# Patient Record
Sex: FEMALE | Race: WHITE | NOT HISPANIC OR LATINO | Employment: FULL TIME | URBAN - METROPOLITAN AREA
[De-identification: names, ages, dates, MRNs, and addresses within clinical notes are randomized per-mention and may not be internally consistent; named-entity substitution may affect disease eponyms.]

---

## 2017-02-13 ENCOUNTER — TELEPHONE (OUTPATIENT)
Dept: OBSTETRICS AND GYNECOLOGY | Facility: CLINIC | Age: 37
End: 2017-02-13

## 2017-02-13 NOTE — TELEPHONE ENCOUNTER
"Requesting appt for regular exam and had an episode of LLQ pains yesterday but none today. Also has had several episodes of vaginal/vulva irritation, thinks it is related to changing soaps. Wants to be checked "ASAP", offered first available appt and pt could not take due to "other plans that day", appt scheduled  "

## 2017-02-13 NOTE — TELEPHONE ENCOUNTER
----- Message from Perez Licea sent at 2/13/2017 11:46 AM CST -----  Contact: Patient  x_ 1st Request  _ 2nd Request  _ 3rd Request    Who: MICAELA FONG [[id number]]    Why: Patient is calling in regards to scheduling a appointment as soon as possible. Patient states she is experiencing some vaginal issues and do not want to discuss it over the phone. Patient is needing a call back from staff.     What Number to Call Back: Patient can reached after 3pm at [phone].    When to Expect a call back: (Before the end of the day)  -- if call after 3:00 call back will be tomorrow.

## 2017-02-13 NOTE — TELEPHONE ENCOUNTER
----- Message from Irene Villalta sent at 2/13/2017  3:14 PM CST -----  Contact: Self  Pt is returning a phone call in order to schedule an appt for some symptoms that she is experiencing. The pt can be reached at [phone]. Thanks KG

## 2017-03-09 ENCOUNTER — OFFICE VISIT (OUTPATIENT)
Dept: OBSTETRICS AND GYNECOLOGY | Facility: CLINIC | Age: 37
End: 2017-03-09
Attending: OBSTETRICS & GYNECOLOGY
Payer: COMMERCIAL

## 2017-03-09 VITALS
BODY MASS INDEX: 20.9 KG/M2 | DIASTOLIC BLOOD PRESSURE: 70 MMHG | SYSTOLIC BLOOD PRESSURE: 114 MMHG | WEIGHT: 110.69 LBS | HEIGHT: 61 IN

## 2017-03-09 DIAGNOSIS — R30.0 DYSURIA: ICD-10-CM

## 2017-03-09 DIAGNOSIS — N94.10 DYSPAREUNIA IN FEMALE: ICD-10-CM

## 2017-03-09 DIAGNOSIS — N90.89 IRRITATION OF VULVA: ICD-10-CM

## 2017-03-09 DIAGNOSIS — R10.2 FEMALE PELVIC PAIN: Primary | ICD-10-CM

## 2017-03-09 DIAGNOSIS — Z87.42 HISTORY OF ABNORMAL CERVICAL PAP SMEAR: ICD-10-CM

## 2017-03-09 PROCEDURE — 87086 URINE CULTURE/COLONY COUNT: CPT

## 2017-03-09 PROCEDURE — 87102 FUNGUS ISOLATION CULTURE: CPT

## 2017-03-09 PROCEDURE — 99213 OFFICE O/P EST LOW 20 MIN: CPT | Mod: S$GLB,,, | Performed by: OBSTETRICS & GYNECOLOGY

## 2017-03-09 PROCEDURE — 99999 PR PBB SHADOW E&M-EST. PATIENT-LVL III: CPT | Mod: PBBFAC,,, | Performed by: OBSTETRICS & GYNECOLOGY

## 2017-03-09 PROCEDURE — 1160F RVW MEDS BY RX/DR IN RCRD: CPT | Mod: S$GLB,,, | Performed by: OBSTETRICS & GYNECOLOGY

## 2017-03-09 PROCEDURE — 87591 N.GONORRHOEAE DNA AMP PROB: CPT

## 2017-03-09 NOTE — PROGRESS NOTES
Subjective:     Patient ID: Juan Ramirez is a 36 y.o. female.     Chief Complaint: Gynecologic Exam (complains of lower abdominal pains on and off x3 weeks, complains of slight vaginal irritation)     History of Present Illness: This patient is a 36 y.o. female, who presents to the GYN clinic for evaluation of pelvic discomfort/ pain,  lower abdominal discomfort, dysuria and frequency of urination.  These symptoms develop several weeks ago.  But has increased in the last few days.  And she is also noted dyspareunia in the last few days.  She is also developed some vulvovaginal irritation and itching.  She denies vaginal discharge,fever, nausea, vomiting and diarrhea. she is not using hormonal contraception.  Her periods are regular, her menstrual flow lasts for 5-6 days and is moderate in amount, with no excessive cramping.       Patient's last menstrual period was 02/26/2017.    Review of Systems    GENERAL: No fever, chills, fatigability or weightchange  SKIN: No rashes, itching or changes in color or texture of skin.  HEAD: No headaches or recent head trauma.  EYES: Visual acuity fine. No photophobia,r diplopia.  EARS: Denies earache or vertigo  NOSE: No loss of smell, no epistaxis or postnasal drip.  MOUTH & THROAT: No hoarseness or change in voice.   NODES: Denies swollen glands.  CHEST: Denies RICARDO, cyanosis, wheezing, cough and sputum production.  CARDIOVASCULAR: Denies chest pain, PND, orthopnea or reduced exercise tolerance.  ABDOMEN: Appetite fine. No weight loss. bloating, Denies diarrhea, abdominal pain, hematemesis or blood in stool.  URINARY: No flank pain, dysuria or hematuria.  PERIPHERAL VASCULAR: No claudication or cyanosis.Varicosities  MUSCULOSKELETAL: No joint stiffness or swelling. Denies back pain.muscle aches  NEUROLOGIC: No history of seizures, paralysis, alteration of gait or coordination.       Objective:       Physical Exam     APPEARANCE: Well nourished, well developed, in no acute  distress.    GENITOURINARY:  Vulva: No lesions. Normal female genital architecture.  Urethral Meatus: Normal size and location, no lesions, no prolapse.  Urethra: No masses, tenderness, prolapse or scarring.  Vagina: Moist with rugae, no discharge, no significant cystocele or rectocele.  Cervix: No lesions, normal diameter, no stenosis, no cervical motion tenderness. .  Uterus: 6 week size, regular shape, mobile, mild tenderness, retroverted position, good support.  Adnexa: No masses, tenderness or CDS nodularity.  There was a non tender, fullness on the right side.  Anus Perineum: No lesions, no relaxation, no external hemorrhoids.  Abdomen: No masses, tenderness, non acute, no hernia or ascites, no hepatosplenomegaly  Neck: Supple. Symmetric without masses. No thyromegaly.  Skin: No rashes, lesions, ulcers, acne, hirsutism.  Peripheral/lower extremities: No edema, erythema or tenderness.  Lymphatic: No axillary, neck or groin nodes palp.  Mental Status: Alert, oriented x 3, normal affect and mood.          @PROCEDURE:@           Assessment:      1. Female pelvic pain    2. Dyspareunia in female    3. Irritation of vulva    4. Dysuria    5. History of abnormal cervical Pap smear               Plan:  1.  DNA cervical screen, Candida culture of the vagina, CBC.  2.  Pelvic ultrasound  3.  Urine culture  4.  FH of breast cancer mother (age 45) and grandmother, refer to breast center for evaluation.                  Orders Placed This Encounter   Procedures    Urine culture    C. trachomatis/N. gonorrhoeae by AMP DNA Cervix    Fungus culture    US Pelvis Complete Non OB

## 2017-03-10 LAB
C TRACH DNA SPEC QL NAA+PROBE: NEGATIVE
N GONORRHOEA DNA SPEC QL NAA+PROBE: NEGATIVE

## 2017-03-11 LAB — BACTERIA UR CULT: NO GROWTH

## 2017-03-14 ENCOUNTER — HOSPITAL ENCOUNTER (OUTPATIENT)
Dept: RADIOLOGY | Facility: HOSPITAL | Age: 37
Discharge: HOME | End: 2017-03-14
Attending: OBSTETRICS & GYNECOLOGY
Payer: COMMERCIAL

## 2017-03-14 DIAGNOSIS — N90.89 IRRITATION OF VULVA: ICD-10-CM

## 2017-03-14 DIAGNOSIS — Z87.42 HISTORY OF ABNORMAL CERVICAL PAP SMEAR: ICD-10-CM

## 2017-03-14 DIAGNOSIS — R10.2 FEMALE PELVIC PAIN: ICD-10-CM

## 2017-03-14 DIAGNOSIS — R30.0 DYSURIA: ICD-10-CM

## 2017-03-14 PROCEDURE — 76856 US EXAM PELVIC COMPLETE: CPT | Mod: TC,PO

## 2017-03-14 PROCEDURE — 76856 US EXAM PELVIC COMPLETE: CPT | Mod: 26,,, | Performed by: RADIOLOGY

## 2017-03-14 PROCEDURE — 76830 TRANSVAGINAL US NON-OB: CPT | Mod: 26,,, | Performed by: RADIOLOGY

## 2017-03-17 ENCOUNTER — TELEPHONE (OUTPATIENT)
Dept: OBSTETRICS AND GYNECOLOGY | Facility: CLINIC | Age: 37
End: 2017-03-17

## 2017-03-17 NOTE — TELEPHONE ENCOUNTER
----- Message from Irene Villalta sent at 3/17/2017 10:45 AM CDT -----  Contact: Self  Pt is calling in regards of discussing her test results if possible. The pt can be reached at  [phone]. Thanks kG

## 2017-03-20 ENCOUNTER — TELEPHONE (OUTPATIENT)
Dept: OBSTETRICS AND GYNECOLOGY | Facility: CLINIC | Age: 37
End: 2017-03-20

## 2017-03-20 NOTE — TELEPHONE ENCOUNTER
----- Message from Karly Warren sent at 3/20/2017  1:49 PM CDT -----  Contact: MICAELA FONG [[id number]]  _  1st Request  _  2nd Request  _  3rd Request        Who: MICAELA FONG [[id number]]    Why: pt is returning a call    What Number to Call Back: [phone]    When to Expect a call back: (Before the end of the day)   -- if call after 3:00 call back will be tomorrow.

## 2017-03-20 NOTE — TELEPHONE ENCOUNTER
Answered questions about test results and meaning of adenomyosis. All questions answered. Pt to call back if continues with progressive/prolonged pain or any changes in symptoms

## 2017-03-20 NOTE — TELEPHONE ENCOUNTER
----- Message from Karly Warren sent at 3/20/2017  1:49 PM CDT -----  Contact: MICAELA FONG [[id number]]  _  1st Request  _  2nd Request  _  3rd Request        Who: MICAELA FONG [[id number]]    Why: pt is returning a call    What Number to Call Back: [phone]    When to Expect a call back: (Before the end of the day)   -- if call after 3:00 call back will be tomorrow.

## 2017-04-11 LAB — FUNGUS SPEC CULT: NORMAL

## 2017-04-26 ENCOUNTER — TELEPHONE (OUTPATIENT)
Dept: FAMILY MEDICINE | Facility: CLINIC | Age: 37
End: 2017-04-26

## 2017-04-26 NOTE — TELEPHONE ENCOUNTER
----- Message from Natalie Sagastume sent at 4/25/2017  4:25 PM CDT -----  Contact:  call  //[phone]    Calling to  Speak to the  Nurse about  Ordering  Test // please call

## 2017-04-26 NOTE — TELEPHONE ENCOUNTER
----- Message from Khloe Roy sent at 4/26/2017 10:34 AM CDT -----  Contact: Patient  Patient returning call. Please call back at [phone]. Thanks,

## 2017-05-01 NOTE — TELEPHONE ENCOUNTER
Called pt, she has an appt Wednesday and wanted to know if NP can order labs. I advised she can and she verbally understood.

## 2017-05-03 ENCOUNTER — LAB VISIT (OUTPATIENT)
Dept: LAB | Facility: HOSPITAL | Age: 37
End: 2017-05-03
Attending: NURSE PRACTITIONER
Payer: COMMERCIAL

## 2017-05-03 ENCOUNTER — OFFICE VISIT (OUTPATIENT)
Dept: FAMILY MEDICINE | Facility: CLINIC | Age: 37
End: 2017-05-03
Payer: COMMERCIAL

## 2017-05-03 VITALS
HEART RATE: 60 BPM | DIASTOLIC BLOOD PRESSURE: 64 MMHG | SYSTOLIC BLOOD PRESSURE: 100 MMHG | BODY MASS INDEX: 20.77 KG/M2 | WEIGHT: 110 LBS | HEIGHT: 61 IN

## 2017-05-03 DIAGNOSIS — R21 RASH OF FACE: ICD-10-CM

## 2017-05-03 DIAGNOSIS — R21 BUTTERFLY RASH: ICD-10-CM

## 2017-05-03 DIAGNOSIS — E06.3 HASHIMOTO'S DISEASE: Primary | ICD-10-CM

## 2017-05-03 DIAGNOSIS — E04.1 THYROID NODULE: ICD-10-CM

## 2017-05-03 DIAGNOSIS — R53.83 FATIGUE, UNSPECIFIED TYPE: ICD-10-CM

## 2017-05-03 DIAGNOSIS — E06.3 HASHIMOTO'S DISEASE: ICD-10-CM

## 2017-05-03 LAB
BILIRUB UR QL STRIP: NEGATIVE
CLARITY UR: ABNORMAL
COLOR UR: YELLOW
GLUCOSE UR QL STRIP: NEGATIVE
HGB UR QL STRIP: NEGATIVE
KETONES UR QL STRIP: NEGATIVE
LEUKOCYTE ESTERASE UR QL STRIP: NEGATIVE
NITRITE UR QL STRIP: NEGATIVE
PH UR STRIP: 7 [PH] (ref 5–8)
PROT UR QL STRIP: NEGATIVE
SP GR UR STRIP: 1.01 (ref 1–1.03)
URN SPEC COLLECT METH UR: ABNORMAL

## 2017-05-03 PROCEDURE — 99999 PR PBB SHADOW E&M-EST. PATIENT-LVL III: CPT | Mod: PBBFAC,,, | Performed by: NURSE PRACTITIONER

## 2017-05-03 PROCEDURE — 81003 URINALYSIS AUTO W/O SCOPE: CPT | Mod: PO

## 2017-05-03 PROCEDURE — 1160F RVW MEDS BY RX/DR IN RCRD: CPT | Mod: S$GLB,,, | Performed by: NURSE PRACTITIONER

## 2017-05-03 PROCEDURE — 99213 OFFICE O/P EST LOW 20 MIN: CPT | Mod: S$GLB,,, | Performed by: NURSE PRACTITIONER

## 2017-05-03 NOTE — MR AVS SNAPSHOT
[facility]  [address] [facility] [address]  [place] [address]  Phone: [phone]  Fax: [phone]                  Juan Ramirez   5/3/2017 1:20 PM   Office Visit    Description:  Female : 1980   Provider:  Violeta James NP   Department:  [facility]           Reason for Visit     Insomnia     Fatigue     Rash           Diagnoses this Visit        Comments    Hashimoto's disease    -  Primary     Thyroid nodule         Fatigue, unspecified type         Rash of face         Butterfly rash                To Do List           Future Appointments        Provider Department Dept Phone    5/3/2017 1:55 PM URINE [facility] [phone]    5/3/2017 2:10 PM URINE [facility] [phone]    2017 2:00 PM Radha Salazar MD [place] - Dermatology [phone]    2017 2:00 PM Niko Mcgraw MD [facility] Rheumatology [phone]      Goals (5 Years of Data)     None      [facility]     [facility] Nurse Care Line -  Assistance  Unless otherwise directed by your provider, please contact [facility], our nurse care line that is available for  assistance.     Registered nurses in the [facility] provide: appointment scheduling, clinical advisement, health education, and other advisory services.  Call: [phone] (toll free)               Medications           Message regarding Medications     Verify the changes and/or additions to your medication regime listed below are the same as discussed with your clinician today.  If any of these changes or additions are incorrect, please notify your healthcare provider.             Verify that the below list of medications is an accurate representation of the medications you are currently taking.  If none reported, the list may be blank. If incorrect, please contact your healthcare provider. Carry this list with you in case of emergency.           Current  "Medications     Lactobacillus rhamnosus GG (CULTURELLE) 10 billion cell capsule Take 1 capsule by mouth once daily.    SYNTHROID 75 mcg tablet Take 1 tablet (75 mcg total) by mouth once daily. Add 1/2 pill on sundays           Clinical Reference Information           Your Vitals Were     BP Pulse Height Weight Last Period BMI    100/64 60 5' 1" (1.549 m) 49.9 kg (110 lb 0.2 oz) 04/19/2017 20.79 kg/m2      Blood Pressure          Most Recent Value    BP  100/64      Allergies as of 5/3/2017     No Known Allergies      Immunizations Administered on Date of Encounter - 5/3/2017     None      Orders Placed During Today's Visit      Normal Orders This Visit    Ambulatory referral to Rheumatology     Future Labs/Procedures Expected by Expires    DOUGLAS  5/3/2017 7/2/2018    C-reactive protein  5/3/2017 8/1/2017    CBC auto differential  5/3/2017 8/1/2017    Comprehensive metabolic panel  5/3/2017 8/1/2017    Cortisol  5/3/2017 7/2/2018    LM-BARR VIRUS ANTIBODY PANEL  5/3/2017 7/2/2018    Sedimentation rate, manual  5/3/2017 8/1/2017    THYROID PEROXIDASE ANTIBODY  5/3/2017 7/2/2018    TSH  5/3/2017 8/1/2017    Urinalysis  5/3/2017 7/2/2018    Vitamin B12  5/3/2017 8/1/2017    Vitamin D  5/3/2017 7/2/2018      Language Assistance Services     ATTENTION: Language assistance services are available, free of charge. Please call [phone].      ATENCIÓN: Si habla polo, tiene a patel disposición servicios gratuitos de asistencia lingüística. Llame al [phone].     [facility] Ý: N?u b?n nói Ti?ng Vi?t, có các d?ch v? h? tr? ngôn ng? mi?n phí dành cho b?n. G?i s? [phone].         [facility] complies with applicable Federal civil rights laws and does not discriminate on the basis of race, color, national origin, age, disability, or sex.        "

## 2017-05-08 NOTE — PROGRESS NOTES
Subjective:       Patient ID: Juan Ramirez is a 36 y.o. female.    Chief Complaint: Insomnia; Fatigue; and Rash (on face. would like to be tested for lupus)    Fatigue   This is a recurrent problem. The current episode started more than 1 month ago. The problem occurs constantly. Associated symptoms include arthralgias, fatigue and a rash (facial). Pertinent negatives include no abdominal pain, anorexia, change in bowel habit, chest pain, chills, congestion, coughing, diaphoresis, fever, headaches, joint swelling, nausea, neck pain, numbness, sore throat, urinary symptoms, vertigo, visual change, vomiting or weakness.   Rash   This is a new problem. The current episode started more than 1 month ago. The affected locations include the face. It is unknown if there was an exposure to a precipitant. Associated symptoms include fatigue. Pertinent negatives include no anorexia, congestion, cough, fever, sore throat or vomiting.     Review of Systems   Constitutional: Positive for fatigue. Negative for chills, diaphoresis and fever.   HENT: Negative for congestion and sore throat.    Respiratory: Negative for cough.    Cardiovascular: Negative for chest pain.   Gastrointestinal: Negative for abdominal pain, anorexia, change in bowel habit, nausea and vomiting.   Musculoskeletal: Positive for arthralgias. Negative for joint swelling and neck pain.   Skin: Positive for rash (facial).   Neurological: Negative for vertigo, weakness, numbness and headaches.       Objective:      Physical Exam   Constitutional: She is oriented to person, place, and time. She appears well-nourished.   HENT:   Head: Normocephalic and atraumatic.   Right Ear: External ear normal.   Left Ear: External ear normal.   Nose: Nose normal.   Mouth/Throat: Oropharynx is clear and moist. No oropharyngeal exudate.   Eyes: Conjunctivae and EOM are normal. Pupils are equal, round, and reactive to light.   Neck: Normal range of motion. Neck supple.    Cardiovascular: Normal rate, regular rhythm, normal heart sounds and intact distal pulses.    Pulmonary/Chest: Effort normal and breath sounds normal. She has no wheezes. She has no rales.   Abdominal: Soft. Bowel sounds are normal. There is no tenderness.   Lymphadenopathy:     She has no cervical adenopathy.   Neurological: She is alert and oriented to person, place, and time.   Skin: Skin is warm and dry. Rash (facial ) noted.   Vitals reviewed.      Assessment:       1. Hashimoto's disease    2. Thyroid nodule    3. Fatigue, unspecified type    4. Rash of face    5. Butterfly rash        Plan:       Juan was seen today for insomnia, fatigue and rash.    Diagnoses and all orders for this visit:    Hashimoto's disease  -     DOUGLAS; Future  -     Sedimentation rate, manual; Future  -     Comprehensive metabolic panel; Future  -     Urinalysis; Future  -     CBC auto differential; Future  -     TSH; Future  -     Vitamin D; Future  -     Vitamin B12; Future  -     LM-BARR VIRUS ANTIBODY PANEL; Future  -     Cortisol; Future  -     C-reactive protein; Future  -     THYROID PEROXIDASE ANTIBODY; Future  -     Ambulatory referral to Rheumatology    Thyroid nodule  -     DOUGLAS; Future  -     Sedimentation rate, manual; Future  -     Comprehensive metabolic panel; Future  -     Urinalysis; Future  -     CBC auto differential; Future  -     TSH; Future  -     Vitamin D; Future  -     Vitamin B12; Future  -     LM-BARR VIRUS ANTIBODY PANEL; Future  -     Cortisol; Future  -     C-reactive protein; Future  -     THYROID PEROXIDASE ANTIBODY; Future  -     Ambulatory referral to Rheumatology    Fatigue, unspecified type  -     DOUGLAS; Future  -     Sedimentation rate, manual; Future  -     Comprehensive metabolic panel; Future  -     Urinalysis; Future  -     CBC auto differential; Future  -     TSH; Future  -     Vitamin D; Future  -     Vitamin B12; Future  -     LM-BARR VIRUS ANTIBODY PANEL; Future  -      Cortisol; Future  -     C-reactive protein; Future  -     THYROID PEROXIDASE ANTIBODY; Future  -     Ambulatory referral to Rheumatology    Rash of face  -     DOUGLAS; Future  -     Sedimentation rate, manual; Future  -     Comprehensive metabolic panel; Future  -     Urinalysis; Future  -     CBC auto differential; Future  -     TSH; Future  -     Vitamin D; Future  -     Vitamin B12; Future  -     LM-BARR VIRUS ANTIBODY PANEL; Future  -     Cortisol; Future  -     C-reactive protein; Future  -     THYROID PEROXIDASE ANTIBODY; Future  -     Ambulatory referral to Rheumatology    Butterfly rash  -     Ambulatory referral to Rheumatology

## 2017-05-16 ENCOUNTER — TELEPHONE (OUTPATIENT)
Dept: OBSTETRICS AND GYNECOLOGY | Facility: CLINIC | Age: 37
End: 2017-05-16

## 2017-05-16 DIAGNOSIS — Z87.42 PERSONAL HISTORY OF OVARIAN CYST: ICD-10-CM

## 2017-05-16 DIAGNOSIS — R10.2 PELVIC PAIN IN FEMALE: Primary | ICD-10-CM

## 2017-05-16 NOTE — TELEPHONE ENCOUNTER
"Pt concerned about possible "adenomyosis" noted in ultrasound report. States cycles are coming slightly earlier by a few days over the last 2-3 months and this past cycle had signifcant episode of cramps/pelvic pain, "felt like labor pains", only last for a few hours.  has had pelvic pain in past, last appt was for pain and had "cysts" Pt  has been researching adenomyosis and concerned about possible "cancer" and requests MRI to see if she has this. Will notify MD  "

## 2017-05-16 NOTE — TELEPHONE ENCOUNTER
"Pt notified as per Dr Elizabeth she can use the ultrasound results as suggestive of adenomyosis, definitve diagnosis is with pathology report after hysterectomy. Discussed MRI not indicated and would not give definitive diagnosis, may not be covered by insurance and could by expensive out of pocket costs. Discussed treatment for adenomyosis is hysterectomy, pt states she is not interested in hysterectomy at this time. Pt is requesting MRI, states "this is making me crazy not knowing if I have this, I need to know", tried to explain MRI will not be diagnostic. Pt insisting on wanting MRI, will make  aware  "

## 2017-05-16 NOTE — TELEPHONE ENCOUNTER
----- Message from Mandie De sent at 5/16/2017  1:40 PM CDT -----  Contact: self  Patient is requesting a call back to discuss getting testing done, patient is unsure of what the testing is called but stated it was for endometriosis, patient can be reached at [phone].

## 2017-06-02 ENCOUNTER — HOSPITAL ENCOUNTER (OUTPATIENT)
Dept: RADIOLOGY | Facility: HOSPITAL | Age: 37
Discharge: HOME | End: 2017-06-02
Attending: OBSTETRICS & GYNECOLOGY
Payer: COMMERCIAL

## 2017-06-02 DIAGNOSIS — R10.2 PELVIC PAIN IN FEMALE: ICD-10-CM

## 2017-06-02 DIAGNOSIS — Z87.42 PERSONAL HISTORY OF OVARIAN CYST: ICD-10-CM

## 2017-06-02 PROCEDURE — 72195 MRI PELVIS W/O DYE: CPT | Mod: 26,,, | Performed by: RADIOLOGY

## 2017-06-02 PROCEDURE — 72195 MRI PELVIS W/O DYE: CPT | Mod: TC,PO

## 2017-06-09 ENCOUNTER — TELEPHONE (OUTPATIENT)
Dept: OBSTETRICS AND GYNECOLOGY | Facility: CLINIC | Age: 37
End: 2017-06-09

## 2017-06-09 NOTE — TELEPHONE ENCOUNTER
Pt requesting results of MRI, explained  has been out of town, in office today and will review and send message vial MO system.

## 2017-06-09 NOTE — TELEPHONE ENCOUNTER
----- Message from Venkata Diaz sent at 6/9/2017 10:12 AM CDT -----  PLEASE CALL PT NO DETAILS  [phone]

## 2017-06-12 ENCOUNTER — TELEPHONE (OUTPATIENT)
Dept: OBSTETRICS AND GYNECOLOGY | Facility: CLINIC | Age: 37
End: 2017-06-12

## 2017-06-12 NOTE — TELEPHONE ENCOUNTER
----- Message from Karly Warren sent at 6/12/2017 11:32 AM CDT -----  Contact: MICAELA FONG [[id number]]  _x  1st Request  _  2nd Request  _  3rd Request        Who: MICAELA FONG [[id number]]    Why: patient states she would like a call back in regards to her MRI test results.     What Number to Call Back: [phone]    When to Expect a call back: (Before the end of the day)   -- if call after 3:00 call back will be tomorrow.

## 2017-06-13 NOTE — TELEPHONE ENCOUNTER
Dr Elizabeth spoke with pt regarding MRI report and history of pelvic pain. Discussed possible need for dx laparoscopy for evaluation/referral to Dr Vanessa. Pt not sure if she is ready to pursue this option at this time. Will notify us if she decides on this

## 2017-06-13 NOTE — TELEPHONE ENCOUNTER
"Pt concerned that MRI "appears normal" but she is having cramps and pelvic pains most days and periods are coming "sooner" each month. States period used to come on the "27th-29th" of the month and now period comes around the "19th" and this month it did come on the "14th". Pt has concerns about possible ovarian cancer and how she would tell if she has this. Will notify MD of pt's concerns and have MD contact pt.  Pt request return call at [phone]  "

## 2017-06-21 ENCOUNTER — OFFICE VISIT (OUTPATIENT)
Dept: DERMATOLOGY | Facility: CLINIC | Age: 37
End: 2017-06-21
Payer: COMMERCIAL

## 2017-06-21 DIAGNOSIS — L82.1 SK (SEBORRHEIC KERATOSIS): ICD-10-CM

## 2017-06-21 DIAGNOSIS — L71.9 ROSACEA: Primary | ICD-10-CM

## 2017-06-21 PROCEDURE — 99999 PR PBB SHADOW E&M-EST. PATIENT-LVL II: CPT | Mod: PBBFAC,,, | Performed by: DERMATOLOGY

## 2017-06-21 PROCEDURE — 99202 OFFICE O/P NEW SF 15 MIN: CPT | Mod: S$GLB,,, | Performed by: DERMATOLOGY

## 2017-06-21 RX ORDER — DOXYCYCLINE HYCLATE 150 MG/1
150 TABLET, DELAYED RELEASE ORAL DAILY
Qty: 30 TABLET | Refills: 1 | Status: SHIPPED | OUTPATIENT
Start: 2017-06-21 | End: 2017-08-21 | Stop reason: SDDI

## 2017-06-21 RX ORDER — SODIUM SULFACETAMIDE AND SULFUR 80; 40 MG/ML; MG/ML
SOLUTION TOPICAL
Qty: 473 ML | Refills: 3 | Status: SHIPPED | OUTPATIENT
Start: 2017-06-21 | End: 2018-10-19

## 2017-06-21 RX ORDER — METRONIDAZOLE 7.5 MG/G
GEL TOPICAL
Qty: 1 TUBE | Refills: 6 | Status: SHIPPED | OUTPATIENT
Start: 2017-06-21 | End: 2017-08-21 | Stop reason: SDDI

## 2017-06-21 NOTE — LETTER
June 21, 2017      Arun Pascual MD  [address] [facility] [address] [place] [address]           [place] - Dermatology  [address] [facility] [address]  Phone: [phone]  Fax: [phone]          Patient: Juan Ramirez   MR Number: [id number]   YOB: 1980   Date of Visit: 6/21/2017       Dear Dr. Arun Pascual:    Thank you for referring Juan Ramirez to me for evaluation. Attached you will find relevant portions of my assessment and plan of care.    If you have questions, please do not hesitate to call me. I look forward to following Juan Ramirez along with you.    Sincerely,    Radha Salazar MD    Enclosure  CC:  No Recipients    If you would like to receive this communication electronically, please contact [email or web address] or [phone] to request more information on [employer] access.    For providers and/or their staff who would like to refer a patient to [facility], please contact us through our one-stop-shop provider referral line, [facility], at [phone].    If you feel you have received this communication in error or would no longer like to receive these types of communications, please e-mail [email or web address]

## 2017-06-21 NOTE — PROGRESS NOTES
Subjective:       Patient ID:  Juan Ramirez is a 36 y.o. female who presents for   Chief Complaint   Patient presents with    Rash     Pt c/o small red bumps on face x a few months. Started in between brows and then spread to cheekis. Was concerned it was lupus and had labs which are normal per patient. Rash gets scaly around her nose.  Pt uses otc della facial mask. Pt has also been gluten free for over a year and has reintroduced gluten and she feels improved. Uses cerave cleanser and cerave pm lotion .    C/o lesion on let breast. Present whole life. Feels shape and color has changed.          Review of Systems   Gastrointestinal: Positive for indigestion.   Skin: Positive for itching and rash. Negative for daily sunscreen use.        Objective:    Physical Exam   Constitutional: She appears well-developed and well-nourished. No distress.   Neurological: She is alert and oriented to person, place, and time. She is not disoriented.   Psychiatric: She has a normal mood and affect.   Skin:   Areas Examined (abnormalities noted in diagram):   Scalp / Hair Palpated and Inspected  Head / Face Inspection Performed  Neck Inspection Performed  Chest / Axilla Inspection Performed  Abdomen Inspection Performed  Back Inspection Performed  RUE Inspected  LUE Inspection Performed                   Diagram Legend     Erythematous scaling macule/papule c/w actinic keratosis       Vascular papule c/w angioma      Pigmented verrucoid papule/plaque c/w seborrheic keratosis      Yellow umbilicated papule c/w sebaceous hyperplasia      Irregularly shaped tan macule c/w lentigo     1-2 mm smooth white papules consistent with Milia      Movable subcutaneous cyst with punctum c/w epidermal inclusion cyst      Subcutaneous movable cyst c/w pilar cyst      Firm pink to brown papule c/w dermatofibroma      Pedunculated fleshy papule(s) c/w skin tag(s)      Evenly pigmented macule c/w junctional nevus     Mildly variegated pigmented,  slightly irregular-bordered macule c/w mildly atypical nevus      Flesh colored to evenly pigmented papule c/w intradermal nevus       Pink pearly papule/plaque c/w basal cell carcinoma      Erythematous hyperkeratotic cursted plaque c/w SCC      Surgical scar with no sign of skin cancer recurrence      Open and closed comedones      Inflammatory papules and pustules      Verrucoid papule consistent consistent with wart     Erythematous eczematous patches and plaques     Dystrophic onycholytic nail with subungual debris c/w onychomycosis     Umbilicated papule    Erythematous-base heme-crusted tan verrucoid plaque consistent with inflamed seborrheic keratosis     Erythematous Silvery Scaling Plaque c/w Psoriasis     See annotation      Assessment / Plan:        Rosacea  -     metronidazole (METROGEL) 0.75 % gel; AAA face qhs  Dispense: 1 Tube; Refill: 6  -     sulfacetamide sodium-sulfur (SULFACLEANSE 8-4) 8-4 % Susp; Wash face qhs  Dispense: 473 mL; Refill: 3  -     doxycycline (DORYX) 150 MG EC tablet; Take 1 tablet (150 mg total) by mouth once daily. Take with food and not within 1 hour of bedtime  Dispense: 30 tablet; Refill: 1  Discussed benefits and risks of doxycyline therapy including but not limited to GI discomfort, esophageal irritation/ulceration, and increased sun sensitivity. Patient was counseled to take medicine with meals and at least 1 hour before lying down.   cerave am and pm and cleanser    SK (seborrheic keratosis)  These are benign inherited growths without a malignant potential. Reassurance given to patient. No treatment is necessary.              Return in about 2 months (around 8/21/2017).

## 2017-08-15 ENCOUNTER — TELEPHONE (OUTPATIENT)
Dept: DERMATOLOGY | Facility: CLINIC | Age: 37
End: 2017-08-15

## 2017-08-15 NOTE — TELEPHONE ENCOUNTER
----- Message from Precious Ramirez sent at 8/15/2017 10:34 AM CDT -----  Contact: pt   IVELISSE-pt- pt is calling to speak with the nurse to reschedule her 8 week follow up appt for tomorrow to another day in September can you please call pt at [phone]    BRO

## 2017-08-21 ENCOUNTER — OFFICE VISIT (OUTPATIENT)
Dept: DERMATOLOGY | Facility: CLINIC | Age: 37
End: 2017-08-21
Payer: COMMERCIAL

## 2017-08-21 DIAGNOSIS — L71.9 ROSACEA: Primary | ICD-10-CM

## 2017-08-21 DIAGNOSIS — L70.0 ACNE VULGARIS: ICD-10-CM

## 2017-08-21 PROCEDURE — 3008F BODY MASS INDEX DOCD: CPT | Mod: S$GLB,,, | Performed by: DERMATOLOGY

## 2017-08-21 PROCEDURE — 99999 PR PBB SHADOW E&M-EST. PATIENT-LVL II: CPT | Mod: PBBFAC,,, | Performed by: DERMATOLOGY

## 2017-08-21 PROCEDURE — 99213 OFFICE O/P EST LOW 20 MIN: CPT | Mod: S$GLB,,, | Performed by: DERMATOLOGY

## 2017-08-21 NOTE — PROGRESS NOTES
Subjective:       Patient ID:  Juan Ramirez is a 36 y.o. female who presents for   Chief Complaint   Patient presents with    Rosacea     Pt presents for rosacea follow up.  Better, almost clear.  tx with sulfacleanse only.  Decided not to take doxy due to it being an antibiotic.  Not using metrogel either because she thought that was an antibiotic also. Still has background redness.  Finds several foods flare her face        Review of Systems   Skin: Positive for rash. Negative for itching and daily sunscreen use.   Hematologic/Lymphatic: Does not bruise/bleed easily.        Objective:    Physical Exam   Constitutional: She appears well-developed and well-nourished. No distress.   Neurological: She is alert and oriented to person, place, and time. She is not disoriented.   Psychiatric: She has a normal mood and affect.   Skin:   Areas Examined (abnormalities noted in diagram):   Head / Face Inspection Performed              Diagram Legend     Erythematous scaling macule/papule c/w actinic keratosis       Vascular papule c/w angioma      Pigmented verrucoid papule/plaque c/w seborrheic keratosis      Yellow umbilicated papule c/w sebaceous hyperplasia      Irregularly shaped tan macule c/w lentigo     1-2 mm smooth white papules consistent with Milia      Movable subcutaneous cyst with punctum c/w epidermal inclusion cyst      Subcutaneous movable cyst c/w pilar cyst      Firm pink to brown papule c/w dermatofibroma      Pedunculated fleshy papule(s) c/w skin tag(s)      Evenly pigmented macule c/w junctional nevus     Mildly variegated pigmented, slightly irregular-bordered macule c/w mildly atypical nevus      Flesh colored to evenly pigmented papule c/w intradermal nevus       Pink pearly papule/plaque c/w basal cell carcinoma      Erythematous hyperkeratotic cursted plaque c/w SCC      Surgical scar with no sign of skin cancer recurrence      Open and closed comedones      Inflammatory papules and pustules       Verrucoid papule consistent consistent with wart     Erythematous eczematous patches and plaques     Dystrophic onycholytic nail with subungual debris c/w onychomycosis     Umbilicated papule    Erythematous-base heme-crusted tan verrucoid plaque consistent with inflamed seborrheic keratosis     Erythematous Silvery Scaling Plaque c/w Psoriasis     See annotation      Assessment / Plan:        Rosacea/ Acne vulgaris  Pt does not want to use systemic tx  Continue sulfacleanse  Start metrogel qam   cerave am and pm   Sunscreen qam   -     azelaic acid (FINACEA) 15 % Foam; Apply thin film to face qhs  Dispense: 50 g; Refill: 6             Return if symptoms worsen or fail to improve.

## 2017-09-07 ENCOUNTER — TELEPHONE (OUTPATIENT)
Dept: FAMILY MEDICINE | Facility: CLINIC | Age: 37
End: 2017-09-07

## 2017-09-07 NOTE — TELEPHONE ENCOUNTER
Called pt, she was seen at [facility] er f./u. Scheduled tomorrow with PA. She verbally understood. Records requested.

## 2017-09-07 NOTE — TELEPHONE ENCOUNTER
----- Message from Kassi Dias sent at 9/7/2017 12:17 PM CDT -----  Contact: Patient  States that she had just left the ER at [place] and needs to be seen right away.  She went in for dizzyness, nauseated and had a fainting spell.  Can you please call the patient back with an ER Follow up appointment,  The number to call her back on is [phone].  Thank you

## 2017-09-08 ENCOUNTER — OFFICE VISIT (OUTPATIENT)
Dept: FAMILY MEDICINE | Facility: CLINIC | Age: 37
End: 2017-09-08
Payer: COMMERCIAL

## 2017-09-08 VITALS
DIASTOLIC BLOOD PRESSURE: 70 MMHG | RESPIRATION RATE: 16 BRPM | BODY MASS INDEX: 21.94 KG/M2 | WEIGHT: 116.19 LBS | HEIGHT: 61 IN | HEART RATE: 73 BPM | OXYGEN SATURATION: 98 % | SYSTOLIC BLOOD PRESSURE: 106 MMHG

## 2017-09-08 DIAGNOSIS — R55 PRE-SYNCOPE: ICD-10-CM

## 2017-09-08 DIAGNOSIS — E03.9 HYPOTHYROIDISM, UNSPECIFIED TYPE: Primary | ICD-10-CM

## 2017-09-08 PROCEDURE — 99214 OFFICE O/P EST MOD 30 MIN: CPT | Mod: S$GLB,,, | Performed by: PHYSICIAN ASSISTANT

## 2017-09-08 PROCEDURE — 3008F BODY MASS INDEX DOCD: CPT | Mod: S$GLB,,, | Performed by: PHYSICIAN ASSISTANT

## 2017-09-08 PROCEDURE — 99999 PR PBB SHADOW E&M-EST. PATIENT-LVL III: CPT | Mod: PBBFAC,,, | Performed by: PHYSICIAN ASSISTANT

## 2017-09-08 RX ORDER — LEVOTHYROXINE SODIUM 50 UG/1
50 TABLET ORAL DAILY
Qty: 30 TABLET | Refills: 11 | Status: SHIPPED | OUTPATIENT
Start: 2017-09-08 | End: 2017-10-13

## 2017-09-08 NOTE — PROGRESS NOTES
Subjective:       Patient ID: Juan Ramirez is a 36 y.o. female    Chief Complaint: ER follow up ([facility]; lightheaded, nausea, vomiting) and Headache    HPI  Mrs Ramirez is new to me, PCP is Dr Pascual.  Yesterday morning she had a pre-syncopal episode, she reports sweats, nausea, vomiting and shortness of breath after driving to work. She went to [facility].  Records have been reviewed and are scanned in.  She reports that she went through her usual routine.  She denies any pain, no fever, no cough, no congestion.  She has a history of hypothyroid and takes synthroid.  Her TSH yesterday was 1.8.  She would like to  her synthroid because she feels like her dose is too high.  She feels like her heart is racing occasionally.      Review of Systems   Constitutional: Negative for chills and fever.   HENT: Negative for congestion and sore throat.    Respiratory: Negative for cough and shortness of breath.    Cardiovascular: Negative for chest pain and palpitations.   Gastrointestinal: Negative for abdominal pain, constipation, diarrhea, nausea and vomiting.   Genitourinary: Negative for dysuria, frequency and vaginal discharge.   Musculoskeletal: Negative for back pain.   Skin: Negative for rash.   Neurological: Negative for dizziness.         Objective:   Physical Exam   Constitutional: She is oriented to person, place, and time. She appears well-developed and well-nourished. No distress.   HENT:   Head: Normocephalic and atraumatic.   Eyes: EOM are normal. Pupils are equal, round, and reactive to light.   Neck: Normal range of motion. Neck supple.   Cardiovascular: Normal rate, regular rhythm, normal heart sounds and intact distal pulses.  Exam reveals no gallop and no friction rub.    No murmur heard.  Pulmonary/Chest: Effort normal and breath sounds normal. No respiratory distress. She has no wheezes. She has no rales. She exhibits no tenderness.   Abdominal: Soft. Bowel sounds are normal. She  exhibits no distension and no mass. There is no tenderness. There is no guarding.   Musculoskeletal: Normal range of motion.   Neurological: She is alert and oriented to person, place, and time.   Skin: Skin is warm and dry. No rash noted. She is not diaphoretic.   Psychiatric: She has a normal mood and affect. Her behavior is normal. Judgment and thought content normal.         Assessment:       1. Hypothyroidism, unspecified type  levothyroxine (SYNTHROID) 50 MCG tablet         Plan:       Hypothyroidism, unspecified type  -     DECREASE levothyroxine (SYNTHROID) 50 MCG tablet; Take 1 tablet (50 mcg total) by mouth once daily. Add 1/2 pill on sundays  Dispense: 30 tablet; Refill: 11  - follow up with your endocrinologist in 1 months for recheck     Pre-syncope  - Go to the ER if symptoms occur again

## 2017-09-13 ENCOUNTER — TELEPHONE (OUTPATIENT)
Dept: ENDOCRINOLOGY | Facility: CLINIC | Age: 37
End: 2017-09-13

## 2017-09-13 NOTE — TELEPHONE ENCOUNTER
----- Message from Daniela Sheth sent at 9/13/2017 11:15 AM CDT -----  Contact: Juan tel:   [phone]   Pt.says she needs a f/u w/ you asap.   Was recently at an  ER on last Friday.   Having  Thyroid problems.     Pls call w/ an appt. To come in asap.

## 2017-09-13 NOTE — TELEPHONE ENCOUNTER
Left vm message that I have no openings anytime soon with Dr Bui but do have one tomorrow at 2:00 with her Nurse Practitioner whom she works very closely with. Asked her to call back to confirm she can make this appointment. Provided call back number

## 2017-09-14 ENCOUNTER — OFFICE VISIT (OUTPATIENT)
Dept: ENDOCRINOLOGY | Facility: CLINIC | Age: 37
End: 2017-09-14
Payer: COMMERCIAL

## 2017-09-14 VITALS
SYSTOLIC BLOOD PRESSURE: 98 MMHG | WEIGHT: 115.31 LBS | HEART RATE: 62 BPM | HEIGHT: 61 IN | DIASTOLIC BLOOD PRESSURE: 70 MMHG | BODY MASS INDEX: 21.77 KG/M2 | RESPIRATION RATE: 18 BRPM

## 2017-09-14 DIAGNOSIS — E06.3 HYPOTHYROIDISM DUE TO HASHIMOTO'S THYROIDITIS: Primary | ICD-10-CM

## 2017-09-14 DIAGNOSIS — E03.8 HYPOTHYROIDISM DUE TO HASHIMOTO'S THYROIDITIS: Primary | ICD-10-CM

## 2017-09-14 PROCEDURE — 3008F BODY MASS INDEX DOCD: CPT | Mod: S$GLB,,, | Performed by: INTERNAL MEDICINE

## 2017-09-14 PROCEDURE — 99999 PR PBB SHADOW E&M-EST. PATIENT-LVL III: CPT | Mod: PBBFAC,,, | Performed by: INTERNAL MEDICINE

## 2017-09-14 PROCEDURE — 99214 OFFICE O/P EST MOD 30 MIN: CPT | Mod: S$GLB,,, | Performed by: INTERNAL MEDICINE

## 2017-09-14 NOTE — PROGRESS NOTES
Subjective:      Patient ID: Juan Ramirez is a 36 y.o. female.    Chief Complaint:  No chief complaint on file.      History of Present Illness  Ms. Ramirez returns to clinic today for Hypothyroidism follow up     She is a prior patient of Dr. Bui with last documented office visit in 05/2016  This is the patient's initial visit with me today     She has a known h/o hypothyroidism diagnosed 3/6/2012    The patient states on Thursday 9/7/2017 - she took Synthroid on an empty stomach with water. Once she arrived at work around 7:30 am, she experienced lower extremity weakness, palpitations, nausea, headaches and blurred vision     She followed up with her PCP on Friday 9/8/2017 and was advised to decrease Synthroid to 50 mcg daily. The patient did not bring outside labs to review     Today, she states she is starting to feel better with the decrease in dosage    She also has complaints of difficulty sleeping and lack of concentration    The patient states she is taking LT4 separate from other food and medications     She states she has concerns regarding LT4 and would like to try [facility]     She is quite convinced that her thyroid disease and elevated TPO antibody are impacting her overall health and quality of life. She is requesting repeat labs and antibody testing today     She is having regular periods   LMP: 9/11/2017     Review of Systems   Constitutional: Positive for fatigue. Negative for unexpected weight change.   HENT: Negative for trouble swallowing and voice change.    Eyes: Positive for visual disturbance.   Respiratory: Negative for shortness of breath.    Cardiovascular: Positive for palpitations (occasional ). Negative for chest pain.   Gastrointestinal: Negative for abdominal pain, blood in stool, constipation, nausea and vomiting.   Endocrine: Negative for cold intolerance and heat intolerance.   Genitourinary: Negative for menstrual problem.   Musculoskeletal: Positive for myalgias.  Negative for arthralgias and neck pain.   Skin: Negative for wound.   Neurological: Positive for tremors and headaches.   Hematological: Does not bruise/bleed easily.   Psychiatric/Behavioral: Positive for decreased concentration and sleep disturbance.       Objective:   Physical Exam   Constitutional: She is oriented to person, place, and time. She appears well-developed. No distress.   Neck: No thyromegaly present.   Cardiovascular: Normal rate.    Pulmonary/Chest: Effort normal.   Abdominal: Soft.   Musculoskeletal: She exhibits no edema.   Lymphadenopathy:     She has no cervical adenopathy.   Neurological: She is alert and oriented to person, place, and time.   Skin: Skin is warm.   Psychiatric: She has a normal mood and affect.   Nursing note and vitals reviewed.    Lab Review:   Lab Results   Component Value Date    TSH 1.252 05/03/2017    TSH 2.118 12/07/2016    TSH 2.166 09/28/2016     Results for MICAELA FONG (MRN [id number]) as of 9/14/2017 15:18   Ref. Range 9/28/2016 15:38 12/7/2016 16:06 5/3/2017 14:13   Thyroperoxidase Antibodies Latest Ref Range: <6.0 IU/mL 119.4 (H) 99.7 (H) 97.5 (H)     Results for MICAELA FONG (MRN [id number]) as of 9/14/2017 15:18   Ref. Range 5/3/2017 14:13   Vitamin B-12 Latest Ref Range: 210 - 950 pg/mL 380   Vit D, 25-Hydroxy Latest Ref Range: 30 - 96 ng/mL 53     US 2014  Heterogeneous thyroid consistent with autoimmune thyroiditis.  No nodules identified.    RECOMMENDATIONS:  No need to repeat neck ultrasound unless clinical changes.  Assessment:     1. Hypothyroidism due to Hashimoto's thyroiditis  Thyroid peroxidase antibody    T4, free    TSH    Vitamin D    TSH     Plan:     1. Hypothyroidism due to Hashimoto's thyroiditis   -- discussed significance   -- discussed that it is likely not the cause for her symptoms   -- discussed alternative treatment modalities - Pascual Goode thyroid   -- we do not recommend the use of desiccated thyroid as replacement therapy for  hyperthyroidism   -- pt will consider Tirosint or natural supplement   -- advised to notify office of her decision   -- labs today   -- for now, continue Synthroid 50 mcg daily   -- we discussed the use of selenium supplements to help decrease TPO AB       The patient is instructed to notify our office with any additional concerns or questions

## 2017-09-18 ENCOUNTER — OFFICE VISIT (OUTPATIENT)
Dept: RHEUMATOLOGY | Facility: CLINIC | Age: 37
End: 2017-09-18
Payer: COMMERCIAL

## 2017-09-18 ENCOUNTER — HOSPITAL ENCOUNTER (OUTPATIENT)
Dept: RADIOLOGY | Facility: HOSPITAL | Age: 37
Discharge: HOME | End: 2017-09-18
Attending: INTERNAL MEDICINE
Payer: COMMERCIAL

## 2017-09-18 VITALS
HEART RATE: 64 BPM | SYSTOLIC BLOOD PRESSURE: 100 MMHG | WEIGHT: 114.44 LBS | BODY MASS INDEX: 21.62 KG/M2 | DIASTOLIC BLOOD PRESSURE: 72 MMHG

## 2017-09-18 DIAGNOSIS — R21 MALAR RASH: ICD-10-CM

## 2017-09-18 DIAGNOSIS — M54.50 LOW BACK PAIN WITHOUT SCIATICA, UNSPECIFIED BACK PAIN LATERALITY, UNSPECIFIED CHRONICITY: ICD-10-CM

## 2017-09-18 DIAGNOSIS — E06.3 AUTOIMMUNE THYROIDITIS: ICD-10-CM

## 2017-09-18 DIAGNOSIS — H53.9 VISUAL CHANGES: ICD-10-CM

## 2017-09-18 DIAGNOSIS — R51.9 HEADACHE, UNSPECIFIED HEADACHE TYPE: ICD-10-CM

## 2017-09-18 DIAGNOSIS — M53.3 SACRAL BACK PAIN: ICD-10-CM

## 2017-09-18 DIAGNOSIS — R51.9 HEADACHE, UNSPECIFIED HEADACHE TYPE: Primary | ICD-10-CM

## 2017-09-18 PROCEDURE — 3008F BODY MASS INDEX DOCD: CPT | Mod: S$GLB,,, | Performed by: INTERNAL MEDICINE

## 2017-09-18 PROCEDURE — 72220 X-RAY EXAM SACRUM TAILBONE: CPT | Mod: TC,PO

## 2017-09-18 PROCEDURE — 72100 X-RAY EXAM L-S SPINE 2/3 VWS: CPT | Mod: TC,PO

## 2017-09-18 PROCEDURE — 72220 X-RAY EXAM SACRUM TAILBONE: CPT | Mod: 26,,, | Performed by: RADIOLOGY

## 2017-09-18 PROCEDURE — 99205 OFFICE O/P NEW HI 60 MIN: CPT | Mod: S$GLB,,, | Performed by: INTERNAL MEDICINE

## 2017-09-18 PROCEDURE — 72100 X-RAY EXAM L-S SPINE 2/3 VWS: CPT | Mod: 26,,, | Performed by: RADIOLOGY

## 2017-09-18 PROCEDURE — 99999 PR PBB SHADOW E&M-EST. PATIENT-LVL III: CPT | Mod: PBBFAC,,, | Performed by: INTERNAL MEDICINE

## 2017-09-18 NOTE — PROGRESS NOTES
Subjective:       Patient ID: Juan Ramirez is a 36 y.o. female.    Chief Complaint: Consult    Hpi: h/o hashmoto's thyroiditis and lumbar sacral pain, a rash that started with a gluten free diet, resolved with treatment.         Complaint / Symptoms Based HPI      Arthritis        She complains of joint warmth. She reports no joint swelling. Affected locations include the neck. Associated symptoms include pain at night. Pertinent negatives include no dysuria, fatigue, fever, pain while resting, Raynaud's syndrome, trouble swallowing, myalgias, dry eyes, headaches, jaw pain or facial pain. She complains of morning stiffness.    Factors aggravating her arthritis include activity and sitting.     Review of Systems   Constitutional: Negative for activity change, appetite change, chills, diaphoresis, fatigue, fever and unexpected weight change.   HENT: Negative for congestion, dental problem, ear discharge, ear pain, facial swelling, mouth sores, nosebleeds, postnasal drip, rhinorrhea, sinus pressure, sneezing, sore throat, tinnitus, trouble swallowing and voice change.    Eyes: Negative for photophobia, pain, discharge, redness and itching.   Respiratory: Negative for apnea, cough, chest tightness, shortness of breath and wheezing.    Cardiovascular: Negative for chest pain, palpitations and leg swelling.   Gastrointestinal: Negative for abdominal distention, abdominal pain, constipation, diarrhea, nausea and vomiting.   Endocrine: Negative for cold intolerance, heat intolerance, polydipsia and polyuria.   Genitourinary: Negative for decreased urine volume, difficulty urinating, dysuria, flank pain, frequency, hematuria and urgency.   Musculoskeletal: Positive for back pain. Negative for arthralgias, gait problem, joint swelling, myalgias, neck pain and neck stiffness.   Skin: Negative for pallor, rash and wound.   Allergic/Immunologic: Negative for immunocompromised state.   Neurological: Negative for dizziness,  tremors, weakness, numbness and headaches.   Hematological: Negative for adenopathy. Does not bruise/bleed easily.   Psychiatric/Behavioral: Negative for sleep disturbance. The patient is not nervous/anxious.          Objective:   /72 (BP Location: Left arm, Patient Position: Sitting, BP Method: Medium (Automatic))   Pulse 64   Wt 51.9 kg (114 lb 6.7 oz)   LMP 08/15/2017   BMI 21.62 kg/m²      Physical Exam   Vitals reviewed.  Constitutional: She is oriented to person, place, and time and well-developed, well-nourished, and in no distress.   HENT:   Head: Normocephalic and atraumatic.   Mouth/Throat: Oropharynx is clear and moist.   Eyes: EOM are normal. Pupils are equal, round, and reactive to light.   Neck: Neck supple. No thyromegaly present.   Cardiovascular: Normal rate, regular rhythm and normal heart sounds.  Exam reveals no gallop and no friction rub.    No murmur heard.  Pulmonary/Chest: Breath sounds normal. She has no wheezes. She has no rales. She exhibits no tenderness.   Abdominal: There is no tenderness. There is no rebound and no guarding.       Right Side Rheumatological Exam     Examination finds the shoulder, elbow, wrist, knee, 1st PIP, 1st MCP, 2nd PIP, 2nd MCP, 3rd PIP, 3rd MCP, 4th PIP, 4th MCP, 5th PIP and 5th MCP normal.    Shoulder Exam   Tenderness Location: no tenderness    Range of Motion   Active Abduction: abnormal   Adduction: abnormal  Sensation: normal    Knee Exam   Patellofemoral Crepitus: negative  Effusion: negative  Sensation: normal    Hip Exam   Tenderness Location: no tenderness  Sensation: normal    Elbow/Wrist Exam   Tenderness Location: no tenderness  Sensation: normal    Left Side Rheumatological Exam     Examination finds the shoulder, elbow, wrist, knee, 1st PIP, 1st MCP, 2nd PIP, 2nd MCP, 3rd PIP, 3rd MCP, 4th PIP, 4th MCP, 5th PIP and 5th MCP normal.    Shoulder Exam   Tenderness Location: no tenderness    Range of Motion   Active Abduction: abnormal    Sensation: normal    Knee Exam     Patellofemoral Crepitus: negative  Effusion: negative  Sensation: normal    Hip Exam   Tenderness Location: no tenderness  Sensation: normal    Elbow/Wrist Exam   Sensation: normal      Back/Neck Exam   General Inspection   Gait: normal       Tenderness Right paramedian tenderness of the Lower L-Spine.Left paramedian tenderness of the Lower L-Spine.    Back Range of Motion   Lateral Bend Right: abnormal  Lateral Bend Left: abnormal  Rotation Right: abnormal  Rotation Left: abnormal      Lymphadenopathy:     She has no cervical adenopathy.   Neurological: She is alert and oriented to person, place, and time. Gait normal.   Skin: No rash noted. No erythema. No pallor.     Psychiatric: Mood and affect normal.   Musculoskeletal: She exhibits tenderness.           Results for orders placed or performed in visit on 09/14/17   Thyroid peroxidase antibody   Result Value Ref Range    Thyroperoxidase Antibodies 90.5 (H) <6.0 IU/mL   T4, free   Result Value Ref Range    Free T4 1.14 0.71 - 1.51 ng/dL   TSH   Result Value Ref Range    TSH 2.549 0.400 - 4.000 uIU/mL   Vitamin D   Result Value Ref Range    Vit D, 25-Hydroxy 24 (L) 30 - 96 ng/mL       Assessment:       1. Headache, unspecified headache type    2. Visual changes    3. Autoimmune thyroiditis    4. Malar rash    5. Low back pain without sciatica, unspecified back pain laterality, unspecified chronicity    6. Sacral back pain            Plan:       Juan was seen today for consult.    Diagnoses and all orders for this visit:    Headache, unspecified headache type  -     DOUGLAS; Future  -     Anti Sm/RNP Antibody; Future  -     Anti-DNA antibody, double-stranded; Future  -     Anti-Histone Antibody; Future  -     Anti-scleroderma antibody; Future  -     Anti-Smith antibody; Future  -     Sjogrens syndrome-A extractable nuclear antibody; Future  -     Sjogrens syndrome-B extractable nuclear antibody; Future  -     HLA B27 Antigen;  Future  -     Anti-thyroglobulin antibody; Future  -     Sedimentation rate, manual; Future  -     MRI Brain Without Contrast; Future  -     X-Ray Sacrum And Coccyx; Future  -     X-Ray Lumbar Spine Ap And Lateral; Future  -     X-Ray Lumbar Spine Ap And Lateral  -     X-Ray Lumbar Spine Ap And Lateral; Future    Visual changes  -     DOUGLAS; Future  -     Anti Sm/RNP Antibody; Future  -     Anti-DNA antibody, double-stranded; Future  -     Anti-Histone Antibody; Future  -     Anti-scleroderma antibody; Future  -     Anti-Smith antibody; Future  -     Sjogrens syndrome-A extractable nuclear antibody; Future  -     Sjogrens syndrome-B extractable nuclear antibody; Future  -     HLA B27 Antigen; Future  -     Anti-thyroglobulin antibody; Future  -     Sedimentation rate, manual; Future  -     MRI Brain Without Contrast; Future  -     X-Ray Sacrum And Coccyx; Future  -     X-Ray Lumbar Spine Ap And Lateral; Future  -     X-Ray Lumbar Spine Ap And Lateral  -     X-Ray Lumbar Spine Ap And Lateral; Future    Autoimmune thyroiditis  -     DOUGLAS; Future  -     Anti Sm/RNP Antibody; Future  -     Anti-DNA antibody, double-stranded; Future  -     Anti-Histone Antibody; Future  -     Anti-scleroderma antibody; Future  -     Anti-Smith antibody; Future  -     Sjogrens syndrome-A extractable nuclear antibody; Future  -     Sjogrens syndrome-B extractable nuclear antibody; Future  -     HLA B27 Antigen; Future  -     Anti-thyroglobulin antibody; Future  -     Sedimentation rate, manual; Future  -     MRI Brain Without Contrast; Future  -     X-Ray Sacrum And Coccyx; Future  -     X-Ray Lumbar Spine Ap And Lateral; Future  -     X-Ray Lumbar Spine Ap And Lateral  -     X-Ray Lumbar Spine Ap And Lateral; Future    Malar rash  -     DOUGLAS; Future  -     Anti Sm/RNP Antibody; Future  -     Anti-DNA antibody, double-stranded; Future  -     Anti-Histone Antibody; Future  -     Anti-scleroderma antibody; Future  -     Anti-Smith antibody;  Future  -     Sjogrens syndrome-A extractable nuclear antibody; Future  -     Sjogrens syndrome-B extractable nuclear antibody; Future  -     HLA B27 Antigen; Future  -     Anti-thyroglobulin antibody; Future  -     Sedimentation rate, manual; Future  -     MRI Brain Without Contrast; Future  -     X-Ray Sacrum And Coccyx; Future  -     X-Ray Lumbar Spine Ap And Lateral; Future  -     X-Ray Lumbar Spine Ap And Lateral    Low back pain without sciatica, unspecified back pain laterality, unspecified chronicity  -     DOUGLAS; Future  -     Anti Sm/RNP Antibody; Future  -     Anti-DNA antibody, double-stranded; Future  -     Anti-Histone Antibody; Future  -     Anti-scleroderma antibody; Future  -     Anti-Smith antibody; Future  -     Sjogrens syndrome-A extractable nuclear antibody; Future  -     Sjogrens syndrome-B extractable nuclear antibody; Future  -     HLA B27 Antigen; Future  -     Anti-thyroglobulin antibody; Future  -     Sedimentation rate, manual; Future  -     MRI Brain Without Contrast; Future  -     X-Ray Sacrum And Coccyx; Future  -     X-Ray Lumbar Spine Ap And Lateral; Future  -     X-Ray Lumbar Spine Ap And Lateral  -     X-Ray Lumbar Spine Ap And Lateral; Future    Sacral back pain  -     DOUGLAS; Future  -     Anti Sm/RNP Antibody; Future  -     Anti-DNA antibody, double-stranded; Future  -     Anti-Histone Antibody; Future  -     Anti-scleroderma antibody; Future  -     Anti-Smith antibody; Future  -     Sjogrens syndrome-A extractable nuclear antibody; Future  -     Sjogrens syndrome-B extractable nuclear antibody; Future  -     HLA B27 Antigen; Future  -     Anti-thyroglobulin antibody; Future  -     Sedimentation rate, manual; Future  -     MRI Brain Without Contrast; Future  -     X-Ray Sacrum And Coccyx; Future  -     X-Ray Lumbar Spine Ap And Lateral; Future  -     X-Ray Lumbar Spine Ap And Lateral  -     X-Ray Lumbar Spine Ap And Lateral; Future     likely oa of the sacrum but has autoimmune  thyroiditis.

## 2017-09-18 NOTE — LETTER
September 21, 2017      Violeta James, DILLON  [address] [facility] [address] [place] [address]           [place] [facility]  [address] [facility] [address] [place] [address]  Phone: [phone]  Fax: [phone]          Patient: Juan Ramirez   MR Number: [id number]   YOB: 1980   Date of Visit: 9/18/2017       Dear Violeta James:    Thank you for referring Juan Ramirez to me for evaluation. Attached you will find relevant portions of my assessment and plan of care.    If you have questions, please do not hesitate to call me. I look forward to following Juan Ramirez along with you.    Sincerely,        Enclosure  CC:  No Recipients    If you would like to receive this communication electronically, please contact [email or web address] or [phone] to request more information on [email or web address] Link access.    For providers and/or their staff who would like to refer a patient to [facility], please contact us through our one-stop-shop provider referral line, Anjelica Calabrese, at [phone].    If you feel you have received this communication in error or would no longer like to receive these types of communications, please e-mail [email or web address]

## 2017-09-26 ENCOUNTER — HOSPITAL ENCOUNTER (OUTPATIENT)
Dept: RADIOLOGY | Facility: HOSPITAL | Age: 37
Discharge: HOME | End: 2017-09-26
Attending: INTERNAL MEDICINE
Payer: COMMERCIAL

## 2017-09-26 DIAGNOSIS — R51.9 HEADACHE, UNSPECIFIED HEADACHE TYPE: ICD-10-CM

## 2017-09-26 DIAGNOSIS — E06.3 AUTOIMMUNE THYROIDITIS: ICD-10-CM

## 2017-09-26 DIAGNOSIS — M53.3 SACRAL BACK PAIN: ICD-10-CM

## 2017-09-26 DIAGNOSIS — H53.9 VISUAL CHANGES: ICD-10-CM

## 2017-09-26 DIAGNOSIS — R21 MALAR RASH: ICD-10-CM

## 2017-09-26 DIAGNOSIS — M54.50 LOW BACK PAIN WITHOUT SCIATICA, UNSPECIFIED BACK PAIN LATERALITY, UNSPECIFIED CHRONICITY: ICD-10-CM

## 2017-09-26 PROCEDURE — 70551 MRI BRAIN STEM W/O DYE: CPT | Mod: 26,,, | Performed by: RADIOLOGY

## 2017-09-26 PROCEDURE — 70551 MRI BRAIN STEM W/O DYE: CPT | Mod: TC,PO

## 2017-10-13 ENCOUNTER — TELEPHONE (OUTPATIENT)
Dept: ENDOCRINOLOGY | Facility: CLINIC | Age: 37
End: 2017-10-13

## 2017-10-13 DIAGNOSIS — E03.8 HYPOTHYROIDISM DUE TO HASHIMOTO'S THYROIDITIS: Primary | ICD-10-CM

## 2017-10-13 DIAGNOSIS — E06.3 HYPOTHYROIDISM DUE TO HASHIMOTO'S THYROIDITIS: Primary | ICD-10-CM

## 2017-10-13 RX ORDER — LEVOTHYROXINE SODIUM 50 UG/1
50 CAPSULE ORAL DAILY
Qty: 30 CAPSULE | Refills: 6 | Status: SHIPPED | OUTPATIENT
Start: 2017-10-13 | End: 2017-10-13 | Stop reason: SDUPTHER

## 2017-10-13 RX ORDER — LEVOTHYROXINE SODIUM 50 UG/1
50 CAPSULE ORAL DAILY
Qty: 30 CAPSULE | Refills: 6 | Status: SHIPPED | OUTPATIENT
Start: 2017-10-13 | End: 2017-11-20 | Stop reason: RX

## 2017-10-13 NOTE — TELEPHONE ENCOUNTER
----- Message from Sadie Liriano sent at 10/12/2017  4:28 PM CDT -----  Contact: Self [phone]  PT is requesting a prescription  for the natural alternative to synthroid     ..  [facility] [employer] [id number] [place] [address] & [address] [address]  Phone: [phone] Fax: [phone]

## 2017-10-26 ENCOUNTER — LAB VISIT (OUTPATIENT)
Dept: LAB | Facility: HOSPITAL | Age: 37
End: 2017-10-26
Attending: OBSTETRICS & GYNECOLOGY
Payer: COMMERCIAL

## 2017-10-26 DIAGNOSIS — E03.8 HYPOTHYROIDISM DUE TO HASHIMOTO'S THYROIDITIS: ICD-10-CM

## 2017-10-26 DIAGNOSIS — E06.3 HYPOTHYROIDISM DUE TO HASHIMOTO'S THYROIDITIS: ICD-10-CM

## 2017-10-26 LAB
T4 FREE SERPL-MCNC: 0.89 NG/DL
TSH SERPL DL<=0.005 MIU/L-ACNC: 5.13 UIU/ML

## 2017-10-26 PROCEDURE — 84439 ASSAY OF FREE THYROXINE: CPT

## 2017-10-26 PROCEDURE — 36415 COLL VENOUS BLD VENIPUNCTURE: CPT | Mod: PO

## 2017-10-26 PROCEDURE — 84443 ASSAY THYROID STIM HORMONE: CPT

## 2017-11-20 ENCOUNTER — OFFICE VISIT (OUTPATIENT)
Dept: RHEUMATOLOGY | Facility: CLINIC | Age: 37
End: 2017-11-20
Payer: COMMERCIAL

## 2017-11-20 VITALS
HEIGHT: 61 IN | WEIGHT: 114 LBS | SYSTOLIC BLOOD PRESSURE: 101 MMHG | DIASTOLIC BLOOD PRESSURE: 66 MMHG | HEART RATE: 85 BPM | BODY MASS INDEX: 21.52 KG/M2

## 2017-11-20 DIAGNOSIS — E06.3 AUTOIMMUNE THYROIDITIS: Primary | ICD-10-CM

## 2017-11-20 DIAGNOSIS — M54.50 LOW BACK PAIN WITHOUT SCIATICA, UNSPECIFIED BACK PAIN LATERALITY, UNSPECIFIED CHRONICITY: ICD-10-CM

## 2017-11-20 DIAGNOSIS — M41.9 SCOLIOSIS OF THORACOLUMBAR SPINE, UNSPECIFIED SCOLIOSIS TYPE: ICD-10-CM

## 2017-11-20 DIAGNOSIS — M19.90 OSTEOARTHRITIS, UNSPECIFIED OSTEOARTHRITIS TYPE, UNSPECIFIED SITE: ICD-10-CM

## 2017-11-20 DIAGNOSIS — R51.9 HEADACHE, UNSPECIFIED HEADACHE TYPE: ICD-10-CM

## 2017-11-20 PROCEDURE — 99214 OFFICE O/P EST MOD 30 MIN: CPT | Mod: S$GLB,,, | Performed by: INTERNAL MEDICINE

## 2017-11-20 PROCEDURE — 99999 PR PBB SHADOW E&M-EST. PATIENT-LVL III: CPT | Mod: PBBFAC,,, | Performed by: INTERNAL MEDICINE

## 2017-11-20 RX ORDER — DICLOFENAC SODIUM 10 MG/G
2 GEL TOPICAL 4 TIMES DAILY
Qty: 200 G | Refills: 4 | Status: SHIPPED | OUTPATIENT
Start: 2017-11-20 | End: 2018-11-15

## 2017-11-20 RX ORDER — LEVOTHYROXINE SODIUM 25 UG/1
25 TABLET ORAL DAILY
COMMUNITY
End: 2018-02-06 | Stop reason: SDUPTHER

## 2017-11-20 NOTE — PROGRESS NOTES
"Subjective:       Patient ID: Juan Ramirez is a 36 y.o. female.    Chief Complaint: Follow-up and Pain    Hpi: h/o hashmoto's thyroiditis and lumbar sacral pain, a rash that started with a gluten free diet, resolved with treatment.         Complaint / Symptoms Based HPI        Affected locations include the neck.     Factors aggravating her arthritis include activity and sitting.     Review of Systems   Constitutional: Negative for activity change, appetite change, chills, diaphoresis and unexpected weight change.   HENT: Negative for congestion, dental problem, ear discharge, ear pain, facial swelling, mouth sores, nosebleeds, postnasal drip, rhinorrhea, sinus pressure, sneezing, sore throat, tinnitus and voice change.    Eyes: Negative for photophobia, pain, discharge, redness and itching.   Respiratory: Negative for apnea, cough, chest tightness, shortness of breath and wheezing.    Cardiovascular: Negative for chest pain, palpitations and leg swelling.   Gastrointestinal: Negative for abdominal distention, abdominal pain, constipation, diarrhea, nausea and vomiting.   Endocrine: Negative for cold intolerance, heat intolerance, polydipsia and polyuria.   Genitourinary: Negative for decreased urine volume, difficulty urinating, flank pain, frequency, hematuria and urgency.   Musculoskeletal: Positive for back pain. Negative for arthralgias, gait problem, neck pain and neck stiffness.   Skin: Negative for pallor, rash and wound.   Allergic/Immunologic: Negative for immunocompromised state.   Neurological: Negative for dizziness, tremors, weakness and numbness.   Hematological: Negative for adenopathy. Does not bruise/bleed easily.   Psychiatric/Behavioral: Negative for sleep disturbance. The patient is not nervous/anxious.          Objective:   /66 (BP Location: Left arm, Patient Position: Sitting, BP Method: Small (Manual))   Pulse 85   Ht 5' 1" (1.549 m)   Wt 51.7 kg (114 lb)   LMP 11/12/2017 " (Approximate)   BMI 21.54 kg/m²      Physical Exam   Vitals reviewed.  Constitutional: She is oriented to person, place, and time and well-developed, well-nourished, and in no distress.   HENT:   Head: Normocephalic and atraumatic.   Mouth/Throat: Oropharynx is clear and moist.   Eyes: EOM are normal. Pupils are equal, round, and reactive to light.   Neck: Neck supple. No thyromegaly present.   Cardiovascular: Normal rate, regular rhythm and normal heart sounds.  Exam reveals no gallop and no friction rub.    No murmur heard.  Pulmonary/Chest: Breath sounds normal. She has no wheezes. She has no rales. She exhibits no tenderness.   Abdominal: There is no tenderness. There is no rebound and no guarding.       Right Side Rheumatological Exam     Examination finds the shoulder, elbow, wrist, knee, 1st PIP, 1st MCP, 2nd PIP, 2nd MCP, 3rd PIP, 3rd MCP, 4th PIP, 4th MCP, 5th PIP and 5th MCP normal.    Shoulder Exam   Tenderness Location: no tenderness    Range of Motion   Active Abduction: abnormal   Adduction: abnormal  Sensation: normal    Knee Exam   Patellofemoral Crepitus: negative  Effusion: negative  Sensation: normal    Hip Exam   Tenderness Location: no tenderness  Sensation: normal    Elbow/Wrist Exam   Tenderness Location: no tenderness  Sensation: normal    Left Side Rheumatological Exam     Examination finds the shoulder, elbow, wrist, knee, 1st PIP, 1st MCP, 2nd PIP, 2nd MCP, 3rd PIP, 3rd MCP, 4th PIP, 4th MCP, 5th PIP and 5th MCP normal.    Shoulder Exam   Tenderness Location: no tenderness    Range of Motion   Active Abduction: abnormal   Sensation: normal    Knee Exam     Patellofemoral Crepitus: negative  Effusion: negative  Sensation: normal    Hip Exam   Tenderness Location: no tenderness  Sensation: normal    Elbow/Wrist Exam   Sensation: normal      Back/Neck Exam   General Inspection   Gait: normal       Tenderness Right paramedian tenderness of the Lower L-Spine.Left paramedian tenderness of the  Lower L-Spine.    Back Range of Motion   Lateral Bend Right: abnormal  Lateral Bend Left: abnormal  Rotation Right: abnormal  Rotation Left: abnormal      Lymphadenopathy:     She has no cervical adenopathy.   Neurological: She is alert and oriented to person, place, and time. Gait normal.   Skin: No rash noted. No erythema. No pallor.     Psychiatric: Mood and affect normal.   Musculoskeletal: She exhibits tenderness.           Results for orders placed or performed in visit on 10/26/17   TSH   Result Value Ref Range    TSH 5.135 (H) 0.400 - 4.000 uIU/mL   T4, free   Result Value Ref Range    Free T4 0.89 0.71 - 1.51 ng/dL   Narrative   Technique: AP and lateral radiographs of the lumbar spine were acquired    Comparison: None    Findings:    Image quality is degraded by patient body habitus.  There is a levoconvex scoliotic curvature of the thoracolumbar spine, apex at T12-L1.  No acute lumbar compression fracture or osseous destructive process appreciated.  There is disc space narrowing present at L5-S1.  No pars defects.  The sacroiliac joints are unremarkable.  There is prominent degenerative facet arthropathy at L5-S1 and L4-L5.   Impression    As above       Assessment:       1. Autoimmune thyroiditis    2. Low back pain without sciatica, unspecified back pain laterality, unspecified chronicity    3. Headache, unspecified headache type    4. Scoliosis of thoracolumbar spine, unspecified scoliosis type    5. Osteoarthritis, unspecified osteoarthritis type, unspecified site            Plan:       Juan was seen today for follow-up and pain.    Diagnoses and all orders for this visit:    Autoimmune thyroiditis  -     diclofenac sodium 1 % Gel; Apply 2 g topically 4 (four) times daily.    Low back pain without sciatica, unspecified back pain laterality, unspecified chronicity  -     diclofenac sodium 1 % Gel; Apply 2 g topically 4 (four) times daily.    Headache, unspecified headache type  -     diclofenac sodium  1 % Gel; Apply 2 g topically 4 (four) times daily.    Scoliosis of thoracolumbar spine, unspecified scoliosis type  -     diclofenac sodium 1 % Gel; Apply 2 g topically 4 (four) times daily.    Osteoarthritis, unspecified osteoarthritis type, unspecified site  -     diclofenac sodium 1 % Gel; Apply 2 g topically 4 (four) times daily.     likely oa of the sacrum but has autoimmune thyroiditis.

## 2017-12-05 ENCOUNTER — TELEPHONE (OUTPATIENT)
Dept: RHEUMATOLOGY | Facility: CLINIC | Age: 37
End: 2017-12-05

## 2017-12-05 NOTE — TELEPHONE ENCOUNTER
Spoke to pt, she reports that she is missing a lot of work to to the amount of pain she has been in. She has been advised to take a temporary leave and will need paperwork filled out. Advised she can bring paperwork by office or have it faxed. Pt will bring them by as soon as she has them. advised it is a two turnarund for paperwork.

## 2017-12-05 NOTE — TELEPHONE ENCOUNTER
----- Message from Brenda Connors sent at 12/4/2017 12:02 PM CST -----  Contact: Self  Patient is stating that she is taking some time off from work due to the pain she is experiencing and would like to know if Doctor Mariluz would fill out and sign off on her paperwork.  Please call [phone] or [phone].  Thanks

## 2017-12-11 ENCOUNTER — TELEPHONE (OUTPATIENT)
Dept: RHEUMATOLOGY | Facility: CLINIC | Age: 37
End: 2017-12-11

## 2017-12-11 NOTE — TELEPHONE ENCOUNTER
----- Message from Yakelin Quiroga sent at 12/8/2017  1:07 PM CST -----  Contact: self  Patient called regarding paperwork dropped off at the office yesterday. Stating the date given was not correct, need to give correct information. Please contact [phone]

## 2017-12-11 NOTE — TELEPHONE ENCOUNTER
Spoke to pt, she advises the paperwork she dropped off to [employer] on Thursday needs to be for 12-15 weeks out of work. Please call her back with any further questions.

## 2017-12-18 ENCOUNTER — OFFICE VISIT (OUTPATIENT)
Dept: PRIMARY CARE CLINIC | Facility: CLINIC | Age: 37
End: 2017-12-18
Payer: COMMERCIAL

## 2017-12-18 VITALS
TEMPERATURE: 98 F | OXYGEN SATURATION: 99 % | HEART RATE: 67 BPM | HEIGHT: 61 IN | BODY MASS INDEX: 21.44 KG/M2 | DIASTOLIC BLOOD PRESSURE: 68 MMHG | SYSTOLIC BLOOD PRESSURE: 94 MMHG | WEIGHT: 113.56 LBS

## 2017-12-18 DIAGNOSIS — H10.33 ACUTE BACTERIAL CONJUNCTIVITIS OF BOTH EYES: Primary | ICD-10-CM

## 2017-12-18 DIAGNOSIS — J02.9 SORE THROAT: ICD-10-CM

## 2017-12-18 DIAGNOSIS — J98.8 CONGESTION OF RESPIRATORY TRACT: ICD-10-CM

## 2017-12-18 PROCEDURE — 99999 PR PBB SHADOW E&M-EST. PATIENT-LVL IV: CPT | Mod: PBBFAC,,, | Performed by: NURSE PRACTITIONER

## 2017-12-18 PROCEDURE — 99214 OFFICE O/P EST MOD 30 MIN: CPT | Mod: S$GLB,,, | Performed by: NURSE PRACTITIONER

## 2017-12-18 RX ORDER — POLYMYXIN B SULFATE AND TRIMETHOPRIM 1; 10000 MG/ML; [USP'U]/ML
1 SOLUTION OPHTHALMIC EVERY 6 HOURS
Qty: 10 ML | Refills: 0 | Status: SHIPPED | OUTPATIENT
Start: 2017-12-18 | End: 2018-01-05

## 2017-12-18 RX ORDER — AMOXICILLIN 500 MG/1
500 TABLET, FILM COATED ORAL EVERY 12 HOURS
Qty: 14 TABLET | Refills: 0 | Status: SHIPPED | OUTPATIENT
Start: 2017-12-18 | End: 2017-12-25

## 2017-12-18 NOTE — PATIENT INSTRUCTIONS
Conjunctivitis Caused by Infection     Wash hands often to help prevent spreading infection.     Infections are caused by viruses or germs (bacteria). Treatment includes keeping your eyes and hands clean. Your healthcare provider may prescribe eye drops, and tell you to stay home from work or school if youre contagious. Untreated infections can be serious. It's important to see your provider for a diagnosis.  Viral infections  A cold, flu, or other virus can spread to your eyes. This causes a watery discharge. Your eyes may burn or itch and get red. Your eyelids may also be puffy and sore.  Treatment  Most viral infections go away on their own. Artificial tears and warm compresses can relieve symptoms. Your provider may also prescribe eye drops. A viral infection can be very contagious and spreads quickly. To prevent this, wash your hands often. Use a separate tissue to wipe each eye. Dont touch your eyes or share bedding or towels.   Bacterial infections  Bacterial infections often occur in one eye. There may be a watery or a thick discharge from the eye. These infections can cause serious damage to your eye if not treated promptly.  Treatment  Your provider may prescribe eye drops or ointment to kill the bacteria. Warm compresses can help keep the eyelids clean. To keep the bacteria from spreading, wash your hands often. Use a separate tissue to wipe each eye. Dont touch your eyes or share bedding or towels.  Date Last Reviewed: 6/11/2015  © 7476-2442 [employer] [address] [place] [address], [place] [address]. All rights reserved. This information is not intended as a substitute for professional medical care. Always follow your healthcare professional's instructions.      Self-Care for Sore Throats    Sore throats happen for many reasons, such as colds, allergies, and infections caused by viruses or bacteria. In any case, your throat becomes red and sore. Your goal for self-care is to reduce your  discomfort while giving your throat a chance to heal.  Moisten and soothe your throat  Tips include the following:  · Try a sip of water first thing after waking up.  · Keep your throat moist by drinking 6 or more glasses of clear liquids every day.  · Run a cool-air humidifier in your room overnight.  · Avoid cigarette smoke.   · Suck on throat lozenges, cough drops, hard candy, ice chips, or frozen fruit-juice bars. Use the sugar-free versions if your diet or medical condition requires them.  Gargle to ease irritation  Gargling every hour or 2 can ease irritation. Try gargling with 1 of these solutions:  · 1/4 teaspoon of salt in 1/2 cup of warm water  · An over-the-counter anesthetic gargle  Use medicine for more relief  Over-the-counter medicine can reduce sore throat symptoms. Ask your pharmacist if you have questions about which medicine to use:  · Ease pain with anesthetic sprays. Aspirin or an aspirin substitute also helps. Remember, never give aspirin to anyone 18 or younger, or if you are already taking blood thinners.   · For sore throats caused by allergies, try antihistamines to block the allergic reaction.  · Remember: unless a sore throat is caused by a bacterial infection, antibiotics wont help you.  Prevent future sore throats  Prevention tips include the following:  · Stop smoking or reduce contact with secondhand smoke. Smoke irritates the tender throat lining.  · Limit contact with pets and with allergy-causing substances, such as pollen and mold.  · When youre around someone with a sore throat or cold, wash your hands often to keep viruses or bacteria from spreading.  · Dont strain your vocal cords.  Call your healthcare provider  Contact your healthcare provider if you have:  · A temperature over 101°F (38.3°C)  · White spots on the throat  · Great difficulty swallowing  · Trouble breathing  · A skin rash  · Recent exposure to someone else with strep bacteria  · Severe hoarseness and swollen  glands in the neck or jaw   Date Last Reviewed: 8/1/2016  © 6761-3757 [employer] [address] [place] [address], [place] [address]. All rights reserved. This information is not intended as a substitute for professional medical care. Always follow your healthcare professional's instructions.      If you are not better in 3 days, if worse or you have concerns or questions, please do not hesitate to call.  You can reach us at [phone] Monday through Friday (except holidays) 12 nooon to 8 p.m.    Thank you for using the [facility]!    MAGALYS Cruz, FNP-BC  [facility]  [facility]

## 2017-12-18 NOTE — PROGRESS NOTES
Subjective:       Patient ID: Juan Ramirez is a 36 y.o. female.    Chief Complaint: Sore Throat (started beginning of last week) and Conjunctivitis (started sunday)    Patient who is new to me presents with sore throat and red eyes.       Sore Throat    This is a new problem. The current episode started 1 to 4 weeks ago. The pain is worse on the left side. There has been no fever. The pain is moderate. Associated symptoms include ear pain, a plugged ear sensation and trouble swallowing. Pertinent negatives include no abdominal pain, congestion, coughing, diarrhea, drooling, ear discharge, headaches, hoarse voice, neck pain, shortness of breath, stridor, swollen glands or vomiting. Treatments tried: honey and allen. The treatment provided mild relief.   Conjunctivitis   This is a new problem. The current episode started yesterday. The problem occurs daily. The problem has been gradually worsening. Associated symptoms include a sore throat. Pertinent negatives include no abdominal pain, anorexia, arthralgias, change in bowel habit, chest pain, chills, congestion, coughing, diaphoresis, fatigue, fever, headaches, joint swelling, myalgias, nausea, neck pain, numbness, rash, swollen glands, urinary symptoms, vertigo, visual change, vomiting or weakness. Associated symptoms comments: Burning, discharge and crust in the AM, no visual changes, and watery discharge. Nothing aggravates the symptoms. She has tried nothing for the symptoms.     Review of Systems   Constitutional: Negative for chills, diaphoresis, fatigue and fever.   HENT: Positive for ear pain, sore throat and trouble swallowing. Negative for congestion, drooling, ear discharge and hoarse voice.    Eyes: Positive for discharge, redness and itching. Negative for photophobia, pain and visual disturbance.   Respiratory: Negative for cough, shortness of breath and stridor.    Cardiovascular: Negative for chest pain.   Gastrointestinal: Negative for abdominal  pain, anorexia, change in bowel habit, diarrhea, nausea and vomiting.   Musculoskeletal: Negative for arthralgias, joint swelling, myalgias and neck pain.   Skin: Negative for rash.   Neurological: Negative for vertigo, weakness, numbness and headaches.       Objective:      Physical Exam   Constitutional: She is oriented to person, place, and time. Vital signs are normal. She appears well-developed and well-nourished. She is cooperative.   HENT:   Head: Normocephalic and atraumatic.   Right Ear: Hearing, tympanic membrane, external ear and ear canal normal.   Left Ear: Hearing, tympanic membrane, external ear and ear canal normal.   Nose: Nose normal. Right sinus exhibits no maxillary sinus tenderness and no frontal sinus tenderness. Left sinus exhibits no maxillary sinus tenderness and no frontal sinus tenderness.   Mouth/Throat: Posterior oropharyngeal erythema present.   Eyes: EOM are normal. Pupils are equal, round, and reactive to light. Right eye exhibits discharge. No foreign body present in the right eye. Left eye exhibits discharge. No foreign body present in the left eye. Right conjunctiva is injected. Left conjunctiva is injected.   Neck: Normal range of motion.   Cardiovascular: Normal rate, regular rhythm, normal heart sounds and intact distal pulses.    Pulmonary/Chest: Effort normal and breath sounds normal. No apnea. No respiratory distress.   Abdominal: Soft. Bowel sounds are normal.   Musculoskeletal: Normal range of motion.   Lymphadenopathy:        Head (right side): No submental, no submandibular, no tonsillar, no preauricular, no posterior auricular and no occipital adenopathy present.        Head (left side): No submental, no submandibular, no tonsillar, no preauricular, no posterior auricular and no occipital adenopathy present.     She has no cervical adenopathy.   Neurological: She is alert and oriented to person, place, and time.   Skin: Skin is warm and dry.   Nursing note and vitals  reviewed.      Assessment:       1. Acute bacterial conjunctivitis of both eyes    2. Sore throat    3. Congestion of respiratory tract        Plan:       Acute bacterial conjunctivitis of both eyes  -     polymyxin B sulf-trimethoprim (POLYTRIM) 10,000 unit- 1 mg/mL Drop; Place 1 drop into both eyes every 6 (six) hours.  Dispense: 10 mL; Refill: 0    Sore throat  -     amoxicillin (AMOXIL) 500 MG Tab; Take 1 tablet (500 mg total) by mouth every 12 (twelve) hours.  Dispense: 14 tablet; Refill: 0    Congestion of respiratory tract  -     amoxicillin (AMOXIL) 500 MG Tab; Take 1 tablet (500 mg total) by mouth every 12 (twelve) hours.  Dispense: 14 tablet; Refill: 0    Discussed OTCs and proper hand hygiene. Patient to follow up if no improvement or worsening of symptoms.

## 2017-12-18 NOTE — LETTER
December 18, 2017      [facility]  [address] [facility] [address] [place] [address]  Phone: [phone]       Patient: Juan Ramirez   YOB: 1980  Date of Visit: 12/18/2017    To Whom It May Concern:    Mamie Ramirez  was at [facility] on 12/18/2017. She may return to work/school on 12820/2017 with no restrictions. If you have any questions or concerns, or if I can be of further assistance, please do not hesitate to contact me.    Sincerely,    Gianna Lees, NP

## 2017-12-20 ENCOUNTER — TELEPHONE (OUTPATIENT)
Dept: PRIMARY CARE CLINIC | Facility: CLINIC | Age: 37
End: 2017-12-20

## 2017-12-20 RX ORDER — METHYLPREDNISOLONE 4 MG/1
TABLET ORAL
Qty: 1 PACKAGE | Refills: 0 | Status: SHIPPED | OUTPATIENT
Start: 2017-12-20 | End: 2018-01-05

## 2017-12-20 NOTE — TELEPHONE ENCOUNTER
"Called pt, she is still on antibiotic and eye drop. Eyes better but this AM woke up throat so bad called out of work,. Really sore every time swallow. Feels "tore up in there." she looked at throat and didn't nee anything that looked odd. Neck glands swollen and that is where throat pain is at. Now has a cough (since started antibiotic). She wasn't sure if the antibiotic is causing the cough or the cause of the throat worsening. Please advise.   "

## 2017-12-20 NOTE — TELEPHONE ENCOUNTER
Please let the patient know that I am glad to hear her eyes are better. I have sent in some steroids to help with the cough and sore throat. If anything worsens or new symptoms present I want her to follow up with us or her PCP. Thanks.

## 2017-12-20 NOTE — TELEPHONE ENCOUNTER
----- Message from Gemini Palma sent at 12/20/2017  1:55 PM CST -----  Saw Miss Katz 12/18 ... Taking her meds / still has a sore throat (feels very raw) and a dry cough /asking for advise on how she should take care of her throat /  could not go back to work today / asking to have her Dr mahan extended / call  [phone]

## 2017-12-28 ENCOUNTER — TELEPHONE (OUTPATIENT)
Dept: RHEUMATOLOGY | Facility: CLINIC | Age: 37
End: 2017-12-28

## 2017-12-28 NOTE — TELEPHONE ENCOUNTER
----- Message from Vidya Montes De Oca sent at 12/28/2017  2:15 PM CST -----  Contact: self  Patient [phone] is returning call to Nurse from earlier today/please call

## 2017-12-28 NOTE — TELEPHONE ENCOUNTER
Spoke with patient regarding FMLA paper work. Only needed the date changed on last day worked and resigned and refaxed to number on form. Patient stated got an extension to the end of next week.

## 2018-01-05 ENCOUNTER — OFFICE VISIT (OUTPATIENT)
Dept: ORTHOPEDICS | Facility: CLINIC | Age: 38
End: 2018-01-05
Payer: COMMERCIAL

## 2018-01-05 VITALS — WEIGHT: 113.13 LBS | BODY MASS INDEX: 21.36 KG/M2 | HEIGHT: 61 IN
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
## 2018-01-05 DIAGNOSIS — M54.16 LUMBAR RADICULOPATHY: Primary | ICD-10-CM

## 2018-01-05 PROCEDURE — 99204 OFFICE O/P NEW MOD 45 MIN: CPT | Mod: S$GLB,,, | Performed by: ORTHOPAEDIC SURGERY

## 2018-01-05 PROCEDURE — 99999 PR PBB SHADOW E&M-EST. PATIENT-LVL II: CPT | Mod: PBBFAC,,, | Performed by: ORTHOPAEDIC SURGERY
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
## 2018-01-15 ENCOUNTER — TELEPHONE (OUTPATIENT)
Dept: RHEUMATOLOGY | Facility: CLINIC | Age: 38
End: 2018-01-15

## 2018-01-15 NOTE — TELEPHONE ENCOUNTER
Returned patient call regarding new paper work. Patient stated awaiting ortho dr to refer to physical therapy. Patient will come in tomorrow for nurse visit to receive information needed to complete disability forms. Patient verbalized understanding.

## 2018-01-15 NOTE — TELEPHONE ENCOUNTER
----- Message from Vitaliy Lindsey sent at 1/15/2018  3:29 PM CST -----  Contact: same  Patient called in and requested a message be sent over regarding her leave of absence from work.  Patient stated she received something else in the mail that needed to be filled out.  Patient would like to talk to Ilana about this.  Call back number is [phone]

## 2018-01-16 ENCOUNTER — CLINICAL SUPPORT (OUTPATIENT)
Dept: RHEUMATOLOGY | Facility: CLINIC | Age: 38
End: 2018-01-16
Payer: COMMERCIAL

## 2018-01-16 NOTE — PROGRESS NOTES
Patient presented to clinic needing FMLA paper work up dated. Nurse printed out office notes wrote a letter stating condition has increased. Nurse had Dr Mcgraw sign paperwork. Patient left facility with paper work in hand.
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
## 2018-01-22 ENCOUNTER — TELEPHONE (OUTPATIENT)
Dept: ORTHOPEDICS | Facility: CLINIC | Age: 38
End: 2018-01-22

## 2018-01-22 DIAGNOSIS — M54.16 LUMBAR RADICULOPATHY: Primary | ICD-10-CM

## 2018-02-02 ENCOUNTER — TELEPHONE (OUTPATIENT)
Dept: ENDOCRINOLOGY | Facility: CLINIC | Age: 38
End: 2018-02-02

## 2018-02-02 ENCOUNTER — CLINICAL SUPPORT (OUTPATIENT)
Dept: REHABILITATION | Facility: HOSPITAL | Age: 38
End: 2018-02-02
Attending: ORTHOPAEDIC SURGERY
Payer: COMMERCIAL

## 2018-02-02 DIAGNOSIS — G89.29 CHRONIC BILATERAL LOW BACK PAIN WITH LEFT-SIDED SCIATICA: ICD-10-CM

## 2018-02-02 DIAGNOSIS — R20.0 NUMBNESS OF LEFT FOOT: ICD-10-CM

## 2018-02-02 DIAGNOSIS — M54.42 CHRONIC BILATERAL LOW BACK PAIN WITH LEFT-SIDED SCIATICA: ICD-10-CM

## 2018-02-02 PROCEDURE — 97110 THERAPEUTIC EXERCISES: CPT | Mod: PO

## 2018-02-02 PROCEDURE — 97161 PT EVAL LOW COMPLEX 20 MIN: CPT | Mod: PO

## 2018-02-02 NOTE — PATIENT INSTRUCTIONS
Flexibility: Upper Trapezius Stretch    Sit up tall and place one hand behind your back or under your thigh and the other on top of your head.  Gently draw your head towards the side of your top hand. Do not over-stretch.  Hold 30 seconds.   Repeat 1 times each side per set. Do 1 sets per session. Do 1-3 sessions per day.        Scapular Retraction (Standing)    With arms at sides, squeeze shoulder blades together. Do not shrug and do not hold your breath. Hold 5 seconds.  Repeat 10 times per session. Do 1 sessions per day.         Diaphragmatic Breathing    Lying down, place one hand over your chest and one hand over your stomach. Take slow, deep breaths. The hand on the CHEST should not rise or fall. Attempt to push your bottom ribs out and your belly button up toward the ceiling as you breathe in. Take 5-6 seconds to fully exhale and repeat.  Repeat 1-2 minutes per session. Do 1 sessions per day.        Cat-Camel Stretch    Tuck chin and tighten stomach, rounding your back. Then look up, let your belly drop, and arch your back. Keep muscles engaged throughout the movement. Find pelvic neutral a couple times throughout set and hold 3-5 seconds before continuing.  Repeat 10 times per set. Do 1 sets per session. Do 1 sessions per day.         Pelvic Tilt    Flatten back by tightening stomach muscles and buttocks. Do not hold your breath.  Hold 5 seconds.  Repeat 10 times per set. Do 1 sets per session. Do 1 sessions per day.        Bent Leg Lift (Hook-Lying)    Tighten stomach and slowly raise right leg from floor. Keep trunk rigid. Slowly lower and switch sides.  Repeat 5 times per set. Do 1 sets per session. Do 1 sessions per day.    Copyright © [employer]. All rights reserved.

## 2018-02-02 NOTE — TELEPHONE ENCOUNTER
----- Message from Sadie Liriano sent at 2/2/2018 12:27 PM CST -----  Contact: Self [phone]  PT states she is taking levothyroxine (SYNTHROID) 50 MCG tablet and she thinks it is causing heart palpitations. She wants to know if the dosage can be decreased.    .Refill request.  levothyroxine (SYNTHROID) 25 MCG tablet  ..  [facility] [address] [place] [address] & [address] [address]  Phone: [phone] Fax: [phone]
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
## 2018-02-02 NOTE — PROGRESS NOTES
SEE EVALUATION IN PLAN OF CARE TAB      OUTPATIENT PHYSICAL THERAPY  PHYSICAL THERAPY EVALUATION    Name: Juan Ramirez  Clinic Number: [id number]    Evaluation Date: 2/2/2018  Visit #: 1 / 20  Authorization period Expiration: 12/31/2018  Plan of Care Expiration: 4/2/2018  Precautions: standard    Time In: 10:03 AM  Time Out: 11:00 AM  Total 1:1 Treatment Time: 55 min    Discussed Plan of Care with patient: Yes    Juan received 5 minutes of therapeutic exercise including:   HEP: UT stretch, scap retraction, cat camel, diaphragmatic breathing, PPT, supine marching  (NEXT VISIT: UBE standing, glute sets, hook lying ADD, bird-dog, Pallof, bridge on ball, UE ball on wall, scap wall clock, MET, L shoulder low grade mobs, R sacral PA's)      Written Home Exercises Provided: See Patient Instructions for 2/2/2018.  Exercises were reviewed and Juan was able to demonstrate them prior to the end of the session. Pt received a written copy of exercises to perform at home. Juan demonstrated good  understanding of the education provided.       Phoebe Riddle, PT

## 2018-02-05 ENCOUNTER — TELEPHONE (OUTPATIENT)
Dept: ENDOCRINOLOGY | Facility: CLINIC | Age: 38
End: 2018-02-05

## 2018-02-05 DIAGNOSIS — E06.3 HYPOTHYROIDISM DUE TO HASHIMOTO'S THYROIDITIS: Primary | ICD-10-CM

## 2018-02-05 DIAGNOSIS — E03.8 HYPOTHYROIDISM DUE TO HASHIMOTO'S THYROIDITIS: Primary | ICD-10-CM

## 2018-02-06 RX ORDER — LEVOTHYROXINE SODIUM 25 UG/1
25 TABLET ORAL
Qty: 30 TABLET | Refills: 6 | Status: SHIPPED | OUTPATIENT
Start: 2018-02-06 | End: 2018-10-19

## 2018-02-06 NOTE — TELEPHONE ENCOUNTER
Patient notified via phone. She stated she is experiencing heart palpitations and insomnia. She decreased Synthroid to 25 mcg once daily and states she is feeling better. She requests new Rx for Synthroid 25 mcg qd . Will send new Rx to pharmacy on file and will check labs in 4-6 weeks. The pt voiced understanding of the above information.

## 2018-02-15 ENCOUNTER — CLINICAL SUPPORT (OUTPATIENT)
Dept: REHABILITATION | Facility: HOSPITAL | Age: 38
End: 2018-02-15
Attending: ORTHOPAEDIC SURGERY
Payer: COMMERCIAL

## 2018-02-15 DIAGNOSIS — R20.0 NUMBNESS OF LEFT FOOT: ICD-10-CM

## 2018-02-15 DIAGNOSIS — G89.29 CHRONIC BILATERAL LOW BACK PAIN WITH LEFT-SIDED SCIATICA: ICD-10-CM

## 2018-02-15 DIAGNOSIS — M54.42 CHRONIC BILATERAL LOW BACK PAIN WITH LEFT-SIDED SCIATICA: ICD-10-CM

## 2018-02-15 PROCEDURE — 97110 THERAPEUTIC EXERCISES: CPT | Mod: PO

## 2018-02-15 PROCEDURE — 97140 MANUAL THERAPY 1/> REGIONS: CPT | Mod: PO

## 2018-02-15 NOTE — PROGRESS NOTES
Physical Therapy Daily Note   Name: Juan Ramirez  Clinic Number: [id number]    Evaluation Date: 2/2/2018  Visit #: 2 / 20  Authorization period Expiration: 12/31/2018  Plan of Care Expiration: 4/2/2018  Precautions: standard    Time In: 2:05 PM  Time Out: 3:00 PM  Total 1:1 Treatment Time: 55 min      Diagnosis:   Encounter Diagnoses   Name Primary?    Chronic bilateral low back pain with left-sided sciatica     Numbness of left foot      Physician: Topher Hernandez MD  Treatment Orders: PT Eval and Treat    Subjective   Pt reports: Pt. reports 100% compliance with HEP since last visit. Still stiff first thing in morning    Pain Scale:  5/10      Objective   In supine no LLD and R ASIS slightly inferior to L. In long sit, RLE shortens slightly.    Discussed Plan of Care with patient: Yes    Juan received 35 minutes of therapeutic exercise including:   UBE in standing FWD x 5 min  UT stretch 2 x 30 sec  scap retraction 10 x 5 sec  Prone glute sets 10 x 5 sec  bird-dog x 10 alt LE (manually block LLE in stance)  Quadruped fire hydrants x 10 each LE  cat camel x 10  diaphragmatic breathing x 2 min  PPT x 10  supine marching x 10  bridge on red physioball x 10 with arms by side  UE ball on wall x 10 CW/CCW  scap wall clock with yellow T-band x 4 each UE  (NEXT VISIT: Pallof)      Juan received 15 minutes of manual therapy including:   MET R hip ext + L hip flex 5 x 10 sec and ADD/ABD lock-up 3 x 10 sec each  R sacral PA glides Grade 2-3  (NEXT VISIT: L shoulder low grade mobs)    Written Home Exercises Provided: Patient educated to continue with previously issued HEP.  Exercises were reviewed and Juan was able to demonstrate them prior to the end of the session. Pt received a written copy of exercises to perform at home. Juan demonstrated good  understanding of the education provided.     Assessment   Progressing well with good body awareness and motor control  with initial HEP. Anticipate SX improvement with increasing core strength and motor control. Had expected difficulty performing new therex, requiring cues to maintain neutral spine and PT min assist to stabilize LLE in stance in quadruped compared to RLE.  Juan is progressing well towards her goals. Will benefit from con't skilled care.    Anticipated barriers to physical therapy: none  Pt's spiritual, cultural and educational needs considered and pt agreeable to plan of care and goals.    Plan   Continue with established Plan of Care towards Physical Therapy goals.      Phoebe Riddle, PT

## 2018-03-09 ENCOUNTER — CLINICAL SUPPORT (OUTPATIENT)
Dept: REHABILITATION | Facility: HOSPITAL | Age: 38
End: 2018-03-09
Attending: ORTHOPAEDIC SURGERY
Payer: COMMERCIAL

## 2018-03-09 DIAGNOSIS — M54.42 CHRONIC BILATERAL LOW BACK PAIN WITH LEFT-SIDED SCIATICA: ICD-10-CM

## 2018-03-09 DIAGNOSIS — G89.29 CHRONIC BILATERAL LOW BACK PAIN WITH LEFT-SIDED SCIATICA: ICD-10-CM

## 2018-03-09 DIAGNOSIS — R20.0 NUMBNESS OF LEFT FOOT: ICD-10-CM

## 2018-03-09 PROCEDURE — 97140 MANUAL THERAPY 1/> REGIONS: CPT | Mod: PO

## 2018-03-09 PROCEDURE — 97110 THERAPEUTIC EXERCISES: CPT | Mod: PO

## 2018-03-09 NOTE — PROGRESS NOTES
Physical Therapy Daily Note   Name: Juan Ramirez  Clinic Number: [id number]    Evaluation Date: 2/2/2018  Visit #: 3 / 20  Authorization period Expiration: 12/31/2018  Plan of Care Expiration: 4/2/2018  Precautions: standard    Time In: 10:05 AM  Time Out: 11:00 AM  Total 1:1 Treatment Time: 55 min      Diagnosis:   Encounter Diagnoses   Name Primary?    Chronic bilateral low back pain with left-sided sciatica     Numbness of left foot      Physician: Topher Hernandez MD  Treatment Orders: PT Eval and Treat    Subjective   Pt reports: Pt. reports 100% compliance with HEP since last visit. Still stiff first thing in morning    Pain Scale:  5/10      Objective   In supine no LLD and R ASIS slightly inferior to L. In long sit, RLE shortens slightly.    Discussed Plan of Care with patient: Yes    Juan received 35 minutes of therapeutic exercise including:   UBE in standing FWD x 5 min  UT stretch 2 x 30 sec  scap retraction 10 x 5 sec  diaphragmatic breathing x 2 min  PPT x 10  supine marching x 10  bridge on red physioball x 10 with arms by side  Prone glute sets 10 x 5 sec  bird-dog x 10 alt LE (manually block LLE in stance)  Quadruped fire hydrants x 10 each LE  cat camel x 10  UE ball on wall x 10 CW/CCW  scap wall clock with yellow T-band x 4 each UE  (NEXT VISIT: Pallof)      Juan received 15 minutes of manual therapy including:   MET R hip ext + L hip flex 5 x 10 sec and ADD/ABD lock-up 3 x 10 sec each  R sacral PA glides Grade 2-3  (NEXT VISIT: L shoulder low grade mobs)    Written Home Exercises Provided: Patient educated to continue with previously issued HEP. Increased to add push-pull SI muscle energy technique.  Exercises were reviewed and Juan was able to demonstrate them prior to the end of the session. Pt received a written copy of exercises to perform at home. Juan demonstrated good  understanding of the education provided.     Assessment    Progressing well with good body awareness and motor control with initial HEP. Anticipate SX improvement with increasing core strength and motor control. Had expected difficulty performing new therex, requiring cues to maintain neutral spine and PT min assist to stabilize LLE in stance in quadruped compared to RLE.  Juan is progressing well towards her goals. Will benefit from con't skilled care.    Anticipated barriers to physical therapy: none  Pt's spiritual, cultural and educational needs considered and pt agreeable to plan of care and goals.    Plan   Continue with established Plan of Care towards Physical Therapy goals.      Roslyn Morel, PT

## 2018-03-16 ENCOUNTER — CLINICAL SUPPORT (OUTPATIENT)
Dept: REHABILITATION | Facility: HOSPITAL | Age: 38
End: 2018-03-16
Attending: ORTHOPAEDIC SURGERY
Payer: COMMERCIAL

## 2018-03-16 DIAGNOSIS — R20.0 NUMBNESS OF LEFT FOOT: ICD-10-CM

## 2018-03-16 DIAGNOSIS — M54.42 CHRONIC BILATERAL LOW BACK PAIN WITH LEFT-SIDED SCIATICA: ICD-10-CM

## 2018-03-16 DIAGNOSIS — G89.29 CHRONIC BILATERAL LOW BACK PAIN WITH LEFT-SIDED SCIATICA: ICD-10-CM

## 2018-03-16 PROCEDURE — 97110 THERAPEUTIC EXERCISES: CPT | Mod: PO

## 2018-03-16 PROCEDURE — 97140 MANUAL THERAPY 1/> REGIONS: CPT | Mod: PO
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
## 2018-03-16 NOTE — PROGRESS NOTES
Physical Therapy Daily Note   Name: Juan Ramirez  Clinic Number: [id number]    Evaluation Date: 2/2/2018  Visit #: 4 / 20  Authorization period Expiration: 12/31/2018  Plan of Care Expiration: 4/5/2018  Precautions: standard    Time In: 11:05 AM  Time Out: 12:00 PM  Total 1:1 Treatment Time: 55 min      Diagnosis:   Encounter Diagnoses   Name Primary?    Chronic bilateral low back pain with left-sided sciatica     Numbness of left foot      Physician: Topher Hernandez MD  Treatment Orders: PT Eval and Treat    Subjective   Pt reports: SEE POC TAB    Pain Scale:  5/10      Objective     Discussed Plan of Care with patient: Yes    Juan received 40 minutes of therapeutic exercise including:   UBE in standing FWD x 5 min  UT stretch 2 x 30 sec  scap retraction 10 x 5 sec  diaphragmatic breathing x 2 min  PPT x 10  supine marching x 10  Prone Y/T/I with thumb up x 5 each UE  bridge on red physioball x 10 with arms by side  Prone glute sets 10 x 5 sec  scap wall clock with yellow T-band x 4 each UE    DID NOT PERFORM: bird-dog x 10 alt LE (manually block LLE in stance)  Quadruped fire hydrants x 10 each LE  UE ball on wall x 10 CW/CCW  (NEXT VISIT: Pallof)      Juan received 15 minutes of manual therapy including:   MET R hip ext + L hip flex 5 x 10 sec and ADD/ABD lock-up 10 x 10 sec each  R sacral PA glides Grade 2-3  L shoulder AP mobs Grade 2    Written Home Exercises Provided: Patient educated to continue with previously issued HEP. Increased to add push-pull SI muscle energy technique.  Exercises were reviewed and Juan was able to demonstrate them prior to the end of the session. Pt received a written copy of exercises to perform at home. Juan demonstrated good  understanding of the education provided.     Assessment   SEE POC TAB  Juan is progressing well towards her goals. Will benefit from con't skilled care.    Anticipated barriers to physical therapy:  none  Pt's spiritual, cultural and educational needs considered and pt agreeable to plan of care and goals.    Plan   SEE POC TAB    Phoebe Riddle, PT

## 2018-03-22 ENCOUNTER — LAB VISIT (OUTPATIENT)
Dept: LAB | Facility: HOSPITAL | Age: 38
End: 2018-03-22
Attending: INTERNAL MEDICINE
Payer: COMMERCIAL

## 2018-03-22 ENCOUNTER — CLINICAL SUPPORT (OUTPATIENT)
Dept: REHABILITATION | Facility: HOSPITAL | Age: 38
End: 2018-03-22
Attending: ORTHOPAEDIC SURGERY
Payer: COMMERCIAL

## 2018-03-22 DIAGNOSIS — R20.0 NUMBNESS OF LEFT FOOT: ICD-10-CM

## 2018-03-22 DIAGNOSIS — E06.3 HYPOTHYROIDISM DUE TO HASHIMOTO'S THYROIDITIS: ICD-10-CM

## 2018-03-22 DIAGNOSIS — G89.29 CHRONIC BILATERAL LOW BACK PAIN WITH LEFT-SIDED SCIATICA: ICD-10-CM

## 2018-03-22 DIAGNOSIS — M54.42 CHRONIC BILATERAL LOW BACK PAIN WITH LEFT-SIDED SCIATICA: ICD-10-CM

## 2018-03-22 DIAGNOSIS — E03.8 HYPOTHYROIDISM DUE TO HASHIMOTO'S THYROIDITIS: ICD-10-CM

## 2018-03-22 PROCEDURE — 97140 MANUAL THERAPY 1/> REGIONS: CPT | Mod: PO

## 2018-03-22 PROCEDURE — 36415 COLL VENOUS BLD VENIPUNCTURE: CPT | Mod: PO

## 2018-03-22 PROCEDURE — 84439 ASSAY OF FREE THYROXINE: CPT

## 2018-03-22 PROCEDURE — 84443 ASSAY THYROID STIM HORMONE: CPT

## 2018-03-22 PROCEDURE — 97110 THERAPEUTIC EXERCISES: CPT | Mod: PO

## 2018-03-22 NOTE — PROGRESS NOTES
Physical Therapy Daily Note   Name: Juan Ramirez  Clinic Number: [id number]    Evaluation Date: 2/2/2018  Visit #: 5 / 20  Authorization period Expiration: 12/31/2018  Plan of Care Expiration: 4/5/2018  Precautions: standard    Time In: 11:15 AM  Time Out: 12:00 PM  Total 1:1 Treatment Time: 45 min      Diagnosis:   Encounter Diagnoses   Name Primary?    Chronic bilateral low back pain with left-sided sciatica     Numbness of left foot      Physician: Topher Hernandez MD  Treatment Orders: PT Eval and Treat    Subjective   Pt reports: Still out of work. Returns to MD next week to determine RTW timeline.    Pain Scale:  5/10      Objective     Discussed Plan of Care with patient: Yes    Juan received 35 minutes of therapeutic exercise including:   UBE in standing FWD x 5 min  UT stretch 2 x 30 sec  scap retraction 10 x 5 sec  diaphragmatic breathing x 2 min  PPT x 10  supine marching x 10  bridge on red physioball x 10 with arms by side  Prone Y/T/I with thumb up x 5 each UE  Prone glute sets 10 x 5 sec  bird-dog x 10 alt LE (manually block LLE in stance)  Quadruped fire hydrants x 10 each LE  UE ball on wall x 10 CW/CCW  scap wall clock with yellow T-band x 4 each UE  Resisted lat walk with OHP with green T-band CLX x 20' each way  (NEXT VISIT: Pallof, Body Blade, side lying ER with retract)      Juan received 10 minutes of manual therapy including:   LLE long-axis distraction, brief, Grade 3  R sacral PA glides Grade 2-3  L shoulder AP mobs Grade 2  Scap mobs in side lying  DID NOT PERFORM: MET R hip ext + L hip flex 5 x 10 sec and ADD/ABD lock-up 10 x 10 sec each    Written Home Exercises Provided: Patient educated to continue with previously issued HEP. Increased to add push-pull SI muscle energy technique.  Exercises were reviewed and Juan was able to demonstrate them prior to the end of the session. Pt received a written copy of exercises to perform at  home. Juan demonstrated good  understanding of the education provided.     Assessment   Pt with late arrival. Tolerated all therex well with less asymmetry in LLE stance in quadruped. Had ongoing L shoulder pain with prone Y/T/I's and new OHP but did not persist.  Juan is progressing well towards her goals. Will benefit from con't skilled care.    Anticipated barriers to physical therapy: none  Pt's spiritual, cultural and educational needs considered and pt agreeable to plan of care and goals.    Plan   Continue with established Plan of Care towards Physical Therapy goals.    Phoebe Riddle, PT

## 2018-03-23 LAB
T4 FREE SERPL-MCNC: 0.98 NG/DL
TSH SERPL DL<=0.005 MIU/L-ACNC: 5.72 UIU/ML

## 2018-03-26 ENCOUNTER — OFFICE VISIT (OUTPATIENT)
Dept: RHEUMATOLOGY | Facility: CLINIC | Age: 38
End: 2018-03-26
Payer: COMMERCIAL

## 2018-03-26 VITALS
WEIGHT: 115.5 LBS | HEIGHT: 61 IN | HEART RATE: 61 BPM | DIASTOLIC BLOOD PRESSURE: 63 MMHG | SYSTOLIC BLOOD PRESSURE: 109 MMHG | BODY MASS INDEX: 21.81 KG/M2

## 2018-03-26 DIAGNOSIS — E06.3 AUTOIMMUNE THYROIDITIS: Primary | ICD-10-CM

## 2018-03-26 DIAGNOSIS — M53.3 SACRAL BACK PAIN: ICD-10-CM

## 2018-03-26 DIAGNOSIS — M54.50 LOW BACK PAIN WITHOUT SCIATICA, UNSPECIFIED BACK PAIN LATERALITY, UNSPECIFIED CHRONICITY: ICD-10-CM

## 2018-03-26 PROCEDURE — 99214 OFFICE O/P EST MOD 30 MIN: CPT | Mod: S$GLB,,, | Performed by: INTERNAL MEDICINE

## 2018-03-26 PROCEDURE — 99999 PR PBB SHADOW E&M-EST. PATIENT-LVL III: CPT | Mod: PBBFAC,,, | Performed by: INTERNAL MEDICINE

## 2018-03-26 RX ORDER — DOXYCYCLINE HYCLATE 100 MG
100 TABLET ORAL 2 TIMES DAILY
COMMUNITY
Start: 2018-03-13 | End: 2018-10-19

## 2018-03-26 RX ORDER — THYROID 60 MG/1
60 TABLET ORAL
Qty: 30 TABLET | Refills: 11 | Status: SHIPPED | OUTPATIENT
Start: 2018-03-26 | End: 2018-10-19

## 2018-03-26 NOTE — PROGRESS NOTES
"Subjective:       Patient ID: Juan Ramirez is a 37 y.o. female.    Chief Complaint: Disease Management; Pain; and Swelling    Follow up: hashmoto's thyroiditis and lumbar sacral pain  Doing fair.      Review of Systems   Constitutional: Negative for activity change, appetite change, chills, diaphoresis and unexpected weight change.   HENT: Negative for congestion, dental problem, ear discharge, ear pain, facial swelling, mouth sores, nosebleeds, postnasal drip, rhinorrhea, sinus pressure, sneezing, sore throat, tinnitus and voice change.    Eyes: Negative for photophobia, pain, discharge, redness and itching.   Respiratory: Negative for apnea, cough, chest tightness, shortness of breath and wheezing.    Cardiovascular: Negative for chest pain, palpitations and leg swelling.   Gastrointestinal: Negative for abdominal distention, abdominal pain, constipation, diarrhea, nausea and vomiting.   Endocrine: Negative for cold intolerance, heat intolerance, polydipsia and polyuria.   Genitourinary: Negative for decreased urine volume, difficulty urinating, flank pain, frequency, hematuria and urgency.   Musculoskeletal: Positive for back pain. Negative for arthralgias, gait problem, neck pain and neck stiffness.   Skin: Negative for pallor, rash and wound.   Allergic/Immunologic: Negative for immunocompromised state.   Neurological: Negative for dizziness, tremors, weakness and numbness.   Hematological: Negative for adenopathy. Does not bruise/bleed easily.   Psychiatric/Behavioral: Negative for sleep disturbance. The patient is not nervous/anxious.          Objective:   /63   Pulse 61   Ht 5' 1" (1.549 m)   Wt 52.4 kg (115 lb 8.3 oz)   BMI 21.83 kg/m²      Physical Exam   Vitals reviewed.  Constitutional: She is oriented to person, place, and time and well-developed, well-nourished, and in no distress.   HENT:   Head: Normocephalic and atraumatic.   Mouth/Throat: Oropharynx is clear and moist.   Eyes: EOM are " normal. Pupils are equal, round, and reactive to light.   Neck: Neck supple. No thyromegaly present.   Cardiovascular: Normal rate, regular rhythm and normal heart sounds.  Exam reveals no gallop and no friction rub.    No murmur heard.  Pulmonary/Chest: Breath sounds normal. She has no wheezes. She has no rales. She exhibits no tenderness.   Abdominal: There is no tenderness. There is no rebound and no guarding.       Right Side Rheumatological Exam     Examination finds the shoulder, elbow, wrist, knee, 1st PIP, 1st MCP, 2nd PIP, 2nd MCP, 3rd PIP, 3rd MCP, 4th PIP, 4th MCP, 5th PIP and 5th MCP normal.    Shoulder Exam   Tenderness Location: no tenderness    Range of Motion   Active Abduction: abnormal   Adduction: abnormal  Sensation: normal    Knee Exam   Patellofemoral Crepitus: negative  Effusion: negative  Sensation: normal    Hip Exam   Tenderness Location: no tenderness  Sensation: normal    Elbow/Wrist Exam   Tenderness Location: no tenderness  Sensation: normal    Left Side Rheumatological Exam     Examination finds the shoulder, elbow, wrist, knee, 1st PIP, 1st MCP, 2nd PIP, 2nd MCP, 3rd PIP, 3rd MCP, 4th PIP, 4th MCP, 5th PIP and 5th MCP normal.    Shoulder Exam   Tenderness Location: no tenderness    Range of Motion   Active Abduction: abnormal   Sensation: normal    Knee Exam     Patellofemoral Crepitus: negative  Effusion: negative  Sensation: normal    Hip Exam   Tenderness Location: no tenderness  Sensation: normal    Elbow/Wrist Exam   Sensation: normal      Back/Neck Exam   General Inspection   Gait: normal       Tenderness Right paramedian tenderness of the Lower L-Spine.Left paramedian tenderness of the Lower L-Spine.    Back Range of Motion   Lateral Bend Right: abnormal  Lateral Bend Left: abnormal  Rotation Right: abnormal  Rotation Left: abnormal      Lymphadenopathy:     She has no cervical adenopathy.   Neurological: She is alert and oriented to person, place, and time. Gait normal.    Skin: No rash noted. No erythema. No pallor.     Psychiatric: Mood and affect normal.   Musculoskeletal: She exhibits deformity. She exhibits no tenderness.           Results for orders placed or performed in visit on 03/22/18   TSH   Result Value Ref Range    TSH 5.724 (H) 0.400 - 4.000 uIU/mL   T4, free   Result Value Ref Range    Free T4 0.98 0.71 - 1.51 ng/dL       Assessment:       1. Autoimmune thyroiditis    2. Sacral back pain    3. Low back pain without sciatica, unspecified back pain laterality, unspecified chronicity            Plan:       Juan was seen today for disease management, pain and swelling.    Diagnoses and all orders for this visit:    Autoimmune thyroiditis  -     Ambulatory consult to Endocrinology  -     TSH; Future  -     Thyroid peroxidase antibody; Future  -     T4, free; Future  -     T3, free; Future  -     Anti-thyroglobulin antibody; Future  -     Thyroglobulin; Future    Sacral back pain    Low back pain without sciatica, unspecified back pain laterality, unspecified chronicity    Other orders  -     thyroid, pork, 60 mg Tab; Take 1 tablet (60 mg total) by mouth before breakfast.      Pt lives on the [facility] and would an endocrine physician over here, she has look at her insurance and we will try a local doc

## 2018-03-29 ENCOUNTER — CLINICAL SUPPORT (OUTPATIENT)
Dept: REHABILITATION | Facility: HOSPITAL | Age: 38
End: 2018-03-29
Attending: ORTHOPAEDIC SURGERY
Payer: COMMERCIAL

## 2018-03-29 DIAGNOSIS — R20.0 NUMBNESS OF LEFT FOOT: ICD-10-CM

## 2018-03-29 DIAGNOSIS — M54.42 CHRONIC BILATERAL LOW BACK PAIN WITH LEFT-SIDED SCIATICA: ICD-10-CM

## 2018-03-29 DIAGNOSIS — G89.29 CHRONIC BILATERAL LOW BACK PAIN WITH LEFT-SIDED SCIATICA: ICD-10-CM

## 2018-03-29 PROCEDURE — 97110 THERAPEUTIC EXERCISES: CPT | Mod: PO

## 2018-03-29 PROCEDURE — 97140 MANUAL THERAPY 1/> REGIONS: CPT | Mod: PO

## 2018-03-29 NOTE — PROGRESS NOTES
Physical Therapy Daily Note   Name: Juan Ramirez  Clinic Number: [id number]    Evaluation Date: 2/2/2018  Visit #: 6 / 20  Authorization period Expiration: 12/31/2018  Plan of Care Expiration: 4/5/2018  Precautions: standard    Time In: 8:20 AM  Time Out: 9:00 AM  Total 1:1 Treatment Time: 40 min      Diagnosis:   Encounter Diagnoses   Name Primary?    Chronic bilateral low back pain with left-sided sciatica     Numbness of left foot      Physician: Topher Hernandez MD  Treatment Orders: PT Eval and Treat    Subjective   Pt reports: RTW 4/2/2018 full-time and light duty.    Pain Scale:  4/10      Objective     Discussed Plan of Care with patient: Yes    Juan received 35 minutes of therapeutic exercise including:   UBE in standing FWD x 5 min  UT stretch 2 x 30 sec  scap retraction 10 x 5 sec  diaphragmatic breathing x 2 min  PPT x 10  supine marching x 10  bridge on red physioball x 10 with arms by side  Prone Y/T/I with thumb up x 5 each UE  Prone glute sets 10 x 5 sec  bird-dog x 10 alt LE (manually block LLE in stance)  Quadruped fire hydrants x 10 each LE  UE ball on wall x 10 CW/CCW  scap wall clock with yellow T-band x 4 each UE  Resisted lat walk with OHP with green T-band CLX x 20' each way  (NEXT VISIT: Pallof, Body Blade, side lying ER with retract)      Juan received 10 minutes of manual therapy including:   LLE long-axis distraction, brief, Grade 3  R sacral PA glides Grade 3  L shoulder AP mobs Grade 2  Scap mobs in side lying and controlled eccentric protraction  DID NOT PERFORM: MET R hip ext + L hip flex 5 x 10 sec and ADD/ABD lock-up 10 x 10 sec each    Written Home Exercises Provided: Patient educated to continue with previously issued HEP. Increased to add push-pull SI muscle energy technique.  Exercises were reviewed and Juan was able to demonstrate them prior to the end of the session. Pt received a written copy of exercises to perform at  home. Juan demonstrated good  understanding of the education provided.     Assessment   Pt with late arrival. Arrived with less pain and less pelvic obliquity due to compliance with HEP and improving core motor control. Ongoing shoulder pain and instability that will benefit from cont care.  Juan is progressing well towards her goals. Will benefit from con't skilled care.    Anticipated barriers to physical therapy: none  Pt's spiritual, cultural and educational needs considered and pt agreeable to plan of care and goals.    Plan   Continue with established Plan of Care towards Physical Therapy goals. Potential D/C next visit if tolerate RTW well.    Phoebe Riddle, PT

## 2018-04-05 ENCOUNTER — CLINICAL SUPPORT (OUTPATIENT)
Dept: REHABILITATION | Facility: HOSPITAL | Age: 38
End: 2018-04-05
Attending: ORTHOPAEDIC SURGERY
Payer: COMMERCIAL

## 2018-04-05 DIAGNOSIS — G89.29 CHRONIC BILATERAL LOW BACK PAIN WITH LEFT-SIDED SCIATICA: ICD-10-CM

## 2018-04-05 DIAGNOSIS — M54.42 CHRONIC BILATERAL LOW BACK PAIN WITH LEFT-SIDED SCIATICA: ICD-10-CM

## 2018-04-05 DIAGNOSIS — R20.0 NUMBNESS OF LEFT FOOT: ICD-10-CM

## 2018-04-05 PROCEDURE — 97110 THERAPEUTIC EXERCISES: CPT | Mod: PO

## 2018-04-05 PROCEDURE — 97140 MANUAL THERAPY 1/> REGIONS: CPT | Mod: PO

## 2018-04-05 NOTE — PROGRESS NOTES
"                                                    Physical Therapy Daily Note   Name: Juan Ramirez  Clinic Number: [id number]    Evaluation Date: 2/2/2018  Visit #: 7 / 20  Authorization period Expiration: 12/31/2018  Plan of Care Expiration: 4/5/2018  Precautions: standard    Time In: 8:00 AM  Time Out: 9:00 AM  Total 1:1 Treatment Time: 60 min      Diagnosis:   Encounter Diagnoses   Name Primary?    Chronic bilateral low back pain with left-sided sciatica     Numbness of left foot      Physician: Topher Hernandez MD  Treatment Orders: PT Eval and Treat    Subjective   Pt reports: RTW for 3 days and was very busy yesterday and original SX of pinching in L lumbar returned. Pain resolved this morning "but I haven't done anything today." Shoulder only hurts when leaning LUE on couch armrest >5 min.    Pain Scale:  4/10      Objective   In supine R ASIS significantly inferior to L. No significant LLD in supine or long sit    Discussed Plan of Care with patient: Yes    Juan received 35 minutes of therapeutic exercise including:   UBE in standing FWD x 5 min  UT stretch 2 x 30 sec  scap retraction 10 x 5 sec  diaphragmatic breathing x 2 min  PPT x 10  supine marching x 10 alternating and x 10 to 90-90  bridge on red physioball x 10 with arms by side  Prone Y/T/I with thumb up x 5 each UE  bird-dog x 10 alt LE (manually block LLE in stance)  Standing glute sets 10 x 5 sec  UE ball on wall x 10 CW/CCW  scap wall clock with yellow T-band x 4 each UE  Resisted lat walk with OHP with green T-band CLX x 20' each way  Body Blade x 15 sec each: BUE sup/inf at 90, BUE protract at 90, BUE sup/inf in supported V sit, R and LUE horiz ABD in quadruped  Side lying LUE ER with retract x 10, 1#      Juan received 25 minutes of manual therapy including:   MET R hip ext + L hip flex 5 x 10 sec and ADD/ABD lock-up 10 x 10 sec each  LLE long-axis distraction, brief, Grade 4  L shoulder AP mobs Grade 2  L lumbar rot mob in side lying " Grade 4  R sacral PA glides Grade 3  Scap mobs in side lying and controlled eccentric protraction    Written Home Exercises Provided: Patient educated to continue with previously issued HEP. Increased to add push-pull SI muscle energy technique.  Exercises were reviewed and Juan was able to demonstrate them prior to the end of the session. Pt received a written copy of exercises to perform at home. Juan demonstrated good  understanding of the education provided.     Assessment   Good tolerance to all treatment today with some impaired motor control and fatigue but no pain. Anticipate pain provocation at work due to poor core recruitment and impaired endurance, especially when attention is focused on work tasks.  Juan is progressing well towards her goals. Will benefit from con't skilled care.    Anticipated barriers to physical therapy: none  Pt's spiritual, cultural and educational needs considered and pt agreeable to plan of care and goals.    Plan   Continue with established Plan of Care towards Physical Therapy goals. Next check back in 1-2 weeks and plan for D/C.    Phoebe Riddle, PT

## 2018-05-08 ENCOUNTER — PATIENT MESSAGE (OUTPATIENT)
Dept: SURGERY | Facility: CLINIC | Age: 38
End: 2018-05-08

## 2018-05-10 ENCOUNTER — DOCUMENTATION ONLY (OUTPATIENT)
Dept: REHABILITATION | Facility: HOSPITAL | Age: 38
End: 2018-05-10

## 2018-05-10 DIAGNOSIS — R20.0 NUMBNESS OF LEFT FOOT: ICD-10-CM

## 2018-05-10 DIAGNOSIS — G89.29 CHRONIC BILATERAL LOW BACK PAIN WITH LEFT-SIDED SCIATICA: ICD-10-CM

## 2018-05-10 DIAGNOSIS — M54.42 CHRONIC BILATERAL LOW BACK PAIN WITH LEFT-SIDED SCIATICA: ICD-10-CM

## 2018-05-10 NOTE — PROGRESS NOTES
Patient was seen for 7 outpatient PT visits from 2/2/2018 to 4/5/2018. Treatment included: evaluation, HEP, pt education, manual therapy, ther ex, and NMR. Pt has met some goals. Continue HEP and Pt to follow-up with MD as planned. This patient is discharged from outpatient PT Services.      Short Term GOALS:  In 4 weeks, pt. will:  - stand with appropriate posture with min cueing and no increase in pain for 10 min - MET 3/16/18  - lift 10 # floor to waist with proper technique with min pain with min cueing - MET 3/16/18  - have less pain with resisted scaption with less scapular winging - NOT MET  - increase UE MMT by 1/2 grade without pain - MET 3/16/18  - improve functional leg length discrepancy and rotated pelvis with no pain with manual therapy - NOT MET     Long Term GOALS:  In 8 weeks, pt. will:  - be independent and compliant with HEP and SX management - MET 4/5/18  - return to work full-time light duty without SX increase - NOT MET  - stand with appropriate posture with no cueing and no increase in pain for 60 min - MET 4/5/18  - lift 30 # floor to waist with proper technique with min pain with min cueing - NOT MET  - increase BUE MMT by 1 grade without pain - MET 4/5/18

## 2018-05-21 ENCOUNTER — LAB VISIT (OUTPATIENT)
Dept: LAB | Facility: HOSPITAL | Age: 38
End: 2018-05-21
Attending: INTERNAL MEDICINE
Payer: COMMERCIAL

## 2018-05-21 DIAGNOSIS — E06.3 AUTOIMMUNE THYROIDITIS: ICD-10-CM

## 2018-05-21 LAB
T3FREE SERPL-MCNC: 2.5 PG/ML
T4 FREE SERPL-MCNC: 0.79 NG/DL
THYROGLOB AB SERPL IA-ACNC: <4 IU/ML
THYROPEROXIDASE IGG SERPL-ACNC: 72.1 IU/ML
TSH SERPL DL<=0.005 MIU/L-ACNC: 0.95 UIU/ML

## 2018-05-21 PROCEDURE — 84432 ASSAY OF THYROGLOBULIN: CPT

## 2018-05-21 PROCEDURE — 86800 THYROGLOBULIN ANTIBODY: CPT | Mod: 91

## 2018-05-21 PROCEDURE — 84481 FREE ASSAY (FT-3): CPT

## 2018-05-21 PROCEDURE — 84443 ASSAY THYROID STIM HORMONE: CPT

## 2018-05-21 PROCEDURE — 84439 ASSAY OF FREE THYROXINE: CPT

## 2018-05-21 PROCEDURE — 86376 MICROSOMAL ANTIBODY EACH: CPT

## 2018-05-23 LAB
THRYOGLOBULIN INTERPRETATION: ABNORMAL
THYROGLOB AB SERPL-ACNC: <1.8 IU/ML
THYROGLOB SERPL-MCNC: 6.7 NG/ML

## 2018-08-07 ENCOUNTER — TELEPHONE (OUTPATIENT)
Dept: OBSTETRICS AND GYNECOLOGY | Facility: CLINIC | Age: 38
End: 2018-08-07

## 2018-08-07 NOTE — TELEPHONE ENCOUNTER
Pt states had period that started Sunday, 8/5/18 but it was very light only lasted 2 days. States this is not her typical cycle. States periods coming monthly and does think last month it was lighter than usual also. States took UPT = Negative. Discussed Dr Glenn LR not doing private OB any longer, pt would need to see another provider if + pregnancy. Can do blood test or she can wait and repeat UPT in one week. Pt states will wait

## 2018-08-07 NOTE — TELEPHONE ENCOUNTER
----- Message from Christy Taylor sent at 8/7/2018 10:57 AM CDT -----  Contact: self  [phone]  Pt needing a call back, she is wanting lab orders for a blood pregnancy test, she can be reached at [phone].

## 2018-09-24 ENCOUNTER — TELEPHONE (OUTPATIENT)
Dept: RHEUMATOLOGY | Facility: CLINIC | Age: 38
End: 2018-09-24

## 2018-09-24 NOTE — TELEPHONE ENCOUNTER
----- Message from Olivia Flores sent at 9/24/2018 11:27 AM CDT -----  Contact: patient  Patient would like orders for blood work. Please call [phone]

## 2018-09-24 NOTE — TELEPHONE ENCOUNTER
Pt last seen 3/26/2018, last labs 5/21/2018. Please advise if pt needs labs at this time. No follow up appt scheduled at his time.

## 2018-09-25 NOTE — TELEPHONE ENCOUNTER
Pt is requesting full lab run and is stating that her hair is falling out in clumps for the past week. Pt is very nervous. She even wants labs for selenium etc to make sure she has enough for her thyroid. please advise.

## 2018-09-25 NOTE — TELEPHONE ENCOUNTER
----- Message from Bola Grace sent at 9/25/2018 11:14 AM CDT -----  Contact: pt  Type: Needs Medical Advice    Who Called:  pt  Symptoms (please be specific):  Hair loss, not sleeping well  How long has patient had these symptoms:  2-3 weeks  Pharmacy name and phone #:    Best Call Back Number:   Additional Information: pt is requesting a phone call to discuss

## 2018-09-28 NOTE — TELEPHONE ENCOUNTER
Left message that have list of labs that Mariluz advised to order. Need to know where to have done.

## 2018-09-28 NOTE — TELEPHONE ENCOUNTER
----- Message from Yakelin Quiroga sent at 9/28/2018 11:30 AM CDT -----  Type: Needs Medical Advice    Who Called:  Patient    Best Call Back Number: [phone] (home)     Additional Information: Patient requesting labs to be completed today, spoke to a nurse couple days ago, no update.

## 2018-10-16 ENCOUNTER — TELEPHONE (OUTPATIENT)
Dept: RHEUMATOLOGY | Facility: CLINIC | Age: 38
End: 2018-10-16

## 2018-10-16 DIAGNOSIS — E03.8 HYPOTHYROIDISM DUE TO HASHIMOTO'S THYROIDITIS: Primary | ICD-10-CM

## 2018-10-16 DIAGNOSIS — L65.9 HAIR LOSS: ICD-10-CM

## 2018-10-16 DIAGNOSIS — E06.3 HYPOTHYROIDISM DUE TO HASHIMOTO'S THYROIDITIS: Primary | ICD-10-CM

## 2018-10-16 NOTE — TELEPHONE ENCOUNTER
Patient stated since last lab results has changed thyroid medication . Wanting to have levels checked again. Informed office that hair failing out again, developing small clusters of rashes on several different locations on body. Orders in system patient will have them done when she can

## 2018-10-16 NOTE — TELEPHONE ENCOUNTER
Attempted to return patient call regarding situation. Left message to contact office. In message patient stated has left several messages for office. The call from yesterday is the only incoming message since appointment in March.

## 2018-10-16 NOTE — TELEPHONE ENCOUNTER
----- Message from Vidya Patel sent at 10/15/2018  1:54 PM CDT -----  Contact: Juan  Type: Needs Medical Advice    Who Called:  patient  Best Call Back Number: [phone]  Additional Information:  [facility] has been calling to get an answer on situation. [facility] was told would get with Dr Mcgraw and call back but has not received a call as of yet. Thanks!

## 2018-10-18 ENCOUNTER — LAB VISIT (OUTPATIENT)
Dept: LAB | Facility: HOSPITAL | Age: 38
End: 2018-10-18
Attending: INTERNAL MEDICINE
Payer: COMMERCIAL

## 2018-10-18 DIAGNOSIS — L65.9 HAIR LOSS: ICD-10-CM

## 2018-10-18 DIAGNOSIS — E03.8 HYPOTHYROIDISM DUE TO HASHIMOTO'S THYROIDITIS: ICD-10-CM

## 2018-10-18 DIAGNOSIS — E06.3 HYPOTHYROIDISM DUE TO HASHIMOTO'S THYROIDITIS: ICD-10-CM

## 2018-10-18 LAB
T3FREE SERPL-MCNC: 2.3 PG/ML
T4 FREE SERPL-MCNC: 0.8 NG/DL
THYROGLOB AB SERPL IA-ACNC: <4 IU/ML
THYROPEROXIDASE IGG SERPL-ACNC: 130.7 IU/ML
TSH SERPL DL<=0.005 MIU/L-ACNC: 0.38 UIU/ML

## 2018-10-18 PROCEDURE — 83018 HEAVY METAL QUAN EACH NES: CPT

## 2018-10-18 PROCEDURE — 84481 FREE ASSAY (FT-3): CPT

## 2018-10-18 PROCEDURE — 86800 THYROGLOBULIN ANTIBODY: CPT | Mod: 91

## 2018-10-18 PROCEDURE — 86376 MICROSOMAL ANTIBODY EACH: CPT

## 2018-10-18 PROCEDURE — 86800 THYROGLOBULIN ANTIBODY: CPT

## 2018-10-18 PROCEDURE — 84443 ASSAY THYROID STIM HORMONE: CPT

## 2018-10-18 PROCEDURE — 84439 ASSAY OF FREE THYROXINE: CPT

## 2018-10-18 PROCEDURE — 36415 COLL VENOUS BLD VENIPUNCTURE: CPT | Mod: PO

## 2018-10-19 ENCOUNTER — LAB VISIT (OUTPATIENT)
Dept: LAB | Facility: OTHER | Age: 38
End: 2018-10-19
Payer: COMMERCIAL

## 2018-10-19 ENCOUNTER — OFFICE VISIT (OUTPATIENT)
Dept: OBSTETRICS AND GYNECOLOGY | Facility: CLINIC | Age: 38
End: 2018-10-19
Payer: COMMERCIAL

## 2018-10-19 VITALS
DIASTOLIC BLOOD PRESSURE: 72 MMHG | WEIGHT: 114.63 LBS | BODY MASS INDEX: 21.64 KG/M2 | HEIGHT: 61 IN | SYSTOLIC BLOOD PRESSURE: 90 MMHG

## 2018-10-19 DIAGNOSIS — Z11.3 SCREEN FOR STD (SEXUALLY TRANSMITTED DISEASE): ICD-10-CM

## 2018-10-19 DIAGNOSIS — Z01.419 WOMEN'S ANNUAL ROUTINE GYNECOLOGICAL EXAMINATION: Primary | ICD-10-CM

## 2018-10-19 DIAGNOSIS — Z12.31 ENCOUNTER FOR SCREENING MAMMOGRAM FOR BREAST CANCER: ICD-10-CM

## 2018-10-19 DIAGNOSIS — R10.2 PELVIC PAIN IN FEMALE: ICD-10-CM

## 2018-10-19 DIAGNOSIS — Z12.4 PAP SMEAR FOR CERVICAL CANCER SCREENING: ICD-10-CM

## 2018-10-19 DIAGNOSIS — Z11.51 SCREENING FOR HPV (HUMAN PAPILLOMAVIRUS): ICD-10-CM

## 2018-10-19 DIAGNOSIS — Z80.3 FAMILY HISTORY OF BREAST CANCER IN FIRST DEGREE RELATIVE: ICD-10-CM

## 2018-10-19 LAB
B-HCG UR QL: NEGATIVE
C TRACH DNA SPEC QL NAA+PROBE: NOT DETECTED
CANDIDA RRNA VAG QL PROBE: NEGATIVE
CTP QC/QA: YES
G VAGINALIS RRNA GENITAL QL PROBE: NEGATIVE
HAV IGM SERPL QL IA: NEGATIVE
HBV CORE IGM SERPL QL IA: NEGATIVE
HBV SURFACE AG SERPL QL IA: NEGATIVE
HCV AB SERPL QL IA: NEGATIVE
HIV 1+2 AB+HIV1 P24 AG SERPL QL IA: NEGATIVE
N GONORRHOEA DNA SPEC QL NAA+PROBE: NOT DETECTED
T VAGINALIS RRNA GENITAL QL PROBE: NEGATIVE

## 2018-10-19 PROCEDURE — 88175 CYTOPATH C/V AUTO FLUID REDO: CPT

## 2018-10-19 PROCEDURE — 86592 SYPHILIS TEST NON-TREP QUAL: CPT

## 2018-10-19 PROCEDURE — 87086 URINE CULTURE/COLONY COUNT: CPT

## 2018-10-19 PROCEDURE — 86703 HIV-1/HIV-2 1 RESULT ANTBDY: CPT

## 2018-10-19 PROCEDURE — 87660 TRICHOMONAS VAGIN DIR PROBE: CPT

## 2018-10-19 PROCEDURE — 99395 PREV VISIT EST AGE 18-39: CPT | Mod: S$GLB,,, | Performed by: NURSE PRACTITIONER

## 2018-10-19 PROCEDURE — 87624 HPV HI-RISK TYP POOLED RSLT: CPT

## 2018-10-19 PROCEDURE — 99999 PR PBB SHADOW E&M-EST. PATIENT-LVL III: CPT | Mod: PBBFAC,,, | Performed by: NURSE PRACTITIONER

## 2018-10-19 PROCEDURE — 36415 COLL VENOUS BLD VENIPUNCTURE: CPT

## 2018-10-19 PROCEDURE — 80074 ACUTE HEPATITIS PANEL: CPT

## 2018-10-19 PROCEDURE — 87491 CHLMYD TRACH DNA AMP PROBE: CPT

## 2018-10-19 PROCEDURE — 81025 URINE PREGNANCY TEST: CPT | Mod: S$GLB,,, | Performed by: NURSE PRACTITIONER

## 2018-10-19 NOTE — PROGRESS NOTES
CC: Annual  HPI: Pt is a 37 y.o.  female who presents for routine annual exam. She is not using any contraception. She does want STD screening- full panel.  Pt is c/o intermittent pelvic pain. The pain is described as sharp and shooting, and is 5/10 in intensity. Pain is located in the RLQ and LLQ (more on the left) area without radiation. Onset was sudden occurring 1 year ago. Symptoms have been stable since. Aggravating factors: none. Alleviating factors: none. Associated symptoms: irritation with urination. The patient denies constipation, diarrhea, dysuria, fever, nausea and vomiting. Risk factors for pelvic/abdominal pain include prior ectopic pregnancy. The patient participates in regular exercise: yes.  The patient does not smoke.  The patient wears seatbelts.   Pt denies any domestic violence.  Reports + family history of breast cancer- mother  (age 46 at diagnosis) and paternal grandmother.  Reports h/o abnormal pap with LEEP in 2006- all pap WNL since.           FH:  Breast cancer: mother and paternal grandmother  Colon cancer: none  Ovarian cancer: none  Endometrial cancer: none    ROS:  GENERAL: Feeling well overall. Denies fever or chills.   SKIN: Denies rash or lesions.   HEAD: Denies head injury or headache.   NODES: Denies enlarged lymph nodes.   CHEST: Denies chest pain or shortness of breath.   CARDIOVASCULAR: Denies palpitations or left sided chest pain.   ABDOMEN: No abdominal pain, constipation, diarrhea, nausea, vomiting or rectal bleeding.   URINARY: No dysuria, hematuria, or burning on urination.  REPRODUCTIVE: See HPI.   BREASTS: Denies pain, lumps, or nipple discharge.   HEMATOLOGIC: No easy bruisability or excessive bleeding.   MUSCULOSKELETAL: Denies joint pain or swelling.   NEUROLOGIC: Denies syncope or weakness.   PSYCHIATRIC: Denies depression, anxiety or mood swings.    PE:   APPEARANCE: Well nourished, well developed, White female in no acute distress.  NODES: no cervical,  supraclavicular, or inguinal lymphadenopathy  BREASTS: Symmetrical, no skin changes or visible lesions. No palpable masses, nipple discharge or adenopathy bilaterally.  ABDOMEN: Soft. No tenderness or masses. No distention. No hernias palpated. No CVA tenderness.  VULVA: No lesions. Normal external female genitalia.  URETHRAL MEATUS: Normal size and location, no lesions, no prolapse.  URETHRA: No masses, tenderness, or prolapse.  VAGINA: Moist. No lesions or lacerations noted. No abnormal discharge present. No odor present.   CERVIX: No lesions or discharge. No cervical motion tenderness.   UTERUS: Normal size, regular shape, mobile, non-tender.  ADNEXA: No tenderness. No fullness or masses palpated in the adnexal regions.   ANUS PERINEUM: Normal.      Diagnosis:  1. Women's annual routine gynecological examination    2. Screening for HPV (human papillomavirus)    3. Encounter for screening mammogram for breast cancer    4. Family history of breast cancer in first degree relative    5. Screen for STD (sexually transmitted disease)    6. Pelvic pain in female    7. Pap smear for cervical cancer screening        Plan:   Pap/ HPV  mammo- discussed will need annual mammograms starting now d/t mother's premenopausal diagnosis  Discussed referral to Dr. Gardner for genetic consult in [facility]. Declines at present  Pelvic US for evaluation of pelvic pain  Urine culture  UPT is negative      Orders Placed This Encounter    HPV High Risk Genotypes, PCR    C. trachomatis/N. gonorrhoeae by AMP DNA    Vaginosis Screen by DNA Probe    Urine culture    Mammo Digital Screening Bilat w/ Aime    HIV-1 and HIV-2 antibodies    RPR    Hepatitis panel, acute    Liquid-based pap smear, screening       Patient was counseled today on the new ACS guidelines for cervical cytology screening as well as the current recommendations for breast cancer screening. She was counseled to follow up with her PCP for  other routine health maintenance. Counseling session lasted approximately 10 minutes, and all her questions were answered.    Follow-up with me in 1 year for routine exam

## 2018-10-20 LAB — BACTERIA UR CULT: NORMAL

## 2018-10-22 LAB
IODINE SERPL-MCNC: 45 NG/ML (ref 40–92)
RPR SER QL: NORMAL
THRYOGLOBULIN INTERPRETATION: ABNORMAL
THYROGLOB AB SERPL-ACNC: <1.8 IU/ML
THYROGLOB SERPL-MCNC: 13 NG/ML

## 2018-10-24 ENCOUNTER — HOSPITAL ENCOUNTER (OUTPATIENT)
Dept: RADIOLOGY | Facility: HOSPITAL | Age: 38
Discharge: HOME | End: 2018-10-24
Attending: NURSE PRACTITIONER
Payer: COMMERCIAL

## 2018-10-24 ENCOUNTER — TELEPHONE (OUTPATIENT)
Dept: OBSTETRICS AND GYNECOLOGY | Facility: CLINIC | Age: 38
End: 2018-10-24

## 2018-10-24 DIAGNOSIS — Z12.31 ENCOUNTER FOR SCREENING MAMMOGRAM FOR BREAST CANCER: ICD-10-CM

## 2018-10-24 DIAGNOSIS — Z80.3 FAMILY HISTORY OF BREAST CANCER IN FIRST DEGREE RELATIVE: ICD-10-CM

## 2018-10-24 DIAGNOSIS — R10.2 PELVIC PAIN IN FEMALE: ICD-10-CM

## 2018-10-24 PROCEDURE — 77063 BREAST TOMOSYNTHESIS BI: CPT | Mod: TC,PO

## 2018-10-24 PROCEDURE — 76856 US EXAM PELVIC COMPLETE: CPT | Mod: TC,PO

## 2018-10-24 PROCEDURE — 77067 SCR MAMMO BI INCL CAD: CPT | Mod: 26,,, | Performed by: RADIOLOGY

## 2018-10-24 PROCEDURE — 76830 TRANSVAGINAL US NON-OB: CPT | Mod: 26,,, | Performed by: RADIOLOGY

## 2018-10-24 PROCEDURE — 77063 BREAST TOMOSYNTHESIS BI: CPT | Mod: 26,,, | Performed by: RADIOLOGY

## 2018-10-24 PROCEDURE — 76856 US EXAM PELVIC COMPLETE: CPT | Mod: 26,,, | Performed by: RADIOLOGY

## 2018-10-29 LAB
HPV HR 12 DNA CVX QL NAA+PROBE: NEGATIVE
HPV16 AG SPEC QL: NEGATIVE
HPV18 DNA SPEC QL NAA+PROBE: NEGATIVE

## 2018-10-31 ENCOUNTER — TELEPHONE (OUTPATIENT)
Dept: OBSTETRICS AND GYNECOLOGY | Facility: CLINIC | Age: 38
End: 2018-10-31

## 2018-10-31 DIAGNOSIS — Z91.89 AT HIGH RISK FOR BREAST CANCER: Primary | ICD-10-CM

## 2018-10-31 NOTE — TELEPHONE ENCOUNTER
----- Message from Giuliana Gaming MA sent at 10/29/2018  8:10 AM CDT -----         Please call pt and scheduled for a consult with Dr. Vanessa for pelvic pain.     Her pelvic US revealed multiple prominent vessels within the uterus and the ovary- this can be seen with pelvic congestion type syndrome and suspected  Adenomyosis   ccurs when the tissue that normally lines the uterus grows into the muscular wall of the uterus.  Dr. Vanessa is our pelvic pain specialist.  (Routing comment)

## 2018-10-31 NOTE — TELEPHONE ENCOUNTER
Spoke with pt:    Pt was scheduled to see Dr Vanessa for the following     Her pelvic US revealed multiple prominent vessels within the uterus and the ovary- this can be seen with pelvic congestion type syndrome and suspected  Adenomyosis   ccurs when the tissue that normally lines the uterus grows into the muscular wall of the uterus.     Pt was given above message and scheduled for her follow up with Dr Vanessa.    Pt verbalized understanding/

## 2018-11-02 ENCOUNTER — PATIENT MESSAGE (OUTPATIENT)
Dept: OBSTETRICS AND GYNECOLOGY | Facility: CLINIC | Age: 38
End: 2018-11-02

## 2018-11-15 ENCOUNTER — OFFICE VISIT (OUTPATIENT)
Dept: OBSTETRICS AND GYNECOLOGY | Facility: CLINIC | Age: 38
End: 2018-11-15
Payer: COMMERCIAL

## 2018-11-15 VITALS
DIASTOLIC BLOOD PRESSURE: 60 MMHG | BODY MASS INDEX: 21.36 KG/M2 | HEIGHT: 61 IN | WEIGHT: 113.13 LBS | SYSTOLIC BLOOD PRESSURE: 100 MMHG

## 2018-11-15 DIAGNOSIS — N94.0 MITTELSCHMERZ: Primary | ICD-10-CM

## 2018-11-15 PROCEDURE — 99244 OFF/OP CNSLTJ NEW/EST MOD 40: CPT | Mod: S$GLB,,, | Performed by: OBSTETRICS & GYNECOLOGY

## 2018-11-15 PROCEDURE — 99999 PR PBB SHADOW E&M-EST. PATIENT-LVL II: CPT | Mod: PBBFAC,,, | Performed by: OBSTETRICS & GYNECOLOGY

## 2018-11-15 RX ORDER — MELOXICAM 15 MG/1
15 TABLET ORAL DAILY
Qty: 30 TABLET | Refills: 2 | Status: SHIPPED | OUTPATIENT
Start: 2018-11-15 | End: 2019-01-30

## 2018-11-15 RX ORDER — SULFAMETHOXAZOLE AND TRIMETHOPRIM 800; 160 MG/1; MG/1
TABLET ORAL
Refills: 9 | COMMUNITY
Start: 2018-11-07 | End: 2018-11-19

## 2018-11-15 NOTE — PROGRESS NOTES
Subjective:       Patient ID: Juan Ramirez is a 37 y.o. female.    Chief Complaint:  Pelvic Pain (ultra sound follow up)      History of Present Illness  HPI  Pt is 37 y.o. here in consultation for pelvic pain.  Was seen by our NP and had a recent ultrasound performed.  Pt notes that her pain is not too often.  L>R.  But seems to have pain around the time of ovulation.  Never had pain like this before.  Worse over last 2 years.  Not every month.  Quick and sharp.  No pain with intercourse.  No pain with exercise.    No gi/gu sx.    Reviewed u/s:  Impression       1. Multiple prominent vessels are noted within the uterus and the adnexa as can be seen with pelvic congestion type syndrome  2. Adenomyosis is suspected with a heterogeneous myometrium  3. Some free pelvic fluid is noted which is common in premenopausal patients but can be seen with inflammatory change under the proper clinical scenario.         GYN & OB History  Patient's last menstrual period was 10/22/2018 (exact date).   Date of Last Pap: 10/25/2018    OB History    Para Term  AB Living   7 6 3   1 3   SAB TAB Ectopic Multiple Live Births       1          # Outcome Date GA Lbr Christiano/2nd Weight Sex Delivery Anes PTL Lv   7 Term            6 Term            5 Term            4 Ectopic            3 Para      Vag-Spont      2 Para      Vag-Spont      1 Para      Vag-Spont             Review of Systems  Review of Systems   Constitutional: Negative for activity change, appetite change and fatigue.   HENT: Negative.  Negative for tinnitus.    Eyes: Negative.    Respiratory: Negative for cough and shortness of breath.    Cardiovascular: Negative for chest pain and palpitations.   Gastrointestinal: Negative.  Negative for abdominal pain, blood in stool, constipation, diarrhea and nausea.   Endocrine: Negative.  Negative for hot flashes.   Genitourinary: Positive for pelvic pain. Negative for dyspareunia, dysuria, frequency, menstrual problem,  vaginal discharge, dysmenorrhea, urinary incontinence and postcoital bleeding.   Musculoskeletal: Negative for back pain and joint swelling.   Neurological: Negative.  Negative for headaches.   Hematological: Negative.  Does not bruise/bleed easily.   Psychiatric/Behavioral: The patient is not nervous/anxious.    Breast: negative.  Negative for breast mass, nipple discharge and skin changes          Objective:    Physical Exam:   Constitutional: She is oriented to person, place, and time. She appears well-developed and well-nourished. No distress.    HENT:   Head: Normocephalic and atraumatic.    Eyes: Conjunctivae and lids are normal. Pupils are equal, round, and reactive to light.    Neck: Normal range of motion and full passive range of motion without pain. Neck supple.    Cardiovascular: Normal rate and regular rhythm.  Exam reveals no edema.     Pulmonary/Chest: Effort normal and breath sounds normal. She has no wheezes.        Abdominal: Soft. Normal appearance and bowel sounds are normal. She exhibits no distension. There is no tenderness. There is no rebound and no guarding.     Genitourinary: Vagina normal and uterus normal. Pelvic exam was performed with patient supine. There is no tenderness or lesion on the right labia. There is no tenderness or lesion on the left labia. Uterus is not deviated, not fixed and not hosting fibroids. Cervix is normal. Right adnexum displays no mass and no tenderness. Left adnexum displays no mass and no tenderness. No rectocele, cystocele or unspecified prolapse of vaginal walls in the vagina. No vaginal discharge found. Labial bartholins normal.Cervix exhibits no motion tenderness and no discharge.           Musculoskeletal: Normal range of motion and moves all extremeties.       Neurological: She is alert and oriented to person, place, and time. She has normal strength.    Skin: Skin is warm and dry.    Psychiatric: She has a normal mood and affect. Her speech is normal  and behavior is normal. Thought content normal. Her mood appears not anxious. She does not exhibit a depressed mood. She expresses no suicidal plans and no homicidal plans.          Assessment:        1. Mg                Plan:      Juan was seen today for pelvic pain.    Diagnoses and all orders for this visit:    Mittekileymerz  -     meloxicam (MOBIC) 15 MG tablet; Take 1 tablet (15 mg total) by mouth once daily.     We spent approximately 35 min discussing the findings on her ultrasound.  We discussed adenomyosis and how this is treated with high-dose progesterone.  Since the pain is not significant, we would start with anti-inflammatories to see if we can help with the ovulatory pain. Next step would be consideration of daily progesterone.  All questions were answered.  Patient can follow up if the pain returns.  Or for her well-woman exam.

## 2018-11-16 ENCOUNTER — PATIENT MESSAGE (OUTPATIENT)
Dept: RHEUMATOLOGY | Facility: CLINIC | Age: 38
End: 2018-11-16

## 2018-11-16 DIAGNOSIS — E03.8 HYPOTHYROIDISM DUE TO HASHIMOTO'S THYROIDITIS: ICD-10-CM

## 2018-11-16 DIAGNOSIS — E04.1 THYROID NODULE: Primary | ICD-10-CM

## 2018-11-16 DIAGNOSIS — E06.3 HYPOTHYROIDISM DUE TO HASHIMOTO'S THYROIDITIS: ICD-10-CM

## 2018-11-19 ENCOUNTER — PATIENT MESSAGE (OUTPATIENT)
Dept: RHEUMATOLOGY | Facility: CLINIC | Age: 38
End: 2018-11-19

## 2018-11-19 DIAGNOSIS — E03.8 HYPOTHYROIDISM DUE TO HASHIMOTO'S THYROIDITIS: ICD-10-CM

## 2018-11-19 DIAGNOSIS — E06.3 HYPOTHYROIDISM DUE TO HASHIMOTO'S THYROIDITIS: ICD-10-CM

## 2018-11-19 DIAGNOSIS — E04.1 THYROID NODULE: ICD-10-CM

## 2018-11-19 RX ORDER — LEVOTHYROXINE SODIUM 50 UG/1
1 CAPSULE ORAL
Qty: 30 CAPSULE | Refills: 6 | Status: SHIPPED | OUTPATIENT
Start: 2018-11-19 | End: 2018-11-26 | Stop reason: SDUPTHER

## 2018-11-21 ENCOUNTER — PATIENT MESSAGE (OUTPATIENT)
Dept: RHEUMATOLOGY | Facility: CLINIC | Age: 38
End: 2018-11-21

## 2018-11-21 NOTE — TELEPHONE ENCOUNTER
Insurance requiring a letter specifying the reason patient can only take brand name thyroid medication.

## 2018-11-26 RX ORDER — LEVOTHYROXINE SODIUM 50 UG/1
1 CAPSULE ORAL
Qty: 30 CAPSULE | Refills: 12 | Status: SHIPPED | OUTPATIENT
Start: 2018-11-26 | End: 2019-02-01 | Stop reason: SDUPTHER

## 2018-12-03 ENCOUNTER — PATIENT MESSAGE (OUTPATIENT)
Dept: RHEUMATOLOGY | Facility: CLINIC | Age: 38
End: 2018-12-03

## 2018-12-04 ENCOUNTER — PATIENT MESSAGE (OUTPATIENT)
Dept: RHEUMATOLOGY | Facility: CLINIC | Age: 38
End: 2018-12-04

## 2018-12-04 NOTE — TELEPHONE ENCOUNTER
Spoke with pharmacy. Pt prescribed Tirosint, but not covered by insurance. Working on PA, needs letter of necessity.   Pt had reaction to levothyroxine, but has taken Synthroid in the past.   Called in 3 months of 50mcg synthroid and advised pt that sent to pharmacy.

## 2018-12-22 ENCOUNTER — TELEPHONE (OUTPATIENT)
Dept: PHARMACY | Facility: CLINIC | Age: 38
End: 2018-12-22

## 2018-12-22 NOTE — TELEPHONE ENCOUNTER
Called and spoke with patient notifying her Tirosint PA has been submitted to her insurance on 12/22/18 @ 12:17pm.      Patient stated she has never taken the Tirosint prior. On Synthroid x 5 years, caused her to not sleep well and have rashes.  Due to rashes she was tested for Lupus, negative, and had allergy panel completed, negative.  Patient said she was switched to [place], rashes went away, however hair started falling out and she doesn't feel her levels are where they need to be and she wants to give Tirosint a try after researching.    I explained to patient PA can take up to 72 hours for a decision and I will notify both her and Dr. Mcgraw's office upon approval / denial.    Pt verbalized understanding.    Shilpa Martinez  [phone] ext [phone]

## 2018-12-27 ENCOUNTER — TELEPHONE (OUTPATIENT)
Dept: PHARMACY | Facility: CLINIC | Age: 38
End: 2018-12-27

## 2018-12-27 NOTE — TELEPHONE ENCOUNTER
Reason for call:    Notify patient PA approved for Tirosint Capsules resulting in a copayment of $50.00.  Copayment card applied reducing cost to $25.00    Will continue to reach out to patient.    PA Information:  [facility]  [phone]  PA Case ID # [id number]  PA Approval Dates: 12/26/18-12/26/2020  PA Approved for Tirosint 50mcg #28 capsules per 28 days    Thanks,   Shilpa Martinez CPhT, B.A  Patient Care Advocate   [facility]  Phone: [phone] Ext [phone]  Fax: [phone]

## 2019-01-30 ENCOUNTER — OFFICE VISIT (OUTPATIENT)
Dept: OBSTETRICS AND GYNECOLOGY | Facility: CLINIC | Age: 39
End: 2019-01-30
Payer: COMMERCIAL

## 2019-01-30 VITALS
DIASTOLIC BLOOD PRESSURE: 62 MMHG | BODY MASS INDEX: 21.36 KG/M2 | HEIGHT: 61 IN | SYSTOLIC BLOOD PRESSURE: 90 MMHG | WEIGHT: 113.13 LBS

## 2019-01-30 DIAGNOSIS — N76.0 ACUTE VAGINITIS: Primary | ICD-10-CM

## 2019-01-30 LAB
CANDIDA RRNA VAG QL PROBE: POSITIVE
G VAGINALIS RRNA GENITAL QL PROBE: NEGATIVE
T VAGINALIS RRNA GENITAL QL PROBE: NEGATIVE

## 2019-01-30 PROCEDURE — 99213 PR OFFICE/OUTPT VISIT, EST, LEVL III, 20-29 MIN: ICD-10-PCS | Mod: S$GLB,,, | Performed by: NURSE PRACTITIONER

## 2019-01-30 PROCEDURE — 87510 GARDNER VAG DNA DIR PROBE: CPT

## 2019-01-30 PROCEDURE — 99213 OFFICE O/P EST LOW 20 MIN: CPT | Mod: S$GLB,,, | Performed by: NURSE PRACTITIONER

## 2019-01-30 PROCEDURE — 3008F PR BODY MASS INDEX (BMI) DOCUMENTED: ICD-10-PCS | Mod: CPTII,S$GLB,, | Performed by: NURSE PRACTITIONER

## 2019-01-30 PROCEDURE — 87480 CANDIDA DNA DIR PROBE: CPT

## 2019-01-30 PROCEDURE — 87491 CHLMYD TRACH DNA AMP PROBE: CPT

## 2019-01-30 PROCEDURE — 99999 PR PBB SHADOW E&M-EST. PATIENT-LVL III: CPT | Mod: PBBFAC,,, | Performed by: NURSE PRACTITIONER

## 2019-01-30 PROCEDURE — 3008F BODY MASS INDEX DOCD: CPT | Mod: CPTII,S$GLB,, | Performed by: NURSE PRACTITIONER

## 2019-01-30 PROCEDURE — 99999 PR PBB SHADOW E&M-EST. PATIENT-LVL III: ICD-10-PCS | Mod: PBBFAC,,, | Performed by: NURSE PRACTITIONER

## 2019-01-30 RX ORDER — FLUCONAZOLE 200 MG/1
200 TABLET ORAL ONCE
Qty: 3 TABLET | Refills: 1 | Status: SHIPPED | OUTPATIENT
Start: 2019-01-30 | End: 2019-01-30

## 2019-01-30 RX ORDER — CLOTRIMAZOLE AND BETAMETHASONE DIPROPIONATE 10; .64 MG/G; MG/G
CREAM TOPICAL
Qty: 15 G | Refills: 1 | Status: SHIPPED | OUTPATIENT
Start: 2019-01-30 | End: 2019-04-18

## 2019-01-30 NOTE — PROGRESS NOTES
CC: Vaginal Discharge    Juan Ramirez is a 38 y.o. female  presents with complaint of vaginal discharge for 2 weeks.  She will be traveling to the [place] for Valentines Day to visit her boyfriend. She reports itching.  denies odor.  She states the discharge is clear and white.  Alleviating factors: none. No new sexual partners.  Reports using Lavender scented Epsom salt baths prior to onset of symptoms. She also changed to a different brand of probiotics prior to onset of symptoms.       ROS:  GENERAL: No fever, chills, fatigability or weight loss.  VULVAR: No pain, no lesions and no itching.  VAGINAL: No relaxation, + itching, + discharge, no abnormal bleeding and no lesions.  ABDOMEN: No abdominal pain. Denies nausea. Denies vomiting. No diarrhea. No constipation  BREAST: Denies pain. No lumps. No discharge.  URINARY: No incontinence, no nocturia, no frequency and no dysuria.  CARDIOVASCULAR: No chest pain. No shortness of breath. No leg cramps.  NEUROLOGICAL: No headaches. No vision changes.    PHYSICAL EXAM:  VULVA: normal appearing vulva with no masses, tenderness or lesions   VAGINA: normal appearing vagina with normal color and + thick discharge, no lesions   CERVIX: normal appearing cervix without discharge or lesions   UTERUS: uterus is normal size, shape, consistency and nontender   ADNEXA: normal adnexa in size, nontender and no masses    ASSESSMENT and PLAN:    ICD-10-CM ICD-9-CM    1. Acute vaginitis N76.0 616.10 C. trachomatis/N. gonorrhoeae by AMP DNA      Vaginosis Screen by DNA Probe      fluconazole (DIFLUCAN) 200 MG Tab      clotrimazole-betamethasone 1-0.05% (LOTRISONE) cream     GCCT  Affirm  Diflucan   Lotrisone      Patient was counseled today on vaginitis prevention including :  a. avoiding feminine products such as deoderant soaps, body wash, bubble bath, douches, scented toilet paper, deoderant tampons or pads, feminine wipes, chronic pad use, etc.  b. avoiding other  vulvovaginal irritants such as long hot baths, humidity, tight, synthetic clothing, chlorine and sitting around in wet bathing suits  c. wearing cotton underwear, avoiding thong underwear and no underwear to bed  d. taking showers instead of baths and use a hair dryer on cool setting afterwards to dry  e. wearing cotton to exercise and shower immediately after exercise and change clothes  f. using polyurethane condoms without spermicide if sexually active and symptoms are triggered by intercourse    FOLLOW UP: PRN lack of improvement.    Janae Oliveira, FNP-C

## 2019-01-31 LAB
C TRACH DNA SPEC QL NAA+PROBE: NOT DETECTED
N GONORRHOEA DNA SPEC QL NAA+PROBE: NOT DETECTED

## 2019-02-26 ENCOUNTER — OFFICE VISIT (OUTPATIENT)
Dept: OBSTETRICS AND GYNECOLOGY | Facility: CLINIC | Age: 39
End: 2019-02-26
Payer: COMMERCIAL

## 2019-02-26 VITALS
DIASTOLIC BLOOD PRESSURE: 62 MMHG | WEIGHT: 117.75 LBS | BODY MASS INDEX: 22.26 KG/M2 | SYSTOLIC BLOOD PRESSURE: 112 MMHG

## 2019-02-26 DIAGNOSIS — Z12.4 ENCOUNTER FOR PAPANICOLAOU SMEAR FOR CERVICAL CANCER SCREENING: ICD-10-CM

## 2019-02-26 DIAGNOSIS — Z80.3 FAMILY HISTORY OF BREAST CANCER IN FIRST DEGREE RELATIVE: ICD-10-CM

## 2019-02-26 DIAGNOSIS — N76.0 ACUTE VAGINITIS: ICD-10-CM

## 2019-02-26 DIAGNOSIS — Z01.419 WOMEN'S ANNUAL ROUTINE GYNECOLOGICAL EXAMINATION: Primary | ICD-10-CM

## 2019-02-26 DIAGNOSIS — Z11.51 SCREENING FOR HPV (HUMAN PAPILLOMAVIRUS): ICD-10-CM

## 2019-02-26 DIAGNOSIS — Z12.31 ENCOUNTER FOR SCREENING MAMMOGRAM FOR BREAST CANCER: ICD-10-CM

## 2019-02-26 PROCEDURE — 99999 PR PBB SHADOW E&M-EST. PATIENT-LVL III: ICD-10-PCS | Mod: PBBFAC,,, | Performed by: NURSE PRACTITIONER

## 2019-02-26 PROCEDURE — 99999 PR PBB SHADOW E&M-EST. PATIENT-LVL III: CPT | Mod: PBBFAC,,, | Performed by: NURSE PRACTITIONER

## 2019-02-26 PROCEDURE — 99395 PR PREVENTIVE VISIT,EST,18-39: ICD-10-PCS | Mod: S$GLB,,, | Performed by: NURSE PRACTITIONER

## 2019-02-26 PROCEDURE — 87510 GARDNER VAG DNA DIR PROBE: CPT

## 2019-02-26 PROCEDURE — 87480 CANDIDA DNA DIR PROBE: CPT

## 2019-02-26 PROCEDURE — 99395 PREV VISIT EST AGE 18-39: CPT | Mod: S$GLB,,, | Performed by: NURSE PRACTITIONER

## 2019-02-26 PROCEDURE — 88175 CYTOPATH C/V AUTO FLUID REDO: CPT

## 2019-02-26 PROCEDURE — 87624 HPV HI-RISK TYP POOLED RSLT: CPT

## 2019-02-26 RX ORDER — LEVOTHYROXINE SODIUM 50 UG/1
CAPSULE ORAL
COMMUNITY
Start: 2019-02-20 | End: 2019-04-18

## 2019-02-26 RX ORDER — FLUCONAZOLE 200 MG/1
200 TABLET ORAL ONCE
Qty: 1 TABLET | Refills: 2 | Status: SHIPPED | OUTPATIENT
Start: 2019-02-26 | End: 2019-02-26

## 2019-02-26 NOTE — PROGRESS NOTES
CC: Annual  HPI: Pt is a 38 y.o.  female who presents for routine annual exam. She is not using any contraception- considering future pregnancy. She does not want STD screening.  Pt c/o vaginal DC and irritation- jose after sex.  [place]  He is not circumcised.   The patient participates in regular exercise: yes.  The patient does not smoke.  The patient wears seatbelts.   Pt denies any domestic violence.     FH:  Breast cancer: mother (age 46 at diagnosis) and paternal grandmother ( from breast canccer)  Colon cancer: none  Ovarian cancer: none  Endometrial cancer: none    ROS:  GENERAL: Feeling well overall. Denies fever or chills.   SKIN: Denies rash or lesions.   HEAD: Denies head injury or headache.   NODES: Denies enlarged lymph nodes.   CHEST: Denies chest pain or shortness of breath.   CARDIOVASCULAR: Denies palpitations or left sided chest pain.   ABDOMEN: No abdominal pain, constipation, diarrhea, nausea, vomiting or rectal bleeding.   URINARY: No dysuria, hematuria, or burning on urination.  REPRODUCTIVE: See HPI.   BREASTS: Denies pain, lumps, or nipple discharge.   HEMATOLOGIC: No easy bruisability or excessive bleeding.   MUSCULOSKELETAL: Denies joint pain or swelling.   NEUROLOGIC: Denies syncope or weakness.   PSYCHIATRIC: Denies depression, anxiety or mood swings.    PE:   APPEARANCE: Well nourished, well developed, White female in no acute distress.  NODES: no cervical, supraclavicular, or inguinal lymphadenopathy  BREASTS: Symmetrical, no skin changes or visible lesions. No palpable masses, nipple discharge or adenopathy bilaterally.  ABDOMEN: Soft. No tenderness or masses. No distention. No hernias palpated. No CVA tenderness.  VULVA: No lesions. Normal external female genitalia.  URETHRAL MEATUS: Normal size and location, no lesions, no prolapse.  URETHRA: No masses, tenderness, or prolapse.  VAGINA: Moist. No lesions or lacerations noted. + discharge present.  No odor present.   CERVIX: No lesions or discharge. No cervical motion tenderness.   UTERUS: Normal size, regular shape, mobile, non-tender.  ADNEXA: No tenderness. No fullness or masses palpated in the adnexal regions.   ANUS PERINEUM: Normal.      Diagnosis:  1. Women's annual routine gynecological examination    2. Encounter for Papanicolaou smear for cervical cancer screening    3. Screening for HPV (human papillomavirus)    4. Encounter for screening mammogram for breast cancer    5. Family history of breast cancer in first degree relative    6. Acute vaginitis        Plan:   Pap/ HPV  Mammo in 10/2019 - annual mammo d/t 1st degree relative with premenopausal breast cancer  Vaginosis screening  Diflucan    Vaginitis Prevention:  a. avoiding feminine products such as deoderant soaps, body wash, bubble bath, douches, scented toilet paper, deoderant tampons or pads, feminine wipes, chronic pad use, etc.  b. avoiding other vulvovaginal irritants such as long hot baths, humidity, tight, synthetic clothing, chlorine and sitting around in wet bathing suits  c. wearing cotton underwear, avoiding thong underwear and no underwear to bed  d. taking showers instead of baths and use a hair dryer on cool setting afterwards to dry  e. wearing cotton to exercise and shower immediately after exercise and change clothes  f. using polyurethane condoms without spermicide if sexually active and symptoms are triggered by intercourse     Orders Placed This Encounter    HPV High Risk Genotypes, PCR    Vaginosis Screen by DNA Probe    Mammo Digital Screening Bilat w/ Aime    Liquid-based pap smear, screening    fluconazole (DIFLUCAN) 200 MG Tab       Patient was counseled today on the new ACS guidelines for cervical cytology screening as well as the current recommendations for breast cancer screening. She was counseled to follow up with her PCP for other routine health maintenance. Counseling session lasted approximately 10  minutes, and all her questions were answered.    Follow-up with me in 1 year for routine exam    Janae Oliveira, TAL

## 2019-03-04 ENCOUNTER — PATIENT MESSAGE (OUTPATIENT)
Dept: OBSTETRICS AND GYNECOLOGY | Facility: CLINIC | Age: 39
End: 2019-03-04

## 2019-03-06 ENCOUNTER — PATIENT MESSAGE (OUTPATIENT)
Dept: OBSTETRICS AND GYNECOLOGY | Facility: CLINIC | Age: 39
End: 2019-03-06

## 2019-03-06 RX ORDER — FLUCONAZOLE 200 MG/1
200 TABLET ORAL
Qty: 2 TABLET | Refills: 0 | Status: SHIPPED | OUTPATIENT
Start: 2019-03-06 | End: 2019-04-18

## 2019-03-08 ENCOUNTER — PATIENT MESSAGE (OUTPATIENT)
Dept: OBSTETRICS AND GYNECOLOGY | Facility: CLINIC | Age: 39
End: 2019-03-08

## 2019-04-15 RX ORDER — SULFAMETHOXAZOLE AND TRIMETHOPRIM 800; 160 MG/1; MG/1
TABLET ORAL
Qty: 30 TABLET | Refills: 3 | Status: SHIPPED | OUTPATIENT
Start: 2019-04-15 | End: 2019-05-24

## 2019-04-18 ENCOUNTER — OFFICE VISIT (OUTPATIENT)
Dept: FAMILY MEDICINE | Facility: CLINIC | Age: 39
End: 2019-04-18
Payer: COMMERCIAL

## 2019-04-18 ENCOUNTER — LAB VISIT (OUTPATIENT)
Dept: LAB | Facility: HOSPITAL | Age: 39
End: 2019-04-18
Attending: PHYSICIAN ASSISTANT
Payer: COMMERCIAL

## 2019-04-18 VITALS
WEIGHT: 114.44 LBS | BODY MASS INDEX: 21.61 KG/M2 | HEIGHT: 61 IN | SYSTOLIC BLOOD PRESSURE: 110 MMHG | HEART RATE: 62 BPM | DIASTOLIC BLOOD PRESSURE: 72 MMHG

## 2019-04-18 DIAGNOSIS — E03.9 HYPOTHYROIDISM, UNSPECIFIED TYPE: ICD-10-CM

## 2019-04-18 DIAGNOSIS — M75.82 TENDINITIS OF LEFT ROTATOR CUFF: ICD-10-CM

## 2019-04-18 DIAGNOSIS — R21 RASH: ICD-10-CM

## 2019-04-18 DIAGNOSIS — E03.9 HYPOTHYROIDISM, UNSPECIFIED TYPE: Primary | ICD-10-CM

## 2019-04-18 LAB
25(OH)D3+25(OH)D2 SERPL-MCNC: 30 NG/ML (ref 30–96)
ALBUMIN SERPL BCP-MCNC: 3.8 G/DL (ref 3.5–5.2)
ALP SERPL-CCNC: 50 U/L (ref 55–135)
ALT SERPL W/O P-5'-P-CCNC: 12 U/L (ref 10–44)
ANION GAP SERPL CALC-SCNC: 8 MMOL/L (ref 8–16)
AST SERPL-CCNC: 17 U/L (ref 10–40)
BASOPHILS # BLD AUTO: 0.05 K/UL (ref 0–0.2)
BASOPHILS NFR BLD: 0.8 % (ref 0–1.9)
BILIRUB SERPL-MCNC: 1 MG/DL (ref 0.1–1)
BUN SERPL-MCNC: 16 MG/DL (ref 6–20)
CALCIUM SERPL-MCNC: 9.4 MG/DL (ref 8.7–10.5)
CHLORIDE SERPL-SCNC: 106 MMOL/L (ref 95–110)
CO2 SERPL-SCNC: 27 MMOL/L (ref 23–29)
CORTIS SERPL-MCNC: 6.8 UG/DL
CREAT SERPL-MCNC: 0.7 MG/DL (ref 0.5–1.4)
CRP SERPL-MCNC: 0.3 MG/L (ref 0–8.2)
DIFFERENTIAL METHOD: ABNORMAL
EOSINOPHIL # BLD AUTO: 0.1 K/UL (ref 0–0.5)
EOSINOPHIL NFR BLD: 0.8 % (ref 0–8)
ERYTHROCYTE [DISTWIDTH] IN BLOOD BY AUTOMATED COUNT: 12.1 % (ref 11.5–14.5)
ERYTHROCYTE [SEDIMENTATION RATE] IN BLOOD BY WESTERGREN METHOD: 4 MM/HR (ref 0–20)
EST. GFR  (AFRICAN AMERICAN): >60 ML/MIN/1.73 M^2
EST. GFR  (NON AFRICAN AMERICAN): >60 ML/MIN/1.73 M^2
GLUCOSE SERPL-MCNC: 82 MG/DL (ref 70–110)
HCT VFR BLD AUTO: 45 % (ref 37–48.5)
HGB BLD-MCNC: 14.2 G/DL (ref 12–16)
IMM GRANULOCYTES # BLD AUTO: 0.01 K/UL (ref 0–0.04)
IMM GRANULOCYTES NFR BLD AUTO: 0.2 % (ref 0–0.5)
LYMPHOCYTES # BLD AUTO: 1.6 K/UL (ref 1–4.8)
LYMPHOCYTES NFR BLD: 26.8 % (ref 18–48)
MCH RBC QN AUTO: 31.4 PG (ref 27–31)
MCHC RBC AUTO-ENTMCNC: 31.6 G/DL (ref 32–36)
MCV RBC AUTO: 100 FL (ref 82–98)
MONOCYTES # BLD AUTO: 0.3 K/UL (ref 0.3–1)
MONOCYTES NFR BLD: 5.5 % (ref 4–15)
NEUTROPHILS # BLD AUTO: 4 K/UL (ref 1.8–7.7)
NEUTROPHILS NFR BLD: 65.9 % (ref 38–73)
NRBC BLD-RTO: 0 /100 WBC
PLATELET # BLD AUTO: 219 K/UL (ref 150–350)
PMV BLD AUTO: 11.7 FL (ref 9.2–12.9)
POTASSIUM SERPL-SCNC: 4 MMOL/L (ref 3.5–5.1)
PROT SERPL-MCNC: 7 G/DL (ref 6–8.4)
RBC # BLD AUTO: 4.52 M/UL (ref 4–5.4)
SODIUM SERPL-SCNC: 141 MMOL/L (ref 136–145)
THYROPEROXIDASE IGG SERPL-ACNC: 95.6 IU/ML
TSH SERPL DL<=0.005 MIU/L-ACNC: 0.49 UIU/ML (ref 0.4–4)
VIT B12 SERPL-MCNC: 679 PG/ML (ref 210–950)
WBC # BLD AUTO: 6.13 K/UL (ref 3.9–12.7)

## 2019-04-18 PROCEDURE — 82533 TOTAL CORTISOL: CPT

## 2019-04-18 PROCEDURE — 84432 ASSAY OF THYROGLOBULIN: CPT

## 2019-04-18 PROCEDURE — 99214 OFFICE O/P EST MOD 30 MIN: CPT | Mod: S$GLB,,, | Performed by: PHYSICIAN ASSISTANT

## 2019-04-18 PROCEDURE — 84443 ASSAY THYROID STIM HORMONE: CPT

## 2019-04-18 PROCEDURE — 3008F BODY MASS INDEX DOCD: CPT | Mod: CPTII,S$GLB,, | Performed by: PHYSICIAN ASSISTANT

## 2019-04-18 PROCEDURE — 3008F PR BODY MASS INDEX (BMI) DOCUMENTED: ICD-10-PCS | Mod: CPTII,S$GLB,, | Performed by: PHYSICIAN ASSISTANT

## 2019-04-18 PROCEDURE — 82607 VITAMIN B-12: CPT

## 2019-04-18 PROCEDURE — 85651 RBC SED RATE NONAUTOMATED: CPT | Mod: PO

## 2019-04-18 PROCEDURE — 82306 VITAMIN D 25 HYDROXY: CPT

## 2019-04-18 PROCEDURE — 99999 PR PBB SHADOW E&M-EST. PATIENT-LVL IV: CPT | Mod: PBBFAC,,, | Performed by: PHYSICIAN ASSISTANT

## 2019-04-18 PROCEDURE — 86140 C-REACTIVE PROTEIN: CPT

## 2019-04-18 PROCEDURE — 86376 MICROSOMAL ANTIBODY EACH: CPT

## 2019-04-18 PROCEDURE — 36415 COLL VENOUS BLD VENIPUNCTURE: CPT | Mod: PO

## 2019-04-18 PROCEDURE — 99214 PR OFFICE/OUTPT VISIT, EST, LEVL IV, 30-39 MIN: ICD-10-PCS | Mod: S$GLB,,, | Performed by: PHYSICIAN ASSISTANT

## 2019-04-18 PROCEDURE — 99999 PR PBB SHADOW E&M-EST. PATIENT-LVL IV: ICD-10-PCS | Mod: PBBFAC,,, | Performed by: PHYSICIAN ASSISTANT

## 2019-04-18 PROCEDURE — 85025 COMPLETE CBC W/AUTO DIFF WBC: CPT

## 2019-04-18 PROCEDURE — 80053 COMPREHEN METABOLIC PANEL: CPT

## 2019-04-18 NOTE — PROGRESS NOTES
Subjective:       Patient ID: Juan Ramirez is a 38 y.o. female    Chief Complaint: Hypothyroidism (pt is requesting labs) and Rash (on nose, pt states that is comes and goes )    HPI   The patient presents today complaining of a rash across her nose that began several years ago and left shoulder pain. She has seen Rheumatology, Dermatology and Endocrinology about her rash.  She has a history of Hashimoto's thyroiditis and has been adjusting her thyroid medication with the help of Dr. Mcgraw but the rash has persisted despite changing Synthroid to Dora.  She has been on her current dose of Dora for only 1 week.  She does admit that after taking antibiotics with her dermatologist the rash improved and no longer covers her cheeks.    The left shoulder pain that began about 3 years ago.  She has noticed weakness and pain in her left shoulder that is worse with lifting and shoulder abduction.  She denies any recent injury.  She does not like to take any over-the-counter medications.    Review of Systems   Constitutional: Negative for chills and fever.   HENT: Negative for congestion.    Eyes: Negative for visual disturbance.   Respiratory: Negative for cough.    Cardiovascular: Negative for chest pain.   Gastrointestinal: Negative for abdominal pain, diarrhea, nausea and vomiting.   Musculoskeletal: Positive for arthralgias (Left shoulder pain).   Skin: Positive for rash ( across her nose).   Neurological: Negative for headaches.        Objective:   Physical Exam   Constitutional: She is oriented to person, place, and time. She appears well-developed and well-nourished. No distress.   HENT:   Head: Normocephalic and atraumatic.   Eyes: Pupils are equal, round, and reactive to light. EOM are normal.   Neck: Normal range of motion. Neck supple.   Cardiovascular: Normal rate, regular rhythm, normal heart sounds and intact distal pulses. Exam reveals no gallop and no friction rub.   No murmur heard.  Pulmonary/Chest:  Effort normal and breath sounds normal. No respiratory distress. She has no wheezes. She has no rales. She exhibits no tenderness.   Musculoskeletal: Normal range of motion. She exhibits tenderness ( tenderness of the left anterior shoulder, limited abduction to 130°, full flexion).   Neurological: She is alert and oriented to person, place, and time.   Skin: Skin is warm and dry. No rash noted. She is not diaphoretic. There is erythema (Erythematous rash across the nose).   Psychiatric: She has a normal mood and affect. Her behavior is normal. Judgment and thought content normal.        Assessment:       1. Hypothyroidism, unspecified type  Ambulatory referral to Endocrinology    TSH    THYROID PEROXIDASE ANTIBODY    Thyroglobulin   2. Tendinitis of left rotator cuff  Ambulatory Referral to Physical/Occupational Therapy    Ambulatory referral to Physical Medicine Rehab   3. Rash  Cortisol    Vitamin D    Vitamin B12    TSH    Comprehensive metabolic panel    CBC auto differential    THYROID PEROXIDASE ANTIBODY    Thyroglobulin    Sedimentation rate    C-reactive protein        Plan:       Hypothyroidism, unspecified type  -     Ambulatory referral to Endocrinology  -     TSH; Future; Expected date: 04/18/2019  -     THYROID PEROXIDASE ANTIBODY; Future; Expected date: 04/18/2019  -     Thyroglobulin; Future; Expected date: 04/18/2019    Tendinitis of left rotator cuff  -     Ambulatory Referral to Physical/Occupational Therapy  -     Ambulatory referral to Physical Medicine Rehab    Rash  -     Cortisol; Future; Expected date: 04/18/2019  -     Vitamin D; Future; Expected date: 04/18/2019  -     Vitamin B12; Future; Expected date: 04/18/2019  -     TSH; Future; Expected date: 04/18/2019  -     Comprehensive metabolic panel; Future; Expected date: 04/18/2019  -     CBC auto differential; Future; Expected date: 04/18/2019  -     THYROID PEROXIDASE ANTIBODY; Future; Expected date: 04/18/2019  -     Thyroglobulin;  Future; Expected date: 04/18/2019  -     Sedimentation rate; Future; Expected date: 04/18/2019  -     C-reactive protein; Future; Expected date: 04/18/2019

## 2019-04-22 LAB
THRYOGLOBULIN INTERPRETATION: ABNORMAL
THYROGLOB AB SERPL-ACNC: <1.8 IU/ML
THYROGLOB SERPL-MCNC: 15 NG/ML

## 2019-04-23 ENCOUNTER — TELEPHONE (OUTPATIENT)
Dept: FAMILY MEDICINE | Facility: CLINIC | Age: 39
End: 2019-04-23

## 2019-04-23 NOTE — TELEPHONE ENCOUNTER
----- Message from Ortega Diaz sent at 4/23/2019  2:21 PM CDT -----  Contact: Patient  Type:  Patient Returning Call    Who Called:  Patient  Who Left Message for Patient:  Amanda  Does the patient know what this is regarding?:  Lab results  Best Call Back Number:  [phone]

## 2019-04-23 NOTE — TELEPHONE ENCOUNTER
----- Message from Suki Andrew PA-C sent at 4/22/2019  3:48 PM CDT -----  Your thyroglobulin and tPA labs show elevation.  There is been no significant change when compared to her recent labs 6 months ago.  I still recommend following up with Endocrinology as we discussed.

## 2019-04-29 DIAGNOSIS — M25.519 CHRONIC SHOULDER PAIN, UNSPECIFIED LATERALITY: Primary | ICD-10-CM

## 2019-04-29 DIAGNOSIS — G89.29 CHRONIC SHOULDER PAIN, UNSPECIFIED LATERALITY: Primary | ICD-10-CM

## 2019-04-30 ENCOUNTER — TELEPHONE (OUTPATIENT)
Dept: OBSTETRICS AND GYNECOLOGY | Facility: CLINIC | Age: 39
End: 2019-04-30

## 2019-04-30 DIAGNOSIS — N91.2 AMENORRHEA: Primary | ICD-10-CM

## 2019-05-01 ENCOUNTER — INITIAL CONSULT (OUTPATIENT)
Dept: PHYSICAL MEDICINE AND REHAB | Facility: CLINIC | Age: 39
End: 2019-05-01
Payer: COMMERCIAL

## 2019-05-01 ENCOUNTER — OFFICE VISIT (OUTPATIENT)
Dept: ENDOCRINOLOGY | Facility: CLINIC | Age: 39
End: 2019-05-01
Payer: COMMERCIAL

## 2019-05-01 VITALS
DIASTOLIC BLOOD PRESSURE: 58 MMHG | WEIGHT: 114.06 LBS | HEART RATE: 51 BPM | HEIGHT: 61 IN | SYSTOLIC BLOOD PRESSURE: 100 MMHG | BODY MASS INDEX: 21.54 KG/M2

## 2019-05-01 VITALS
SYSTOLIC BLOOD PRESSURE: 100 MMHG | DIASTOLIC BLOOD PRESSURE: 58 MMHG | WEIGHT: 113.31 LBS | RESPIRATION RATE: 13 BRPM | BODY MASS INDEX: 21.39 KG/M2 | HEART RATE: 51 BPM | HEIGHT: 61 IN

## 2019-05-01 DIAGNOSIS — E03.8 HYPOTHYROIDISM DUE TO HASHIMOTO'S THYROIDITIS: Primary | ICD-10-CM

## 2019-05-01 DIAGNOSIS — M67.912 TENDINOPATHY OF LEFT ROTATOR CUFF: Primary | ICD-10-CM

## 2019-05-01 DIAGNOSIS — E06.3 HYPOTHYROIDISM DUE TO HASHIMOTO'S THYROIDITIS: Primary | ICD-10-CM

## 2019-05-01 PROCEDURE — 99999 PR PBB SHADOW E&M-EST. PATIENT-LVL IV: ICD-10-PCS | Mod: PBBFAC,,, | Performed by: PHYSICAL MEDICINE & REHABILITATION

## 2019-05-01 PROCEDURE — 99214 OFFICE O/P EST MOD 30 MIN: CPT | Mod: S$GLB,,, | Performed by: INTERNAL MEDICINE

## 2019-05-01 PROCEDURE — 3008F BODY MASS INDEX DOCD: CPT | Mod: CPTII,S$GLB,, | Performed by: INTERNAL MEDICINE

## 2019-05-01 PROCEDURE — 99999 PR PBB SHADOW E&M-EST. PATIENT-LVL IV: CPT | Mod: PBBFAC,,, | Performed by: PHYSICAL MEDICINE & REHABILITATION

## 2019-05-01 PROCEDURE — 99999 PR PBB SHADOW E&M-EST. PATIENT-LVL III: ICD-10-PCS | Mod: PBBFAC,,, | Performed by: INTERNAL MEDICINE

## 2019-05-01 PROCEDURE — 3008F PR BODY MASS INDEX (BMI) DOCUMENTED: ICD-10-PCS | Mod: CPTII,S$GLB,, | Performed by: INTERNAL MEDICINE

## 2019-05-01 PROCEDURE — 99999 PR PBB SHADOW E&M-EST. PATIENT-LVL III: CPT | Mod: PBBFAC,,, | Performed by: INTERNAL MEDICINE

## 2019-05-01 PROCEDURE — 99243 OFF/OP CNSLTJ NEW/EST LOW 30: CPT | Mod: S$GLB,,, | Performed by: PHYSICAL MEDICINE & REHABILITATION

## 2019-05-01 PROCEDURE — 99214 PR OFFICE/OUTPT VISIT, EST, LEVL IV, 30-39 MIN: ICD-10-PCS | Mod: S$GLB,,, | Performed by: INTERNAL MEDICINE

## 2019-05-01 PROCEDURE — 99243 PR OFFICE CONSULTATION,LEVEL III: ICD-10-PCS | Mod: S$GLB,,, | Performed by: PHYSICAL MEDICINE & REHABILITATION

## 2019-05-01 RX ORDER — DOXYCYCLINE HYCLATE 100 MG
100 TABLET ORAL EVERY 12 HOURS
COMMUNITY
End: 2019-05-24

## 2019-05-01 NOTE — LETTER
May 1, 2019      Suki Andrew PA-C  [address] [facility] [address] [place] [address]           [facility]  [address] [facility] [address] [place] [address]  Phone: [phone]  Fax: [phone]          Patient: Juan Ramirez   MR Number: [id number]   YOB: 1980   Date of Visit: 5/1/2019       Dear Suki Andrew:    Thank you for referring Juan Ramirez to me for evaluation. Attached you will find relevant portions of my assessment and plan of care.    If you have questions, please do not hesitate to call me. I look forward to following Juan Ramirez along with you.    Sincerely,    Phill Fried MD    Enclosure  CC:  No Recipients    If you would like to receive this communication electronically, please contact [email or web address] or [phone] to request more information on [employer] Link access.    For providers and/or their staff who would like to refer a patient to [facility], please contact us through our one-stop-shop provider referral line, [facility], at [phone].    If you feel you have received this communication in error or would no longer like to receive these types of communications, please e-mail [email or web address]
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
## 2019-05-01 NOTE — PROGRESS NOTES
CHIEF COMPLAINT: Hypothyroidism  38 year old being seen as a new patient. Last saw [facility] in 2017. Hypothyroidism diagnosed 2012. On armour 60 mg daily. Was on synthroid and Tirosint in the past. States that last year she had a w/u for lupus due to facial rash. Told by derm that had rosacea. She was concerned about whether it was due to the thyroid. Was on tirosint at the time. Then she switched to armour. States thinking about having another child.       PAST MEDICAL HISTORY/PAST SURGICAL HISTORY:  Reviewed in [facility]    SOCIAL HISTORY: Quit smoking 1995. No alcohol.     FAMILY HISTORY:  + Breast Ca. No known with thyroid disease.     MEDICATIONS/ALLERGIES: The patient's MedCard has been updated and reviewed.      ROS:   Constitutional: No recent significant weight change  Eyes: No recent visual changes  ENT: No dysphagia  Cardiovascular: Denies current anginal symptoms  Respiratory: Denies current respiratory difficulty  Gastrointestinal: Denies recent bowel disturbances  GenitoUrinary - No dysuria  Skin: No new skin rash  Neurologic: No focal neurologic complaints  Remainder ROS negative        PE:    GENERAL: Well developed, well nourished.  PSYCH:  appropriate mood and affect  EYES:  PERRL, EOM intact.  ENT: Nares patent, oropharynx clear, mucosa pink,   NECK: Supple, trachea midline, no palpable thyroid nodules  CHEST: Resp even and unlabored, CTA bilateral.  CARDIAC: RRR, S1, S2 heard, no murmurs, rubs, S3, or S4  ABDOMEN: Soft, non-tender, non-distended;  No organomegaly  VASCULAR:  DP pulses +2/4 bilaterally, no edema  NEURO: Gait steady, CN II-VII grossly intact  SKIN: No areas of breakdown, no acanthosis nigracans.    LABS   Results for MICAELA FONG (MRN [id number]) as of 5/1/2019 13:38   Ref. Range 4/18/2019 09:49   TSH Latest Ref Range: 0.400 - 4.000 uIU/mL 0.492   Thyroglobulin Interpretation Unknown SEE BELOW   Thyroglobulin Antibody Screen Latest Ref Range: <4.0 IU/mL <1.8   Thyroglobulin,  Tumor Marker Latest Units: ng/mL 15 (H)   Thyroperoxidase Antibodies Latest Ref Range: <6.0 IU/mL 95.6 (H)     Indication: Hashimoto's thyroiditis    Real time ultrasound was performed in 2 planes using a [employer] P6 ultrasound machine.    THYROID GLAND demonstrates heterogeneous, predominantly hypoechoic parenchyma. Vascularity is normal.    RIGHT LOBE of the thyroid measures: 3.92 x 0.94 x 1.31 cm.  ISTHMUS measures 0.35 cm in AP diameter.  LEFT LOBE measures: 2.74 x 0.72 x 1.09 cm.    COMPARISON: none      Impression       Heterogeneous thyroid consistent with autoimmune thyroiditis.  No nodules identified.    RECOMMENDATIONS:  No need to repeat neck ultrasound unless clinical changes.          ASSESSMENT/PLAN:  1. Hypothyroidism- TPO +- discussed does not need to have TPO Ab repeated. Not recommended to follow TPO levels. Discussed risks of [place] during pregnancy. Advised not getting pregnant until TFT normalized on LT4. SHe states she will switch back to tirosint. Discussed unsure if redness in face that she had was due to thyroid. Advised seeing derm.       FOLLOWUP  TSH, Ft4, T3- 4 weeks

## 2019-05-01 NOTE — LETTER
May 1, 2019      Suki Andrew PA-C  [address] [facility] [address] [place] [address]           [place]  [address] [facility] [address] [place] [address]  Phone: [phone]  Fax: [phone]          Patient: Juan Ramirez   MR Number: [id number]   YOB: 1980   Date of Visit: 5/1/2019       Dear Suki Andrew:    Thank you for referring Juan Ramirez to me for evaluation. Attached you will find relevant portions of my assessment and plan of care.    If you have questions, please do not hesitate to call me. I look forward to following Juan Ramirez along with you.    Sincerely,    Edward Mar, DO [place]  CC:  No Recipients    If you would like to receive this communication electronically, please contact [email or web address] or [phone] to request more information on [employer] Link access.    For providers and/or their staff who would like to refer a patient to [facility], please contact us through our one-stop-shop provider referral line, [facility], at [phone].    If you feel you have received this communication in error or would no longer like to receive these types of communications, please e-mail [email or web address]

## 2019-05-02 ENCOUNTER — OFFICE VISIT (OUTPATIENT)
Dept: OBSTETRICS AND GYNECOLOGY | Facility: CLINIC | Age: 39
End: 2019-05-02
Payer: COMMERCIAL

## 2019-05-02 ENCOUNTER — LAB VISIT (OUTPATIENT)
Dept: LAB | Facility: OTHER | Age: 39
End: 2019-05-02
Payer: COMMERCIAL

## 2019-05-02 VITALS
HEIGHT: 61 IN | WEIGHT: 113.31 LBS | BODY MASS INDEX: 21.39 KG/M2 | DIASTOLIC BLOOD PRESSURE: 64 MMHG | SYSTOLIC BLOOD PRESSURE: 102 MMHG

## 2019-05-02 DIAGNOSIS — Z31.69 ENCOUNTER FOR PRECONCEPTION CONSULTATION: ICD-10-CM

## 2019-05-02 DIAGNOSIS — Z31.69 ENCOUNTER FOR PRECONCEPTION CONSULTATION: Primary | ICD-10-CM

## 2019-05-02 PROCEDURE — 99213 PR OFFICE/OUTPT VISIT, EST, LEVL III, 20-29 MIN: ICD-10-PCS | Mod: S$GLB,,, | Performed by: NURSE PRACTITIONER

## 2019-05-02 PROCEDURE — 36415 COLL VENOUS BLD VENIPUNCTURE: CPT

## 2019-05-02 PROCEDURE — 99213 OFFICE O/P EST LOW 20 MIN: CPT | Mod: S$GLB,,, | Performed by: NURSE PRACTITIONER

## 2019-05-02 PROCEDURE — 83520 IMMUNOASSAY QUANT NOS NONAB: CPT

## 2019-05-02 PROCEDURE — 3008F PR BODY MASS INDEX (BMI) DOCUMENTED: ICD-10-PCS | Mod: CPTII,S$GLB,, | Performed by: NURSE PRACTITIONER

## 2019-05-02 PROCEDURE — 3008F BODY MASS INDEX DOCD: CPT | Mod: CPTII,S$GLB,, | Performed by: NURSE PRACTITIONER

## 2019-05-02 PROCEDURE — 99999 PR PBB SHADOW E&M-EST. PATIENT-LVL III: CPT | Mod: PBBFAC,,, | Performed by: NURSE PRACTITIONER

## 2019-05-02 PROCEDURE — 99999 PR PBB SHADOW E&M-EST. PATIENT-LVL III: ICD-10-PCS | Mod: PBBFAC,,, | Performed by: NURSE PRACTITIONER

## 2019-05-02 NOTE — PROGRESS NOTES
CC: trying to conceive    HPI: Pt is a 38 y.o.  female who presents to discuss future fertility.  She and her partner are trying to conceive.  [place]  Prior  X 3- all full term/ uncomplicated deliveries.  Prior ectopic pregnancy and right salpingectomy.  Reports regular monthly cycles.       ROS:  GENERAL: Feeling well overall. Denies fever or chills.   SKIN: Denies rash or lesions.   HEAD: Denies head injury or headache.   NODES: Denies enlarged lymph nodes.   CHEST: Denies chest pain or shortness of breath.   CARDIOVASCULAR: Denies palpitations or left sided chest pain.   ABDOMEN: No abdominal pain, constipation, diarrhea, nausea, vomiting or rectal bleeding.   URINARY: No dysuria, hematuria, or burning on urination.  REPRODUCTIVE: See HPI.   BREASTS: Denies pain, lumps, or nipple discharge.   HEMATOLOGIC: No easy bruisability or excessive bleeding.   MUSCULOSKELETAL: Denies joint pain or swelling.   NEUROLOGIC: Denies syncope or weakness.   PSYCHIATRIC: Denies depression, anxiety or mood swings.    PE:   APPEARANCE: Well nourished, well developed, White female in no acute distress.  PELVIS: Deferred      Diagnosis:  1. Encounter for preconception consultation        Plan:   Preconceptional counseling/folic acid recommendation/mentrual calendar/mid-cycle relations discussed  [facility]   Discussed may need to refer to fertility     Orders Placed This Encounter    Antimullerian hormone ([facility])         Follow-up PRN no resolution of symptoms.    TAL Mcrae

## 2019-05-06 ENCOUNTER — PATIENT MESSAGE (OUTPATIENT)
Dept: ENDOCRINOLOGY | Facility: CLINIC | Age: 39
End: 2019-05-06

## 2019-05-06 LAB — MIS SERPL-MCNC: 5.3 NG/ML (ref 0.9–9.5)

## 2019-05-07 ENCOUNTER — OFFICE VISIT (OUTPATIENT)
Dept: DERMATOLOGY | Facility: CLINIC | Age: 39
End: 2019-05-07
Payer: COMMERCIAL

## 2019-05-07 ENCOUNTER — LAB VISIT (OUTPATIENT)
Dept: LAB | Facility: HOSPITAL | Age: 39
End: 2019-05-07
Attending: INTERNAL MEDICINE
Payer: COMMERCIAL

## 2019-05-07 ENCOUNTER — PATIENT MESSAGE (OUTPATIENT)
Dept: OBSTETRICS AND GYNECOLOGY | Facility: CLINIC | Age: 39
End: 2019-05-07

## 2019-05-07 VITALS — BODY MASS INDEX: 21.34 KG/M2 | WEIGHT: 113 LBS | HEIGHT: 61 IN

## 2019-05-07 DIAGNOSIS — E06.3 HYPOTHYROIDISM DUE TO HASHIMOTO'S THYROIDITIS: ICD-10-CM

## 2019-05-07 DIAGNOSIS — L71.9 ROSACEA: ICD-10-CM

## 2019-05-07 DIAGNOSIS — Z31.69 PRE-CONCEPTION COUNSELING: ICD-10-CM

## 2019-05-07 DIAGNOSIS — E03.8 HYPOTHYROIDISM DUE TO HASHIMOTO'S THYROIDITIS: ICD-10-CM

## 2019-05-07 DIAGNOSIS — L70.9 ACNE, UNSPECIFIED ACNE TYPE: ICD-10-CM

## 2019-05-07 DIAGNOSIS — L85.8 KP (KERATOSIS PILARIS): Primary | ICD-10-CM

## 2019-05-07 DIAGNOSIS — E03.9 HYPOTHYROIDISM, UNSPECIFIED TYPE: ICD-10-CM

## 2019-05-07 LAB
T3 SERPL-MCNC: 79 NG/DL (ref 60–180)
T4 FREE SERPL-MCNC: 0.86 NG/DL (ref 0.71–1.51)
TSH SERPL DL<=0.005 MIU/L-ACNC: 1.2 UIU/ML (ref 0.4–4)

## 2019-05-07 PROCEDURE — 99999 PR PBB SHADOW E&M-EST. PATIENT-LVL III: ICD-10-PCS | Mod: PBBFAC,,, | Performed by: DERMATOLOGY

## 2019-05-07 PROCEDURE — 84439 ASSAY OF FREE THYROXINE: CPT

## 2019-05-07 PROCEDURE — 36415 COLL VENOUS BLD VENIPUNCTURE: CPT | Mod: PO

## 2019-05-07 PROCEDURE — 99214 PR OFFICE/OUTPT VISIT, EST, LEVL IV, 30-39 MIN: ICD-10-PCS | Mod: S$GLB,,, | Performed by: DERMATOLOGY

## 2019-05-07 PROCEDURE — 3008F PR BODY MASS INDEX (BMI) DOCUMENTED: ICD-10-PCS | Mod: CPTII,S$GLB,, | Performed by: DERMATOLOGY

## 2019-05-07 PROCEDURE — 99999 PR PBB SHADOW E&M-EST. PATIENT-LVL III: CPT | Mod: PBBFAC,,, | Performed by: DERMATOLOGY

## 2019-05-07 PROCEDURE — 84443 ASSAY THYROID STIM HORMONE: CPT

## 2019-05-07 PROCEDURE — 3008F BODY MASS INDEX DOCD: CPT | Mod: CPTII,S$GLB,, | Performed by: DERMATOLOGY

## 2019-05-07 PROCEDURE — 99214 OFFICE O/P EST MOD 30 MIN: CPT | Mod: S$GLB,,, | Performed by: DERMATOLOGY

## 2019-05-07 PROCEDURE — 84480 ASSAY TRIIODOTHYRONINE (T3): CPT

## 2019-05-07 RX ORDER — ERYTHROMYCIN 20 MG/G
GEL TOPICAL 2 TIMES DAILY
Qty: 30 G | Refills: 2 | Status: SHIPPED | OUTPATIENT
Start: 2019-05-07 | End: 2019-09-17

## 2019-05-07 NOTE — PATIENT INSTRUCTIONS
"Keratosis pilaris (ker-uh-TOE-sis pih-LAIR-is) is a common, harmless skin condition that causes dry, rough patches and tiny bumps, usually on the upper arms,chest,  thighs, cheeks or buttocks. The bumps generally don't hurt or itch. Keratosis pilaris is often considered a variant of normal skin.    Your doctor has advised washing the "bumpy" areas with a salicylic acid wash at 2-3% daily.  This will help to thin out the bumpiness with continuous daily use over time.         Dry skin Management    Do not use hot water. Hot water removes your natural skin oils more quickly.  Warm water is best for bathing.  Temperature should be no more  than 2 degrees higher than body temp.    Use a gentle cleanser.  Soaps can strip oils from the skin.  Stop using deodorant, antibacterial, or perfumed soaps. Try a glycerin soap with the fewest ingredients and no fragrance.  Wash only the "stinky" areas and use plain water for rest of the body.    Use hand  as often as possible instead of washing your hands. Wear rubber gloves when cleaning.    Moisturize right after showers or baths. To lock in moisture from a bath or shower, apply moisturizer while the skin is still damp.  Coconut oil, Olive oil, and Jojoba oils that are plain and organic are best.  Moisturize at minimum of 2-3 times a day.  Remember that medicated creams are just that--medication.  They are not intended to replace moisturizing but to be used in adjunct.    Adult Acne  Stabilize and be consistent with thyroid meds vs alternating. If attempting to get pregnant, stop doxycycline.  Look at all meds for pregnancy category as many acne meds are contra indicated with pregnancy and can affect fetus if in system prior to conception.     Use sulfur soap and topical antibiotic only at this time. Follow up as needed.  "

## 2019-05-07 NOTE — PROGRESS NOTES
"Last seen by Dr. Salazar in 2017 for rosacea. Patient here today for rash and redness to face on and off since about 2017. Has taken self off and put self back on [place] in last few months as well as alternating the levothyroxine.  Attributes redness and break outs with the thyroid medications.     Not currently using any topicals but has used metrogel in past without good results. Started on doxycycline 100mg bid about 2 weeks ago. No change to "breakout" seen with medication.    Patient going on vacation next week. Not currently on birth control as is actively trying to get pregnant.        Subjective:       Patient ID:  Juan Ramirez is a 38 y.o. female who presents for   Chief Complaint   Patient presents with    Rash     face     HPI    Review of Systems   Constitutional: Negative for fever.   HENT: Negative for sore throat and mouth sores.    Eyes: Negative for itching and eye watering.   Respiratory: Negative for cough.    Genitourinary: Negative for irregular periods.   Musculoskeletal: Negative for joint swelling and arthralgias.   Skin: Positive for rash, dry skin, activity-related sunscreen use and wears hat. Negative for daily sunscreen use.   Hematologic/Lymphatic: Does not bruise/bleed easily.        Objective:    Physical Exam   Constitutional: She appears well-developed and well-nourished.   Eyes: No conjunctival no injection.   Neurological: She is alert and oriented to person, place, and time.   Psychiatric: She has a normal mood and affect.   Skin:   Areas Examined (abnormalities noted in diagram):   Head / Face Inspection Performed  Neck Inspection Performed  Chest / Axilla Inspection Performed  Back Inspection Performed  RUE Inspected  LUE Inspection Performed  Nails and Digits Inspection Performed                   Diagram Legend     Erythematous scaling macule/papule c/w actinic keratosis       Vascular papule c/w angioma      Pigmented verrucoid papule/plaque c/w seborrheic keratosis      " Yellow umbilicated papule c/w sebaceous hyperplasia      Irregularly shaped tan macule c/w lentigo     1-2 mm smooth white papules consistent with Milia      Movable subcutaneous cyst with punctum c/w epidermal inclusion cyst      Subcutaneous movable cyst c/w pilar cyst      Firm pink to brown papule c/w dermatofibroma      Pedunculated fleshy papule(s) c/w skin tag(s)      Evenly pigmented macule c/w junctional nevus     Mildly variegated pigmented, slightly irregular-bordered macule c/w mildly atypical nevus      Flesh colored to evenly pigmented papule c/w intradermal nevus       Pink pearly papule/plaque c/w basal cell carcinoma      Erythematous hyperkeratotic cursted plaque c/w SCC      Surgical scar with no sign of skin cancer recurrence      Open and closed comedones      Inflammatory papules and pustules      Verrucoid papule consistent consistent with wart     Erythematous eczematous patches and plaques     Dystrophic onycholytic nail with subungual debris c/w onychomycosis     Umbilicated papule    Erythematous-base heme-crusted tan verrucoid plaque consistent with inflamed seborrheic keratosis     Erythematous Silvery Scaling Plaque c/w Psoriasis     See annotation      Assessment / Plan:        KP (keratosis pilaris)  Discussed with patient the etiology and pathogenesis of the disease or skin lesion(s) and possible treatments and aggravators.    Good skin care regimen discussed including limiting to one bath or shower/day, using lukewarm water with mild soap and moisturizing cream to skin 1 - 2x/day. Brochure was provided and reviewed with patient.  Recommended a salicylic acid OTC wash to be used regularly for acne, keratosis pilaris, actinic keratoses, or as needed for oil control.    Acne, unspecified acne type  Discussed with patient the etiology and pathogenesis of the disease or skin lesion(s) and possible treatments and aggravators.    Reviewed with patient different treatment options and  associated risks.  To her knowledge, patient is not currently pregnant. Patient understands that she must discontinue all acne medications if she becomes pregnant.  Patient to start using sulfur bar soap for the face 1-2 x day.  For scalp usage lather from the soap to be applied to the roots of the scalp and allow to sit for 3-5 minutes regularly.  emgel bid.  Pt to check sulfur soap and emgel with her ob if she wants to conceive.  Diet with increased fiber, avoidance of sugars, avoidance of wheat and white flour and dairy all discussed.    Rosacea  Discussed with patient the etiology and pathogenesis of the disease or skin lesion(s) and possible treatments and aggravators.    Stop doxy if conceiving.  Patient to start using sulfur bar soap for the face 1-2 x day.  For scalp usage lather from the soap to be applied to the roots of the scalp and allow to sit for 3-5 minutes regularly.  To her knowledge, patient is not currently pregnant. Patient understands that she must discontinue all acne medications if she becomes pregnant.    Hypothyroidism, unspecified type  Discussed with patient the need for laboratory work up for further evaluation.  Discussed that such investigation may not be helpful.  On armour.    Pre-conception counseling  To her knowledge, patient is not currently pregnant. Patient understands that she must discontinue all acne medications if she becomes pregnant.             Follow up if symptoms worsen or fail to improve.

## 2019-05-09 ENCOUNTER — TELEPHONE (OUTPATIENT)
Dept: ENDOCRINOLOGY | Facility: CLINIC | Age: 39
End: 2019-05-09

## 2019-05-09 DIAGNOSIS — E06.3 HYPOTHYROIDISM DUE TO HASHIMOTO'S THYROIDITIS: Primary | ICD-10-CM

## 2019-05-09 DIAGNOSIS — E03.8 HYPOTHYROIDISM DUE TO HASHIMOTO'S THYROIDITIS: Primary | ICD-10-CM

## 2019-05-09 NOTE — TELEPHONE ENCOUNTER
Pt advised of 's message below.  She states she was still on the [place] when she had the levels done but has now switched to Tirosint 50 mcg daily because of the breakouts she was having.  States she can already tell a difference in her skin on the Tirosint.  Repeat levels in 6 weeks?

## 2019-05-24 ENCOUNTER — OFFICE VISIT (OUTPATIENT)
Dept: OBSTETRICS AND GYNECOLOGY | Facility: CLINIC | Age: 39
End: 2019-05-24
Payer: COMMERCIAL

## 2019-05-24 VITALS — DIASTOLIC BLOOD PRESSURE: 50 MMHG | WEIGHT: 115.94 LBS | SYSTOLIC BLOOD PRESSURE: 82 MMHG | BODY MASS INDEX: 21.91 KG/M2

## 2019-05-24 DIAGNOSIS — N76.0 ACUTE VAGINITIS: Primary | ICD-10-CM

## 2019-05-24 PROCEDURE — 99999 PR PBB SHADOW E&M-EST. PATIENT-LVL II: ICD-10-PCS | Mod: PBBFAC,,, | Performed by: NURSE PRACTITIONER

## 2019-05-24 PROCEDURE — 3008F PR BODY MASS INDEX (BMI) DOCUMENTED: ICD-10-PCS | Mod: CPTII,S$GLB,, | Performed by: NURSE PRACTITIONER

## 2019-05-24 PROCEDURE — 87480 CANDIDA DNA DIR PROBE: CPT

## 2019-05-24 PROCEDURE — 99999 PR PBB SHADOW E&M-EST. PATIENT-LVL II: CPT | Mod: PBBFAC,,, | Performed by: NURSE PRACTITIONER

## 2019-05-24 PROCEDURE — 99213 PR OFFICE/OUTPT VISIT, EST, LEVL III, 20-29 MIN: ICD-10-PCS | Mod: S$GLB,,, | Performed by: NURSE PRACTITIONER

## 2019-05-24 PROCEDURE — 99213 OFFICE O/P EST LOW 20 MIN: CPT | Mod: S$GLB,,, | Performed by: NURSE PRACTITIONER

## 2019-05-24 PROCEDURE — 3008F BODY MASS INDEX DOCD: CPT | Mod: CPTII,S$GLB,, | Performed by: NURSE PRACTITIONER

## 2019-05-24 PROCEDURE — 87510 GARDNER VAG DNA DIR PROBE: CPT

## 2019-05-24 RX ORDER — CLOTRIMAZOLE AND BETAMETHASONE DIPROPIONATE 10; .64 MG/G; MG/G
CREAM TOPICAL
Qty: 15 G | Refills: 1 | Status: SHIPPED | OUTPATIENT
Start: 2019-05-24 | End: 2019-09-17

## 2019-05-24 RX ORDER — FLUCONAZOLE 200 MG/1
200 TABLET ORAL ONCE
Qty: 3 TABLET | Refills: 1 | Status: SHIPPED | OUTPATIENT
Start: 2019-05-24 | End: 2019-05-24

## 2019-05-24 NOTE — PROGRESS NOTES
CC: Vaginal Discharge    Juan Ramirez is a 38 y.o. female  presents with complaint of vaginal irritation discharge for 1 week.  She reports itching.  denies odor.  She states the discharge is white and brown.  Alleviating factors: none. No new sexual partners.  Reports had some pink brown spotting on  after intercourse then brown spotting again on 19.  Denies any associated pain.  [place]- after each visit she is getting yeast infections.       ROS:  GENERAL: No fever, chills, fatigability or weight loss.  VULVAR: No pain, no lesions and no itching.  VAGINAL: No relaxation, + itching, + discharge, no abnormal bleeding and no lesions.  ABDOMEN: No abdominal pain. Denies nausea. Denies vomiting. No diarrhea. No constipation  BREAST: Denies pain. No lumps. No discharge.  URINARY: No incontinence, no nocturia, no frequency and no dysuria.  CARDIOVASCULAR: No chest pain. No shortness of breath. No leg cramps.  NEUROLOGICAL: No headaches. No vision changes.    PHYSICAL EXAM:  VULVA: normal appearing vulva with no masses, tenderness or lesions   VAGINA: normal appearing vagina with normal color and + thick discharge, no lesions   CERVIX: normal appearing cervix without discharge or lesions   UTERUS: uterus is normal size, shape, consistency and nontender   ADNEXA: normal adnexa in size, nontender and no masses    ASSESSMENT and PLAN:    ICD-10-CM ICD-9-CM    1. Acute vaginitis N76.0 616.10 Vaginosis Screen by DNA Probe      fluconazole (DIFLUCAN) 200 MG Tab      clotrimazole-betamethasone 1-0.05% (LOTRISONE) cream      boric acid (BORIC ACID) vaginal suppository     Vaginosis cx  Diflucan  Lotrisone   Boric Acid suppositories PRN     Patient was counseled today on vaginitis prevention including :  a. avoiding feminine products such as deoderant soaps, body wash, bubble bath, douches, scented toilet paper, deoderant tampons or pads, feminine wipes, chronic pad use, etc.  b. avoiding other  vulvovaginal irritants such as long hot baths, humidity, tight, synthetic clothing, chlorine and sitting around in wet bathing suits  c. wearing cotton underwear, avoiding thong underwear and no underwear to bed  d. taking showers instead of baths and use a hair dryer on cool setting afterwards to dry  e. wearing cotton to exercise and shower immediately after exercise and change clothes  f. using polyurethane condoms without spermicide if sexually active and symptoms are triggered by intercourse    FOLLOW UP: PRN lack of improvement.    Janae Oliveira, FNP-C

## 2019-05-26 LAB
BACTERIAL VAGINOSIS DNA: NEGATIVE
CANDIDA GLABRATA DNA: NEGATIVE
CANDIDA KRUSEI DNA: NEGATIVE
CANDIDA RRNA VAG QL PROBE: POSITIVE
T VAGINALIS RRNA GENITAL QL PROBE: NEGATIVE

## 2019-06-04 ENCOUNTER — OFFICE VISIT (OUTPATIENT)
Dept: URGENT CARE | Facility: CLINIC | Age: 39
End: 2019-06-04
Payer: OTHER MISCELLANEOUS

## 2019-06-04 VITALS
OXYGEN SATURATION: 99 % | HEIGHT: 61 IN | WEIGHT: 115 LBS | DIASTOLIC BLOOD PRESSURE: 63 MMHG | BODY MASS INDEX: 21.71 KG/M2 | SYSTOLIC BLOOD PRESSURE: 101 MMHG | HEART RATE: 66 BPM | TEMPERATURE: 99 F | RESPIRATION RATE: 16 BRPM

## 2019-06-04 DIAGNOSIS — T14.90XA TRAUMA: Primary | ICD-10-CM

## 2019-06-04 DIAGNOSIS — S00.33XA CONTUSION OF NOSE, INITIAL ENCOUNTER: ICD-10-CM

## 2019-06-04 PROCEDURE — 99203 PR OFFICE/OUTPT VISIT, NEW, LEVL III, 30-44 MIN: ICD-10-PCS | Mod: S$GLB,,, | Performed by: FAMILY MEDICINE

## 2019-06-04 PROCEDURE — 99203 OFFICE O/P NEW LOW 30 MIN: CPT | Mod: S$GLB,,, | Performed by: FAMILY MEDICINE

## 2019-06-04 PROCEDURE — 70160 XR NASAL BONES: ICD-10-PCS | Mod: S$GLB,,, | Performed by: RADIOLOGY

## 2019-06-04 PROCEDURE — 70160 X-RAY EXAM OF NASAL BONES: CPT | Mod: S$GLB,,, | Performed by: RADIOLOGY

## 2019-06-04 NOTE — PROGRESS NOTES
"Subjective:       Patient ID: Juan Ramirez is a 38 y.o. female.    Vitals:  height is 5' 1" (1.549 m) and weight is 52.2 kg (115 lb). Her temperature is 98.5 °F (36.9 °C). Her blood pressure is 101/63 and her pulse is 66. Her respiration is 16 and oxygen saturation is 99%.     Chief Complaint: Facial Injury    Pt as hit in the nose with a box at work today that fell off a shelf above her.    Facial Injury    The incident occurred less than 1 hour ago. The injury mechanism was a direct blow. The pain is mild. Pertinent negatives include no blurred vision or weakness.       Constitution: Negative for fatigue.   HENT: Positive for facial trauma. Negative for facial swelling.    Neck: Negative for neck stiffness.   Cardiovascular: Negative for chest trauma.   Eyes: Negative for eye trauma, double vision and blurred vision.   Gastrointestinal: Negative for abdominal trauma, abdominal pain and rectal bleeding.   Genitourinary: Negative for hematuria, missed menses, genital trauma and pelvic pain.   Musculoskeletal: Negative for pain, trauma, joint swelling and abnormal ROM of joint.   Skin: Negative for color change, wound, abrasion, laceration and bruising.   Neurological: Negative for dizziness, history of vertigo, light-headedness, coordination disturbances, altered mental status and loss of consciousness.   Hematologic/Lymphatic: Negative for history of bleeding disorder.   Psychiatric/Behavioral: Negative for altered mental status.       Objective:      Physical Exam   Constitutional: She appears well-developed and well-nourished.   HENT:   Head: Normocephalic and atraumatic.   Right Ear: External ear normal.   Left Ear: External ear normal.   Nose: Nose normal.   Mouth/Throat: Oropharynx is clear and moist. No oropharyngeal exudate.   Eyes: Conjunctivae are normal. Right eye exhibits no discharge. Left eye exhibits no discharge.   Cardiovascular: Normal rate, regular rhythm and normal heart sounds. "   Pulmonary/Chest: Effort normal and breath sounds normal. She has no wheezes.   Skin: Skin is warm and dry.   Bridge of nose with small superficial abrasion and surrounding erythema   Psychiatric: She has a normal mood and affect. Her behavior is normal.   Vitals reviewed.      Assessment:       1. Trauma    2. Contusion of nose, initial encounter        Plan:         Trauma  -     XR NASAL BONES; Future; Expected date: 06/04/2019    Contusion of nose, initial encounter

## 2019-06-20 ENCOUNTER — LAB VISIT (OUTPATIENT)
Dept: LAB | Facility: HOSPITAL | Age: 39
End: 2019-06-20
Attending: INTERNAL MEDICINE
Payer: COMMERCIAL

## 2019-06-20 DIAGNOSIS — E06.3 HYPOTHYROIDISM DUE TO HASHIMOTO'S THYROIDITIS: ICD-10-CM

## 2019-06-20 DIAGNOSIS — E03.8 HYPOTHYROIDISM DUE TO HASHIMOTO'S THYROIDITIS: ICD-10-CM

## 2019-06-20 LAB — TSH SERPL DL<=0.005 MIU/L-ACNC: 1.73 UIU/ML (ref 0.4–4)

## 2019-06-20 PROCEDURE — 84443 ASSAY THYROID STIM HORMONE: CPT

## 2019-06-20 PROCEDURE — 36415 COLL VENOUS BLD VENIPUNCTURE: CPT | Mod: PO

## 2019-06-21 ENCOUNTER — TELEPHONE (OUTPATIENT)
Dept: ENDOCRINOLOGY | Facility: CLINIC | Age: 39
End: 2019-06-21

## 2019-07-05 ENCOUNTER — PATIENT MESSAGE (OUTPATIENT)
Dept: ENDOCRINOLOGY | Facility: CLINIC | Age: 39
End: 2019-07-05

## 2019-07-05 DIAGNOSIS — E06.3 HYPOTHYROIDISM DUE TO HASHIMOTO'S THYROIDITIS: Primary | ICD-10-CM

## 2019-07-05 DIAGNOSIS — E03.8 HYPOTHYROIDISM DUE TO HASHIMOTO'S THYROIDITIS: Primary | ICD-10-CM

## 2019-07-05 NOTE — TELEPHONE ENCOUNTER
Ok. Does not need repeat labs.     Can you clarify with patient if still planning on getting pregnant.. BAsed in [employer] message 5/6/19 it sounded like she was planning on getting pregnant. Unless I misunderstood. If so would not recommend decreasing dose. Ideally if planning om pregnancy, would recommend TSH < 2.5

## 2019-09-16 ENCOUNTER — PATIENT MESSAGE (OUTPATIENT)
Dept: OBSTETRICS AND GYNECOLOGY | Facility: CLINIC | Age: 39
End: 2019-09-16

## 2019-09-16 ENCOUNTER — PATIENT MESSAGE (OUTPATIENT)
Dept: ENDOCRINOLOGY | Facility: CLINIC | Age: 39
End: 2019-09-16

## 2019-09-16 RX ORDER — LEVOTHYROXINE SODIUM 50 UG/1
50 CAPSULE ORAL DAILY
Qty: 30 CAPSULE | Refills: 6 | Status: SHIPPED | OUTPATIENT
Start: 2019-09-16 | End: 2019-09-20 | Stop reason: SDUPTHER

## 2019-09-16 NOTE — TELEPHONE ENCOUNTER
Pt asking for refill for Tirosint (appears 50 mg per recent messages)  Do not see on Medlist  Please advise

## 2019-09-17 ENCOUNTER — OFFICE VISIT (OUTPATIENT)
Dept: OBSTETRICS AND GYNECOLOGY | Facility: CLINIC | Age: 39
End: 2019-09-17
Payer: COMMERCIAL

## 2019-09-17 VITALS
DIASTOLIC BLOOD PRESSURE: 64 MMHG | BODY MASS INDEX: 21.41 KG/M2 | SYSTOLIC BLOOD PRESSURE: 104 MMHG | WEIGHT: 113.31 LBS

## 2019-09-17 DIAGNOSIS — N76.1 SUBACUTE VAGINITIS: Primary | ICD-10-CM

## 2019-09-17 DIAGNOSIS — R10.2 SUPRAPUBIC PRESSURE: ICD-10-CM

## 2019-09-17 LAB
B-HCG UR QL: NEGATIVE
CTP QC/QA: YES

## 2019-09-17 PROCEDURE — 3008F PR BODY MASS INDEX (BMI) DOCUMENTED: ICD-10-PCS | Mod: CPTII,S$GLB,, | Performed by: NURSE PRACTITIONER

## 2019-09-17 PROCEDURE — 87481 CANDIDA DNA AMP PROBE: CPT | Mod: 59

## 2019-09-17 PROCEDURE — 99213 OFFICE O/P EST LOW 20 MIN: CPT | Mod: S$GLB,,, | Performed by: NURSE PRACTITIONER

## 2019-09-17 PROCEDURE — 3008F BODY MASS INDEX DOCD: CPT | Mod: CPTII,S$GLB,, | Performed by: NURSE PRACTITIONER

## 2019-09-17 PROCEDURE — 87088 URINE BACTERIA CULTURE: CPT

## 2019-09-17 PROCEDURE — 87186 SC STD MICRODIL/AGAR DIL: CPT

## 2019-09-17 PROCEDURE — 81025 POCT URINE PREGNANCY: ICD-10-PCS | Mod: S$GLB,,, | Performed by: NURSE PRACTITIONER

## 2019-09-17 PROCEDURE — 87077 CULTURE AEROBIC IDENTIFY: CPT

## 2019-09-17 PROCEDURE — 87086 URINE CULTURE/COLONY COUNT: CPT

## 2019-09-17 PROCEDURE — 99999 PR PBB SHADOW E&M-EST. PATIENT-LVL II: CPT | Mod: PBBFAC,,, | Performed by: NURSE PRACTITIONER

## 2019-09-17 PROCEDURE — 99213 PR OFFICE/OUTPT VISIT, EST, LEVL III, 20-29 MIN: ICD-10-PCS | Mod: S$GLB,,, | Performed by: NURSE PRACTITIONER

## 2019-09-17 PROCEDURE — 87801 DETECT AGNT MULT DNA AMPLI: CPT

## 2019-09-17 PROCEDURE — 81025 URINE PREGNANCY TEST: CPT | Mod: S$GLB,,, | Performed by: NURSE PRACTITIONER

## 2019-09-17 PROCEDURE — 99999 PR PBB SHADOW E&M-EST. PATIENT-LVL II: ICD-10-PCS | Mod: PBBFAC,,, | Performed by: NURSE PRACTITIONER

## 2019-09-17 RX ORDER — FLUCONAZOLE 150 MG/1
150 TABLET ORAL ONCE
Qty: 2 TABLET | Refills: 3 | Status: SHIPPED | OUTPATIENT
Start: 2019-09-17 | End: 2019-09-17

## 2019-09-17 NOTE — PROGRESS NOTES
CC: Vaginal Discharge, suprapubic pressure     Juan Ramirez is a 38 y.o. female  presents with complaint of vaginal discharge for 1 week.  She denies itching.  denies odor.  She states the discharge is clear.  Alleviating factors: OTC Monistat. No new sexual partners.    [place].  Reports recurrent yeast infections after each visit.  She also c/o suprapubic pressure.     ROS:  GENERAL: No fever, chills, fatigability or weight loss.  VULVAR: No pain, no lesions and no itching.  VAGINAL: No relaxation, no itching, + discharge, no abnormal bleeding and no lesions.  ABDOMEN: No abdominal pain. Denies nausea. Denies vomiting. No diarrhea. No constipation  BREAST: Denies pain. No lumps. No discharge.  URINARY: No incontinence, no nocturia, no frequency and no dysuria + suprapubic pressure   CARDIOVASCULAR: No chest pain. No shortness of breath. No leg cramps.  NEUROLOGICAL: No headaches. No vision changes.    PHYSICAL EXAM:  VULVA: normal appearing vulva with no masses, tenderness or lesions   VAGINA: normal appearing vagina with normal color and discharge, no lesions   CERVIX: normal appearing cervix without discharge or lesions   UTERUS: uterus is normal size, shape, consistency and nontender   ADNEXA: normal adnexa in size, nontender and no masses    ASSESSMENT and PLAN:    ICD-10-CM ICD-9-CM    1. Subacute vaginitis N76.1 616.10 Vaginosis Screen by DNA Probe      fluconazole (DIFLUCAN) 150 MG Tab      boric acid (BORIC ACID) vaginal suppository   2. Suprapubic pressure R10.2 789.09 Urine culture      POCT Urine Pregnancy      POCT URINE DIPSTICK WITHOUT MICROSCOPE       Vaginosis cx  Diflucan  Boric Acid suppositories PRN   U dip is negative  UPT is negative   Urine culture       Patient was counseled today on vaginitis prevention including :  a. avoiding feminine products such as deoderant soaps, body wash, bubble bath, douches, scented toilet paper, deoderant  tampons or pads, feminine wipes, chronic pad use, etc.  b. avoiding other vulvovaginal irritants such as long hot baths, humidity, tight, synthetic clothing, chlorine and sitting around in wet bathing suits  c. wearing cotton underwear, avoiding thong underwear and no underwear to bed  d. taking showers instead of baths and use a hair dryer on cool setting afterwards to dry  e. wearing cotton to exercise and shower immediately after exercise and change clothes  f. using polyurethane condoms without spermicide if sexually active and symptoms are triggered by intercourse    FOLLOW UP: PRN lack of improvement.    Janae Oliveira, FNP-C

## 2019-09-18 ENCOUNTER — PATIENT MESSAGE (OUTPATIENT)
Dept: OBSTETRICS AND GYNECOLOGY | Facility: CLINIC | Age: 39
End: 2019-09-18

## 2019-09-18 LAB
BACTERIAL VAGINOSIS DNA: NEGATIVE
CANDIDA GLABRATA DNA: NEGATIVE
CANDIDA KRUSEI DNA: NEGATIVE
CANDIDA RRNA VAG QL PROBE: NEGATIVE
T VAGINALIS RRNA GENITAL QL PROBE: NEGATIVE

## 2019-09-19 ENCOUNTER — PATIENT MESSAGE (OUTPATIENT)
Dept: ENDOCRINOLOGY | Facility: CLINIC | Age: 39
End: 2019-09-19

## 2019-09-19 LAB — BACTERIA UR CULT: ABNORMAL

## 2019-09-20 ENCOUNTER — PATIENT MESSAGE (OUTPATIENT)
Dept: ENDOCRINOLOGY | Facility: CLINIC | Age: 39
End: 2019-09-20

## 2019-09-20 ENCOUNTER — TELEPHONE (OUTPATIENT)
Dept: OBSTETRICS AND GYNECOLOGY | Facility: CLINIC | Age: 39
End: 2019-09-20

## 2019-09-20 DIAGNOSIS — N39.0 E-COLI UTI: Primary | ICD-10-CM

## 2019-09-20 DIAGNOSIS — B96.20 E-COLI UTI: Primary | ICD-10-CM

## 2019-09-20 RX ORDER — NITROFURANTOIN 25; 75 MG/1; MG/1
100 CAPSULE ORAL 2 TIMES DAILY
Qty: 14 CAPSULE | Refills: 0 | Status: SHIPPED | OUTPATIENT
Start: 2019-09-20 | End: 2019-09-27

## 2019-09-20 RX ORDER — LEVOTHYROXINE SODIUM 50 UG/1
50 CAPSULE ORAL DAILY
Qty: 30 CAPSULE | Refills: 6 | Status: SHIPPED | OUTPATIENT
Start: 2019-09-20 | End: 2020-05-12

## 2019-09-20 NOTE — TELEPHONE ENCOUNTER
Called pt to discuss her urine culture revealed an ecoli UTI cfu > 100,000.  Discussed need for ABX to prevent pyelonephritis. Discussed UTI prevention.  Macrobid sent to the pharmacy.  Pt verbalized understanding.

## 2019-09-27 ENCOUNTER — PATIENT MESSAGE (OUTPATIENT)
Dept: OBSTETRICS AND GYNECOLOGY | Facility: CLINIC | Age: 39
End: 2019-09-27

## 2019-09-27 DIAGNOSIS — R39.9 UTI SYMPTOMS: Primary | ICD-10-CM

## 2019-09-28 ENCOUNTER — LAB VISIT (OUTPATIENT)
Dept: LAB | Facility: HOSPITAL | Age: 39
End: 2019-09-28
Attending: FAMILY MEDICINE
Payer: COMMERCIAL

## 2019-09-28 DIAGNOSIS — R39.9 UTI SYMPTOMS: ICD-10-CM

## 2019-09-28 PROCEDURE — 87088 URINE BACTERIA CULTURE: CPT

## 2019-09-28 PROCEDURE — 87086 URINE CULTURE/COLONY COUNT: CPT

## 2019-09-28 PROCEDURE — 87147 CULTURE TYPE IMMUNOLOGIC: CPT

## 2019-09-29 LAB — BACTERIA UR CULT: ABNORMAL

## 2019-09-30 ENCOUNTER — PATIENT MESSAGE (OUTPATIENT)
Dept: OBSTETRICS AND GYNECOLOGY | Facility: CLINIC | Age: 39
End: 2019-09-30

## 2019-10-04 ENCOUNTER — TELEPHONE (OUTPATIENT)
Dept: OBSTETRICS AND GYNECOLOGY | Facility: CLINIC | Age: 39
End: 2019-10-04

## 2019-10-04 NOTE — TELEPHONE ENCOUNTER
----- Message from Janae Oliveira NP sent at 10/4/2019  1:57 PM CDT -----  Contact: MICAELA FONG [[id number]]  Ok to see any MD for fertility consults.  Can give info for [facility]   ----- Message -----  From: Ophelia Galeano MA  Sent: 10/4/2019  12:30 PM CDT  To: Janae Oliveira NP        ----- Message -----  From: Delia Yg  Sent: 10/4/2019   9:30 AM CDT  To: Tee GARSIA Staff    [phone]    DILLON Oliveira referred this patient to see Dr. Hagan regarding her wanting to having her levels checked to see if she could get pregnant again. I scheduled the appointment with Dr. Hagan for 10/22, but I received a message from her stating that it was scheduled incorrectly because I had put fertility issues in the appointment notes and she asked me to reschedule this appointment with another provider.  At the time, I did not know this was a referral from DILLON Oliveira and I explained that I have transferred to a new position and did not know she did not see patients for this issue. I have cancelled the appointment for 10/22. Could DILLON Crowe please explain the reason for the referral to Dr. Hagan?  I believe there may have been a miscommunication.

## 2019-10-04 NOTE — TELEPHONE ENCOUNTER
Left voicemail for patient to contact office back. Called patient to inform Dr. Hagan isn't currently accepting new OB patients and will not be able to schedule a consult. She can see any MD for fertility consult, and also [facility].

## 2019-10-17 ENCOUNTER — OFFICE VISIT (OUTPATIENT)
Dept: OBSTETRICS AND GYNECOLOGY | Facility: CLINIC | Age: 39
End: 2019-10-17
Payer: COMMERCIAL

## 2019-10-17 VITALS
BODY MASS INDEX: 20.99 KG/M2 | WEIGHT: 111.13 LBS | DIASTOLIC BLOOD PRESSURE: 70 MMHG | SYSTOLIC BLOOD PRESSURE: 110 MMHG

## 2019-10-17 DIAGNOSIS — R10.2 SUPRAPUBIC PRESSURE: ICD-10-CM

## 2019-10-17 DIAGNOSIS — N64.4 BREAST PAIN, RIGHT: ICD-10-CM

## 2019-10-17 DIAGNOSIS — R10.2 PELVIC PAIN IN FEMALE: Primary | ICD-10-CM

## 2019-10-17 LAB
B-HCG UR QL: NEGATIVE
CTP QC/QA: YES

## 2019-10-17 PROCEDURE — 99213 OFFICE O/P EST LOW 20 MIN: CPT | Mod: S$GLB,,, | Performed by: NURSE PRACTITIONER

## 2019-10-17 PROCEDURE — 87147 CULTURE TYPE IMMUNOLOGIC: CPT

## 2019-10-17 PROCEDURE — 87088 URINE BACTERIA CULTURE: CPT

## 2019-10-17 PROCEDURE — 3008F BODY MASS INDEX DOCD: CPT | Mod: CPTII,S$GLB,, | Performed by: NURSE PRACTITIONER

## 2019-10-17 PROCEDURE — 87086 URINE CULTURE/COLONY COUNT: CPT

## 2019-10-17 PROCEDURE — 81025 URINE PREGNANCY TEST: CPT | Mod: S$GLB,,, | Performed by: NURSE PRACTITIONER

## 2019-10-17 PROCEDURE — 99999 PR PBB SHADOW E&M-EST. PATIENT-LVL III: ICD-10-PCS | Mod: PBBFAC,,, | Performed by: NURSE PRACTITIONER

## 2019-10-17 PROCEDURE — 99999 PR PBB SHADOW E&M-EST. PATIENT-LVL III: CPT | Mod: PBBFAC,,, | Performed by: NURSE PRACTITIONER

## 2019-10-17 PROCEDURE — 3008F PR BODY MASS INDEX (BMI) DOCUMENTED: ICD-10-PCS | Mod: CPTII,S$GLB,, | Performed by: NURSE PRACTITIONER

## 2019-10-17 PROCEDURE — 99213 PR OFFICE/OUTPT VISIT, EST, LEVL III, 20-29 MIN: ICD-10-PCS | Mod: S$GLB,,, | Performed by: NURSE PRACTITIONER

## 2019-10-17 PROCEDURE — 81025 POCT URINE PREGNANCY: ICD-10-PCS | Mod: S$GLB,,, | Performed by: NURSE PRACTITIONER

## 2019-10-17 NOTE — PROGRESS NOTES
CC: Pelvic pain    Pt is 38 y.o. here for evaluation of pelvic pain. The pain is described as sharp and shooting.  Pain is located in the LLQ area without radiation. Onset was gradual occurring 4 weeks ago. Symptoms have been unchanged since. Aggravating factors: none. Alleviating factors: none. Associated symptoms: suprapubic pressure. The patient denies diarrhea, dysuria, frequency, nausea and vomiting. Risk factors for pelvic/abdominal pain include prior ectopic pregnancy.  UPT is negative. Pt also c/o right breast pain to right breast.  Denies any new asymmetry, nipple DC or skin changes.        ROS:  GENERAL: Feeling well overall. Denies fever or chills.   SKIN: Denies rash or lesions.   HEAD: Denies head injury or headache.   NODES: Denies enlarged lymph nodes.   CHEST: Denies chest pain or shortness of breath.   CARDIOVASCULAR: Denies palpitations or left sided chest pain.   ABDOMEN: No abdominal pain, constipation, diarrhea, nausea, vomiting or rectal bleeding.   URINARY: No dysuria, hematuria, or burning on urination.  REPRODUCTIVE: See HPI.   BREASTS: Denies pain, lumps, or nipple discharge.   HEMATOLOGIC: No easy bruisability or excessive bleeding.   MUSCULOSKELETAL: Denies joint pain or swelling.   NEUROLOGIC: Denies syncope or weakness.   PSYCHIATRIC: Denies depression, anxiety or mood swings.    PE:   APPEARANCE: Well nourished, well developed, White female in no acute distress.  BREASTS: Symmetrical, no skin changes or visible lesions. + 3 cm palpable mass to  Right breast at 12 oc lock- freely movable/ tender to palpation. No nipple discharge or adenopathy bilaterally.  VULVA: No lesions. Normal external female genitalia.  URETHRAL MEATUS: Normal size and location, no lesions, no prolapse.  URETHRA: No masses, tenderness, or prolapse.  VAGINA: Moist. No lesions or lacerations noted. No abnormal discharge present. No odor present.   CERVIX: No lesions or discharge. No cervical motion tenderness.    UTERUS: Normal size, regular shape, mobile, non-tender.  ADNEXA: No tenderness. No fullness or masses palpated in the adnexal regions.   ANUS PERINEUM: Normal.        Diagnosis:  1. Pelvic pain in female    2. Breast pain, right    3. Suprapubic pressure        Plan:   UPT is negative  Urine culture  Pelvic US  Right breast mammo and US    Orders Placed This Encounter    Urine culture    US Pelvis Comp with Transvag NON-OB (xpd    Mammo Digital Diagnostic Right with Aime    US Breast Right Complete    POCT Urine Pregnancy                Follow-up PRN no resolution of symptoms.    Janae Oliveira, CARMITAP-C

## 2019-10-18 ENCOUNTER — HOSPITAL ENCOUNTER (OUTPATIENT)
Dept: RADIOLOGY | Facility: HOSPITAL | Age: 39
Discharge: HOME | End: 2019-10-18
Attending: NURSE PRACTITIONER
Payer: COMMERCIAL

## 2019-10-18 ENCOUNTER — TELEPHONE (OUTPATIENT)
Dept: OBSTETRICS AND GYNECOLOGY | Facility: CLINIC | Age: 39
End: 2019-10-18

## 2019-10-18 DIAGNOSIS — R10.2 PELVIC PAIN IN FEMALE: ICD-10-CM

## 2019-10-18 DIAGNOSIS — R10.2 SUPRAPUBIC PRESSURE: ICD-10-CM

## 2019-10-18 PROCEDURE — 76830 US PELVIS COMP WITH TRANSVAG NON-OB (XPD): ICD-10-PCS | Mod: 26,,, | Performed by: RADIOLOGY

## 2019-10-18 PROCEDURE — 76830 TRANSVAGINAL US NON-OB: CPT | Mod: TC,PN

## 2019-10-18 PROCEDURE — 76856 US PELVIS COMP WITH TRANSVAG NON-OB (XPD): ICD-10-PCS | Mod: 26,,, | Performed by: RADIOLOGY

## 2019-10-18 PROCEDURE — 76856 US EXAM PELVIC COMPLETE: CPT | Mod: 26,,, | Performed by: RADIOLOGY

## 2019-10-18 PROCEDURE — 76830 TRANSVAGINAL US NON-OB: CPT | Mod: 26,,, | Performed by: RADIOLOGY

## 2019-10-19 LAB — BACTERIA UR CULT: ABNORMAL

## 2019-10-21 ENCOUNTER — TELEPHONE (OUTPATIENT)
Dept: OBSTETRICS AND GYNECOLOGY | Facility: CLINIC | Age: 39
End: 2019-10-21

## 2019-10-21 DIAGNOSIS — N30.00 ACUTE CYSTITIS WITHOUT HEMATURIA: Primary | ICD-10-CM

## 2019-10-21 RX ORDER — PENICILLIN V POTASSIUM 500 MG/1
500 TABLET, FILM COATED ORAL 2 TIMES DAILY
Qty: 14 TABLET | Refills: 0 | Status: SHIPPED | OUTPATIENT
Start: 2019-10-21 | End: 2019-10-28

## 2019-10-23 ENCOUNTER — OFFICE VISIT (OUTPATIENT)
Dept: OBSTETRICS AND GYNECOLOGY | Facility: CLINIC | Age: 39
End: 2019-10-23
Attending: OBSTETRICS & GYNECOLOGY
Payer: COMMERCIAL

## 2019-10-23 VITALS
WEIGHT: 110.25 LBS | SYSTOLIC BLOOD PRESSURE: 112 MMHG | BODY MASS INDEX: 20.82 KG/M2 | DIASTOLIC BLOOD PRESSURE: 60 MMHG | HEIGHT: 61 IN

## 2019-10-23 DIAGNOSIS — Z31.41 FERTILITY TESTING: Primary | ICD-10-CM

## 2019-10-23 PROCEDURE — 99999 PR PBB SHADOW E&M-EST. PATIENT-LVL III: CPT | Mod: PBBFAC,,, | Performed by: OBSTETRICS & GYNECOLOGY

## 2019-10-23 PROCEDURE — 3008F PR BODY MASS INDEX (BMI) DOCUMENTED: ICD-10-PCS | Mod: CPTII,S$GLB,, | Performed by: OBSTETRICS & GYNECOLOGY

## 2019-10-23 PROCEDURE — 99999 PR PBB SHADOW E&M-EST. PATIENT-LVL III: ICD-10-PCS | Mod: PBBFAC,,, | Performed by: OBSTETRICS & GYNECOLOGY

## 2019-10-23 PROCEDURE — 3008F BODY MASS INDEX DOCD: CPT | Mod: CPTII,S$GLB,, | Performed by: OBSTETRICS & GYNECOLOGY

## 2019-10-23 PROCEDURE — 99213 PR OFFICE/OUTPT VISIT, EST, LEVL III, 20-29 MIN: ICD-10-PCS | Mod: S$GLB,,, | Performed by: OBSTETRICS & GYNECOLOGY

## 2019-10-23 PROCEDURE — 99213 OFFICE O/P EST LOW 20 MIN: CPT | Mod: S$GLB,,, | Performed by: OBSTETRICS & GYNECOLOGY

## 2019-10-23 NOTE — PROGRESS NOTES
"CC:trying for pregnancy    HPI:Juan Ramirez is a 39 yo female   With regular menses, she has a history of thyroid disease, now on tyrosint, and during the time that she was altering medications, she had slight alterations to cycle ranging from 25-31 days.  Partner lives in , has never had any children.  Has been tracking ovulation with OPKs.  The last few cycles, has had timed intercourse without pregnancy.  Has had positives every month except for October. AMH checked previously, was 5.3.  Has been trying for pregnancy x slightly less than 1 year.      ROS:  GENERAL: Feeling well overall. Denies fever or chills.   SKIN: Denies rash or lesions.   HEAD: Denies head injury or headache.   NODES: Denies enlarged lymph nodes.   CHEST: Denies chest pain or shortness of breath.   CARDIOVASCULAR: Denies palpitations or left sided chest pain.   ABDOMEN: No abdominal pain, constipation, diarrhea, nausea, vomiting or rectal bleeding.   URINARY: No dysuria, hematuria, or burning on urination.  REPRODUCTIVE: See HPI.   BREASTS: Denies pain, lumps, or nipple discharge.   HEMATOLOGIC: No easy bruisability or excessive bleeding.   MUSCULOSKELETAL: Denies joint pain or swelling.   NEUROLOGIC: Denies syncope or weakness.   PSYCHIATRIC: Denies depression, anxiety or mood swings.    PE:   /60   Ht 5' 1" (1.549 m)   Wt 50 kg (110 lb 3.7 oz)   LMP 09/26/2019 (Exact Date)   BMI 20.83 kg/m²     APPEARANCE: Well nourished, well developed, White female in no acute distress.  NODES: no cervical, supraclavicular, or inguinal lymphadenopathy        Diagnosis:  1. Fertility testing        Plan:     Orders Placed This Encounter    Progesterone    TSH    T4, free    T3, REVERSE         Lakshmi W Band, DO  "

## 2019-11-07 ENCOUNTER — PATIENT MESSAGE (OUTPATIENT)
Dept: OBSTETRICS AND GYNECOLOGY | Facility: CLINIC | Age: 39
End: 2019-11-07

## 2019-11-11 ENCOUNTER — HOSPITAL ENCOUNTER (OUTPATIENT)
Dept: RADIOLOGY | Facility: HOSPITAL | Age: 39
Discharge: HOME | End: 2019-11-11
Attending: NURSE PRACTITIONER
Payer: COMMERCIAL

## 2019-11-11 ENCOUNTER — TELEPHONE (OUTPATIENT)
Dept: OBSTETRICS AND GYNECOLOGY | Facility: CLINIC | Age: 39
End: 2019-11-11

## 2019-11-11 VITALS — WEIGHT: 110.25 LBS | BODY MASS INDEX: 20.82 KG/M2 | HEIGHT: 61 IN

## 2019-11-11 DIAGNOSIS — N64.4 BREAST PAIN, RIGHT: ICD-10-CM

## 2019-11-11 PROCEDURE — 77066 MAMMO DIGITAL DIAGNOSTIC BILAT WITH TOMOSYNTHESIS_CAD: ICD-10-PCS | Mod: 26,,, | Performed by: RADIOLOGY

## 2019-11-11 PROCEDURE — 77062 MAMMO DIGITAL DIAGNOSTIC BILAT WITH TOMOSYNTHESIS_CAD: ICD-10-PCS | Mod: 26,,, | Performed by: RADIOLOGY

## 2019-11-11 PROCEDURE — 77066 DX MAMMO INCL CAD BI: CPT | Mod: TC,PO

## 2019-11-11 PROCEDURE — 77062 BREAST TOMOSYNTHESIS BI: CPT | Mod: 26,,, | Performed by: RADIOLOGY

## 2019-11-11 PROCEDURE — 77066 DX MAMMO INCL CAD BI: CPT | Mod: 26,,, | Performed by: RADIOLOGY

## 2019-11-14 ENCOUNTER — LAB VISIT (OUTPATIENT)
Dept: LAB | Facility: HOSPITAL | Age: 39
End: 2019-11-14
Attending: OBSTETRICS & GYNECOLOGY
Payer: COMMERCIAL

## 2019-11-14 DIAGNOSIS — Z31.41 FERTILITY TESTING: ICD-10-CM

## 2019-11-14 LAB
PROGEST SERPL-MCNC: 5.6 NG/ML
T4 FREE SERPL-MCNC: 1.13 NG/DL (ref 0.71–1.51)
TSH SERPL DL<=0.005 MIU/L-ACNC: 2.19 UIU/ML (ref 0.4–4)

## 2019-11-14 PROCEDURE — 84482 T3 REVERSE: CPT

## 2019-11-14 PROCEDURE — 84144 ASSAY OF PROGESTERONE: CPT

## 2019-11-14 PROCEDURE — 84439 ASSAY OF FREE THYROXINE: CPT

## 2019-11-14 PROCEDURE — 84443 ASSAY THYROID STIM HORMONE: CPT

## 2019-11-14 PROCEDURE — 36415 COLL VENOUS BLD VENIPUNCTURE: CPT | Mod: PO

## 2019-11-19 LAB — T3REVERSE SERPL-MCNC: 19.2 NG/DL (ref 9–27)

## 2019-12-27 ENCOUNTER — PATIENT MESSAGE (OUTPATIENT)
Dept: OBSTETRICS AND GYNECOLOGY | Facility: CLINIC | Age: 39
End: 2019-12-27

## 2019-12-27 DIAGNOSIS — N39.0 URINARY TRACT INFECTION WITHOUT HEMATURIA, SITE UNSPECIFIED: Primary | ICD-10-CM

## 2019-12-28 ENCOUNTER — PATIENT MESSAGE (OUTPATIENT)
Dept: OBSTETRICS AND GYNECOLOGY | Facility: CLINIC | Age: 39
End: 2019-12-28

## 2019-12-31 ENCOUNTER — LAB VISIT (OUTPATIENT)
Dept: LAB | Facility: HOSPITAL | Age: 39
End: 2019-12-31
Attending: NURSE PRACTITIONER
Payer: COMMERCIAL

## 2019-12-31 DIAGNOSIS — N39.0 URINARY TRACT INFECTION WITHOUT HEMATURIA, SITE UNSPECIFIED: ICD-10-CM

## 2019-12-31 PROCEDURE — 87086 URINE CULTURE/COLONY COUNT: CPT

## 2020-01-01 LAB
BACTERIA UR CULT: NORMAL
BACTERIA UR CULT: NORMAL

## 2020-05-12 RX ORDER — LEVOTHYROXINE SODIUM 50 UG/1
CAPSULE ORAL
Qty: 30 CAPSULE | Refills: 6 | Status: SHIPPED | OUTPATIENT
Start: 2020-05-12 | End: 2020-10-08

## 2020-07-28 ENCOUNTER — TELEPHONE (OUTPATIENT)
Dept: RHEUMATOLOGY | Facility: CLINIC | Age: 40
End: 2020-07-28

## 2020-07-28 NOTE — TELEPHONE ENCOUNTER
Spoke to pt and she would like a letter written up addressing her autoimmune disease. States due to covid-19 she has been home and on unemployment states no one has asked her for anything but she would like to get something in writing and on paper so that she will have proof of her illnesses.

## 2020-07-28 NOTE — TELEPHONE ENCOUNTER
----- Message from Toney Monroy sent at 7/28/2020  1:37 PM CDT -----  Type: Needs Medical Advice  Who Called:  Patient    Best Call Back Number: [phone]  Additional Information: Patient states that she would like a callback regarding questions about having a letter written by the doctor.

## 2020-10-05 ENCOUNTER — PATIENT MESSAGE (OUTPATIENT)
Dept: ENDOCRINOLOGY | Facility: CLINIC | Age: 40
End: 2020-10-05

## 2020-10-05 DIAGNOSIS — E03.8 HYPOTHYROIDISM DUE TO HASHIMOTO'S THYROIDITIS: Primary | ICD-10-CM

## 2020-10-05 DIAGNOSIS — E06.3 HYPOTHYROIDISM DUE TO HASHIMOTO'S THYROIDITIS: Primary | ICD-10-CM

## 2020-10-06 ENCOUNTER — PATIENT MESSAGE (OUTPATIENT)
Dept: ENDOCRINOLOGY | Facility: CLINIC | Age: 40
End: 2020-10-06

## 2020-10-06 ENCOUNTER — LAB VISIT (OUTPATIENT)
Dept: LAB | Facility: HOSPITAL | Age: 40
End: 2020-10-06
Attending: INTERNAL MEDICINE
Payer: OTHER GOVERNMENT

## 2020-10-06 DIAGNOSIS — E06.3 HYPOTHYROIDISM DUE TO HASHIMOTO'S THYROIDITIS: ICD-10-CM

## 2020-10-06 DIAGNOSIS — E03.8 HYPOTHYROIDISM DUE TO HASHIMOTO'S THYROIDITIS: ICD-10-CM

## 2020-10-06 DIAGNOSIS — E03.8 HYPOTHYROIDISM DUE TO HASHIMOTO'S THYROIDITIS: Primary | ICD-10-CM

## 2020-10-06 DIAGNOSIS — E06.3 HYPOTHYROIDISM DUE TO HASHIMOTO'S THYROIDITIS: Primary | ICD-10-CM

## 2020-10-06 PROCEDURE — 84439 ASSAY OF FREE THYROXINE: CPT

## 2020-10-06 PROCEDURE — 84443 ASSAY THYROID STIM HORMONE: CPT

## 2020-10-06 PROCEDURE — 36415 COLL VENOUS BLD VENIPUNCTURE: CPT | Mod: PO

## 2020-10-06 PROCEDURE — 86376 MICROSOMAL ANTIBODY EACH: CPT

## 2020-10-06 PROCEDURE — 84480 ASSAY TRIIODOTHYRONINE (T3): CPT

## 2020-10-06 NOTE — TELEPHONE ENCOUNTER
Can add TPO. However, we dont recommend checking them as they are not a very accurate test and can fluctuate. The testing we do for Ab are not intended to be used for that purpose like we do with other Ab    If would like to discuss in the future, will need to make an appt

## 2020-10-07 LAB
T3 SERPL-MCNC: 91 NG/DL (ref 60–180)
T4 FREE SERPL-MCNC: 1.11 NG/DL (ref 0.71–1.51)
THYROPEROXIDASE IGG SERPL-ACNC: 101.5 IU/ML
TSH SERPL DL<=0.005 MIU/L-ACNC: 3.84 UIU/ML (ref 0.4–4)

## 2020-10-08 ENCOUNTER — PATIENT MESSAGE (OUTPATIENT)
Dept: ENDOCRINOLOGY | Facility: CLINIC | Age: 40
End: 2020-10-08

## 2020-10-08 DIAGNOSIS — E03.8 HYPOTHYROIDISM DUE TO HASHIMOTO'S THYROIDITIS: Primary | ICD-10-CM

## 2020-10-08 DIAGNOSIS — E06.3 HYPOTHYROIDISM DUE TO HASHIMOTO'S THYROIDITIS: Primary | ICD-10-CM

## 2020-10-08 RX ORDER — LEVOTHYROXINE SODIUM 75 UG/1
75 CAPSULE ORAL DAILY
Qty: 30 CAPSULE | Refills: 6 | Status: SHIPPED | OUTPATIENT
Start: 2020-10-08 | End: 2021-02-22

## 2020-11-19 ENCOUNTER — APPOINTMENT (OUTPATIENT)
Dept: LAB | Facility: HOSPITAL | Age: 40
End: 2020-11-19
Attending: INTERNAL MEDICINE
Payer: OTHER GOVERNMENT

## 2020-12-07 ENCOUNTER — PATIENT MESSAGE (OUTPATIENT)
Dept: ENDOCRINOLOGY | Facility: CLINIC | Age: 40
End: 2020-12-07

## 2021-01-07 ENCOUNTER — LAB VISIT (OUTPATIENT)
Dept: PRIMARY CARE CLINIC | Facility: OTHER | Age: 41
End: 2021-01-07
Attending: INTERNAL MEDICINE
Payer: OTHER GOVERNMENT

## 2021-01-07 DIAGNOSIS — Z03.818 ENCOUNTER FOR OBSERVATION FOR SUSPECTED EXPOSURE TO OTHER BIOLOGICAL AGENTS RULED OUT: ICD-10-CM

## 2021-01-07 PROCEDURE — U0003 INFECTIOUS AGENT DETECTION BY NUCLEIC ACID (DNA OR RNA); SEVERE ACUTE RESPIRATORY SYNDROME CORONAVIRUS 2 (SARS-COV-2) (CORONAVIRUS DISEASE [COVID-19]), AMPLIFIED PROBE TECHNIQUE, MAKING USE OF HIGH THROUGHPUT TECHNOLOGIES AS DESCRIBED BY CMS-2020-01-R: HCPCS

## 2021-01-08 DIAGNOSIS — U07.1 COVID-19 VIRUS DETECTED: ICD-10-CM

## 2021-01-08 LAB — SARS-COV-2 RNA RESP QL NAA+PROBE: DETECTED

## 2021-01-09 ENCOUNTER — TELEPHONE (OUTPATIENT)
Dept: PRIMARY CARE CLINIC | Facility: CLINIC | Age: 41
End: 2021-01-09

## 2021-01-19 ENCOUNTER — HOSPITAL ENCOUNTER (EMERGENCY)
Facility: HOSPITAL | Age: 41
Discharge: HOME | End: 2021-01-19
Attending: EMERGENCY MEDICINE
Payer: OTHER GOVERNMENT

## 2021-01-19 VITALS
OXYGEN SATURATION: 99 % | HEIGHT: 61 IN | SYSTOLIC BLOOD PRESSURE: 102 MMHG | WEIGHT: 109.69 LBS | TEMPERATURE: 99 F | RESPIRATION RATE: 18 BRPM | HEART RATE: 68 BPM | DIASTOLIC BLOOD PRESSURE: 70 MMHG | BODY MASS INDEX: 20.71 KG/M2

## 2021-01-19 DIAGNOSIS — R42 DIZZINESS: Primary | ICD-10-CM

## 2021-01-19 DIAGNOSIS — U07.1 COVID-19: ICD-10-CM

## 2021-01-19 DIAGNOSIS — R07.9 CHEST PAIN: ICD-10-CM

## 2021-01-19 LAB
ALBUMIN SERPL BCP-MCNC: 3.4 G/DL (ref 3.5–5.2)
ALP SERPL-CCNC: 54 U/L (ref 55–135)
ALT SERPL W/O P-5'-P-CCNC: 15 U/L (ref 10–44)
ANION GAP SERPL CALC-SCNC: 10 MMOL/L (ref 8–16)
AST SERPL-CCNC: 16 U/L (ref 10–40)
BASOPHILS # BLD AUTO: 0.02 K/UL (ref 0–0.2)
BASOPHILS NFR BLD: 0.4 % (ref 0–1.9)
BILIRUB SERPL-MCNC: 0.3 MG/DL (ref 0.1–1)
BUN SERPL-MCNC: 12 MG/DL (ref 6–20)
CALCIUM SERPL-MCNC: 8.6 MG/DL (ref 8.7–10.5)
CHLORIDE SERPL-SCNC: 103 MMOL/L (ref 95–110)
CO2 SERPL-SCNC: 27 MMOL/L (ref 23–29)
CREAT SERPL-MCNC: 0.6 MG/DL (ref 0.5–1.4)
DIFFERENTIAL METHOD: ABNORMAL
EOSINOPHIL # BLD AUTO: 0 K/UL (ref 0–0.5)
EOSINOPHIL NFR BLD: 0.5 % (ref 0–8)
ERYTHROCYTE [DISTWIDTH] IN BLOOD BY AUTOMATED COUNT: 11.4 % (ref 11.5–14.5)
EST. GFR  (AFRICAN AMERICAN): >60 ML/MIN/1.73 M^2
EST. GFR  (NON AFRICAN AMERICAN): >60 ML/MIN/1.73 M^2
GLUCOSE SERPL-MCNC: 83 MG/DL (ref 70–110)
HCT VFR BLD AUTO: 41.2 % (ref 37–48.5)
HGB BLD-MCNC: 13.2 G/DL (ref 12–16)
IMM GRANULOCYTES # BLD AUTO: 0.01 K/UL (ref 0–0.04)
IMM GRANULOCYTES NFR BLD AUTO: 0.2 % (ref 0–0.5)
LYMPHOCYTES # BLD AUTO: 2.5 K/UL (ref 1–4.8)
LYMPHOCYTES NFR BLD: 44.6 % (ref 18–48)
MCH RBC QN AUTO: 30.5 PG (ref 27–31)
MCHC RBC AUTO-ENTMCNC: 32 G/DL (ref 32–36)
MCV RBC AUTO: 95 FL (ref 82–98)
MONOCYTES # BLD AUTO: 0.5 K/UL (ref 0.3–1)
MONOCYTES NFR BLD: 9.1 % (ref 4–15)
NEUTROPHILS # BLD AUTO: 2.5 K/UL (ref 1.8–7.7)
NEUTROPHILS NFR BLD: 45.2 % (ref 38–73)
NRBC BLD-RTO: 0 /100 WBC
PLATELET # BLD AUTO: 319 K/UL (ref 150–350)
PMV BLD AUTO: 10.2 FL (ref 9.2–12.9)
POTASSIUM SERPL-SCNC: 4.5 MMOL/L (ref 3.5–5.1)
PROT SERPL-MCNC: 7.1 G/DL (ref 6–8.4)
RBC # BLD AUTO: 4.33 M/UL (ref 4–5.4)
SODIUM SERPL-SCNC: 140 MMOL/L (ref 136–145)
TSH SERPL DL<=0.005 MIU/L-ACNC: 2.34 UIU/ML (ref 0.4–4)
WBC # BLD AUTO: 5.51 K/UL (ref 3.9–12.7)

## 2021-01-19 PROCEDURE — 93005 ELECTROCARDIOGRAM TRACING: CPT

## 2021-01-19 PROCEDURE — 84443 ASSAY THYROID STIM HORMONE: CPT

## 2021-01-19 PROCEDURE — 93010 EKG 12-LEAD: ICD-10-PCS | Mod: ,,, | Performed by: INTERNAL MEDICINE

## 2021-01-19 PROCEDURE — 93010 ELECTROCARDIOGRAM REPORT: CPT | Mod: ,,, | Performed by: INTERNAL MEDICINE

## 2021-01-19 PROCEDURE — 99285 EMERGENCY DEPT VISIT HI MDM: CPT | Mod: 25

## 2021-01-19 PROCEDURE — 80053 COMPREHEN METABOLIC PANEL: CPT

## 2021-01-19 PROCEDURE — 85025 COMPLETE CBC W/AUTO DIFF WBC: CPT

## 2021-01-19 PROCEDURE — 36415 COLL VENOUS BLD VENIPUNCTURE: CPT

## 2021-02-08 ENCOUNTER — PATIENT MESSAGE (OUTPATIENT)
Dept: ENDOCRINOLOGY | Facility: CLINIC | Age: 41
End: 2021-02-08

## 2021-02-09 ENCOUNTER — TELEPHONE (OUTPATIENT)
Dept: RHEUMATOLOGY | Facility: CLINIC | Age: 41
End: 2021-02-09

## 2021-02-10 ENCOUNTER — PATIENT MESSAGE (OUTPATIENT)
Dept: ENDOCRINOLOGY | Facility: CLINIC | Age: 41
End: 2021-02-10

## 2021-02-11 ENCOUNTER — PATIENT MESSAGE (OUTPATIENT)
Dept: OBSTETRICS AND GYNECOLOGY | Facility: CLINIC | Age: 41
End: 2021-02-11

## 2021-02-11 DIAGNOSIS — Z31.89 ENCOUNTER FOR FERTILITY PLANNING: Primary | ICD-10-CM

## 2021-02-12 ENCOUNTER — LAB VISIT (OUTPATIENT)
Dept: LAB | Facility: HOSPITAL | Age: 41
End: 2021-02-12
Attending: OBSTETRICS & GYNECOLOGY
Payer: MEDICAID

## 2021-02-12 DIAGNOSIS — Z31.89 ENCOUNTER FOR FERTILITY PLANNING: ICD-10-CM

## 2021-02-12 LAB
ESTRADIOL SERPL-MCNC: 52 PG/ML
FSH SERPL-ACNC: 6.6 MIU/ML
LH SERPL-ACNC: 3.6 MIU/ML

## 2021-02-12 PROCEDURE — 83520 IMMUNOASSAY QUANT NOS NONAB: CPT

## 2021-02-12 PROCEDURE — 83002 ASSAY OF GONADOTROPIN (LH): CPT

## 2021-02-12 PROCEDURE — 82670 ASSAY OF TOTAL ESTRADIOL: CPT

## 2021-02-12 PROCEDURE — 83001 ASSAY OF GONADOTROPIN (FSH): CPT

## 2021-02-12 PROCEDURE — 36415 COLL VENOUS BLD VENIPUNCTURE: CPT | Mod: PO

## 2021-02-18 ENCOUNTER — PATIENT MESSAGE (OUTPATIENT)
Dept: ENDOCRINOLOGY | Facility: CLINIC | Age: 41
End: 2021-02-18

## 2021-02-18 ENCOUNTER — PATIENT MESSAGE (OUTPATIENT)
Dept: OBSTETRICS AND GYNECOLOGY | Facility: CLINIC | Age: 41
End: 2021-02-18

## 2021-02-18 DIAGNOSIS — E03.8 HYPOTHYROIDISM DUE TO HASHIMOTO'S THYROIDITIS: Primary | ICD-10-CM

## 2021-02-18 DIAGNOSIS — E06.3 HYPOTHYROIDISM DUE TO HASHIMOTO'S THYROIDITIS: Primary | ICD-10-CM

## 2021-02-18 DIAGNOSIS — Z31.41 FERTILITY TESTING: Primary | ICD-10-CM

## 2021-02-19 ENCOUNTER — TELEPHONE (OUTPATIENT)
Dept: OBSTETRICS AND GYNECOLOGY | Facility: CLINIC | Age: 41
End: 2021-02-19

## 2021-02-19 ENCOUNTER — LAB VISIT (OUTPATIENT)
Dept: LAB | Facility: HOSPITAL | Age: 41
End: 2021-02-19
Attending: INTERNAL MEDICINE
Payer: MEDICAID

## 2021-02-19 ENCOUNTER — PATIENT MESSAGE (OUTPATIENT)
Dept: OBSTETRICS AND GYNECOLOGY | Facility: CLINIC | Age: 41
End: 2021-02-19

## 2021-02-19 DIAGNOSIS — Z31.89 ENCOUNTER FOR FERTILITY PLANNING: ICD-10-CM

## 2021-02-19 DIAGNOSIS — E03.8 HYPOTHYROIDISM DUE TO HASHIMOTO'S THYROIDITIS: ICD-10-CM

## 2021-02-19 DIAGNOSIS — Z31.41 FERTILITY TESTING: Primary | ICD-10-CM

## 2021-02-19 DIAGNOSIS — E06.3 HYPOTHYROIDISM DUE TO HASHIMOTO'S THYROIDITIS: ICD-10-CM

## 2021-02-19 LAB
T4 FREE SERPL-MCNC: 1.52 NG/DL (ref 0.71–1.51)
TSH SERPL DL<=0.005 MIU/L-ACNC: 0.65 UIU/ML (ref 0.4–4)

## 2021-02-19 PROCEDURE — 84439 ASSAY OF FREE THYROXINE: CPT

## 2021-02-19 PROCEDURE — 84443 ASSAY THYROID STIM HORMONE: CPT

## 2021-02-19 PROCEDURE — 36415 COLL VENOUS BLD VENIPUNCTURE: CPT | Mod: PO

## 2021-02-22 ENCOUNTER — TELEPHONE (OUTPATIENT)
Dept: RHEUMATOLOGY | Facility: CLINIC | Age: 41
End: 2021-02-22

## 2021-02-22 ENCOUNTER — PATIENT MESSAGE (OUTPATIENT)
Dept: ENDOCRINOLOGY | Facility: CLINIC | Age: 41
End: 2021-02-22

## 2021-02-22 DIAGNOSIS — E03.8 HYPOTHYROIDISM DUE TO HASHIMOTO'S THYROIDITIS: Primary | ICD-10-CM

## 2021-02-22 DIAGNOSIS — E06.3 HYPOTHYROIDISM DUE TO HASHIMOTO'S THYROIDITIS: Primary | ICD-10-CM

## 2021-02-22 LAB — MIS SERPL-MCNC: NORMAL NG/ML

## 2021-02-22 RX ORDER — LEVOTHYROXINE SODIUM 75 UG/1
CAPSULE ORAL
Qty: 30 CAPSULE | Refills: 6 | Status: SHIPPED | OUTPATIENT
Start: 2021-02-22 | End: 2021-02-23

## 2021-02-23 ENCOUNTER — TELEPHONE (OUTPATIENT)
Dept: ENDOCRINOLOGY | Facility: CLINIC | Age: 41
End: 2021-02-23

## 2021-02-23 ENCOUNTER — PATIENT MESSAGE (OUTPATIENT)
Dept: OBSTETRICS AND GYNECOLOGY | Facility: CLINIC | Age: 41
End: 2021-02-23

## 2021-02-23 RX ORDER — LEVOTHYROXINE SODIUM 13 UG/1
CAPSULE ORAL
Qty: 30 CAPSULE | Refills: 6 | Status: SHIPPED | OUTPATIENT
Start: 2021-02-23 | End: 2021-09-17 | Stop reason: SDUPTHER

## 2021-02-23 RX ORDER — LEVOTHYROXINE SODIUM 50 UG/1
CAPSULE ORAL
Qty: 30 CAPSULE | Refills: 6 | Status: SHIPPED | OUTPATIENT
Start: 2021-02-23 | End: 2021-09-17 | Stop reason: SDUPTHER

## 2021-02-24 ENCOUNTER — PATIENT MESSAGE (OUTPATIENT)
Dept: OBSTETRICS AND GYNECOLOGY | Facility: CLINIC | Age: 41
End: 2021-02-24

## 2021-02-24 ENCOUNTER — TELEPHONE (OUTPATIENT)
Dept: FAMILY MEDICINE | Facility: CLINIC | Age: 41
End: 2021-02-24

## 2021-02-24 ENCOUNTER — PATIENT MESSAGE (OUTPATIENT)
Dept: RHEUMATOLOGY | Facility: CLINIC | Age: 41
End: 2021-02-24

## 2021-02-24 ENCOUNTER — TELEPHONE (OUTPATIENT)
Dept: OBSTETRICS AND GYNECOLOGY | Facility: CLINIC | Age: 41
End: 2021-02-24

## 2021-02-24 ENCOUNTER — TELEPHONE (OUTPATIENT)
Dept: ENDOCRINOLOGY | Facility: CLINIC | Age: 41
End: 2021-02-24

## 2021-02-24 DIAGNOSIS — Z31.41 FERTILITY TESTING: Primary | ICD-10-CM

## 2021-02-26 ENCOUNTER — TELEPHONE (OUTPATIENT)
Dept: OBSTETRICS AND GYNECOLOGY | Facility: CLINIC | Age: 41
End: 2021-02-26

## 2021-03-02 ENCOUNTER — TELEPHONE (OUTPATIENT)
Dept: RHEUMATOLOGY | Facility: CLINIC | Age: 41
End: 2021-03-02

## 2021-03-02 ENCOUNTER — PATIENT MESSAGE (OUTPATIENT)
Dept: RHEUMATOLOGY | Facility: CLINIC | Age: 41
End: 2021-03-02

## 2021-03-02 ENCOUNTER — OFFICE VISIT (OUTPATIENT)
Dept: FAMILY MEDICINE | Facility: CLINIC | Age: 41
End: 2021-03-02
Payer: MEDICAID

## 2021-03-02 ENCOUNTER — HOSPITAL ENCOUNTER (OUTPATIENT)
Dept: RADIOLOGY | Facility: HOSPITAL | Age: 41
Discharge: HOME | End: 2021-03-02
Attending: FAMILY MEDICINE
Payer: MEDICAID

## 2021-03-02 VITALS
OXYGEN SATURATION: 98 % | WEIGHT: 111.31 LBS | DIASTOLIC BLOOD PRESSURE: 68 MMHG | HEART RATE: 68 BPM | HEIGHT: 61 IN | BODY MASS INDEX: 21.02 KG/M2 | TEMPERATURE: 99 F | SYSTOLIC BLOOD PRESSURE: 108 MMHG

## 2021-03-02 DIAGNOSIS — Z12.39 ENCOUNTER FOR BREAST CANCER SCREENING USING NON-MAMMOGRAM MODALITY: ICD-10-CM

## 2021-03-02 DIAGNOSIS — E06.3 HYPOTHYROIDISM DUE TO HASHIMOTO'S THYROIDITIS: ICD-10-CM

## 2021-03-02 DIAGNOSIS — Z00.01 ANNUAL VISIT FOR GENERAL ADULT MEDICAL EXAMINATION WITH ABNORMAL FINDINGS: Primary | ICD-10-CM

## 2021-03-02 DIAGNOSIS — E03.8 HYPOTHYROIDISM DUE TO HASHIMOTO'S THYROIDITIS: ICD-10-CM

## 2021-03-02 DIAGNOSIS — Z00.01 ANNUAL VISIT FOR GENERAL ADULT MEDICAL EXAMINATION WITH ABNORMAL FINDINGS: ICD-10-CM

## 2021-03-02 DIAGNOSIS — G47.09 OTHER INSOMNIA: ICD-10-CM

## 2021-03-02 PROCEDURE — 99999 PR PBB SHADOW E&M-EST. PATIENT-LVL IV: ICD-10-PCS | Mod: PBBFAC,,, | Performed by: FAMILY MEDICINE

## 2021-03-02 PROCEDURE — 99214 OFFICE O/P EST MOD 30 MIN: CPT | Mod: PBBFAC,PO | Performed by: FAMILY MEDICINE

## 2021-03-02 PROCEDURE — 99999 PR PBB SHADOW E&M-EST. PATIENT-LVL IV: CPT | Mod: PBBFAC,,, | Performed by: FAMILY MEDICINE

## 2021-03-02 PROCEDURE — 77067 SCR MAMMO BI INCL CAD: CPT | Mod: TC,PO

## 2021-03-02 PROCEDURE — 77067 SCR MAMMO BI INCL CAD: CPT | Mod: 26,,, | Performed by: RADIOLOGY

## 2021-03-02 PROCEDURE — 77063 MAMMO DIGITAL SCREENING BILAT WITH TOMO: ICD-10-PCS | Mod: 26,,, | Performed by: RADIOLOGY

## 2021-03-02 PROCEDURE — 99396 PR PREVENTIVE VISIT,EST,40-64: ICD-10-PCS | Mod: S$PBB,,, | Performed by: FAMILY MEDICINE

## 2021-03-02 PROCEDURE — 99396 PREV VISIT EST AGE 40-64: CPT | Mod: S$PBB,,, | Performed by: FAMILY MEDICINE

## 2021-03-02 PROCEDURE — 77063 BREAST TOMOSYNTHESIS BI: CPT | Mod: 26,,, | Performed by: RADIOLOGY

## 2021-03-02 PROCEDURE — 77067 MAMMO DIGITAL SCREENING BILAT WITH TOMO: ICD-10-PCS | Mod: 26,,, | Performed by: RADIOLOGY

## 2021-03-02 RX ORDER — LEVOTHYROXINE SODIUM 75 UG/1
CAPSULE ORAL
COMMUNITY
Start: 2021-03-01 | End: 2021-04-09 | Stop reason: SDUPTHER

## 2021-03-08 ENCOUNTER — PATIENT MESSAGE (OUTPATIENT)
Dept: OBSTETRICS AND GYNECOLOGY | Facility: CLINIC | Age: 41
End: 2021-03-08

## 2021-03-09 ENCOUNTER — PATIENT MESSAGE (OUTPATIENT)
Dept: OBSTETRICS AND GYNECOLOGY | Facility: CLINIC | Age: 41
End: 2021-03-09

## 2021-03-10 ENCOUNTER — TELEPHONE (OUTPATIENT)
Dept: FAMILY MEDICINE | Facility: CLINIC | Age: 41
End: 2021-03-10

## 2021-03-10 DIAGNOSIS — Z00.01 ANNUAL VISIT FOR GENERAL ADULT MEDICAL EXAMINATION WITH ABNORMAL FINDINGS: Primary | ICD-10-CM

## 2021-03-11 ENCOUNTER — LAB VISIT (OUTPATIENT)
Dept: LAB | Facility: HOSPITAL | Age: 41
End: 2021-03-11
Attending: FAMILY MEDICINE
Payer: MEDICAID

## 2021-03-11 DIAGNOSIS — Z00.01 ANNUAL VISIT FOR GENERAL ADULT MEDICAL EXAMINATION WITH ABNORMAL FINDINGS: ICD-10-CM

## 2021-03-11 LAB
BASOPHILS # BLD AUTO: 0.06 K/UL (ref 0–0.2)
BASOPHILS NFR BLD: 1.1 % (ref 0–1.9)
DIFFERENTIAL METHOD: ABNORMAL
EOSINOPHIL # BLD AUTO: 0.2 K/UL (ref 0–0.5)
EOSINOPHIL NFR BLD: 3.4 % (ref 0–8)
ERYTHROCYTE [DISTWIDTH] IN BLOOD BY AUTOMATED COUNT: 12.9 % (ref 11.5–14.5)
HCT VFR BLD AUTO: 41.8 % (ref 37–48.5)
HGB BLD-MCNC: 13.3 G/DL (ref 12–16)
IMM GRANULOCYTES # BLD AUTO: 0.01 K/UL (ref 0–0.04)
IMM GRANULOCYTES NFR BLD AUTO: 0.2 % (ref 0–0.5)
LYMPHOCYTES # BLD AUTO: 1.6 K/UL (ref 1–4.8)
LYMPHOCYTES NFR BLD: 30.5 % (ref 18–48)
MCH RBC QN AUTO: 30.6 PG (ref 27–31)
MCHC RBC AUTO-ENTMCNC: 31.8 G/DL (ref 32–36)
MCV RBC AUTO: 96 FL (ref 82–98)
MONOCYTES # BLD AUTO: 0.3 K/UL (ref 0.3–1)
MONOCYTES NFR BLD: 5.5 % (ref 4–15)
NEUTROPHILS # BLD AUTO: 3.1 K/UL (ref 1.8–7.7)
NEUTROPHILS NFR BLD: 59.3 % (ref 38–73)
NRBC BLD-RTO: 0 /100 WBC
PLATELET # BLD AUTO: 234 K/UL (ref 150–350)
PMV BLD AUTO: 11.9 FL (ref 9.2–12.9)
RBC # BLD AUTO: 4.35 M/UL (ref 4–5.4)
WBC # BLD AUTO: 5.24 K/UL (ref 3.9–12.7)

## 2021-03-11 PROCEDURE — 80061 LIPID PANEL: CPT | Performed by: FAMILY MEDICINE

## 2021-03-11 PROCEDURE — 82306 VITAMIN D 25 HYDROXY: CPT | Performed by: FAMILY MEDICINE

## 2021-03-11 PROCEDURE — 83516 IMMUNOASSAY NONANTIBODY: CPT | Mod: 59 | Performed by: FAMILY MEDICINE

## 2021-03-11 PROCEDURE — 82746 ASSAY OF FOLIC ACID SERUM: CPT | Performed by: FAMILY MEDICINE

## 2021-03-11 PROCEDURE — 80053 COMPREHEN METABOLIC PANEL: CPT | Performed by: FAMILY MEDICINE

## 2021-03-11 PROCEDURE — 83540 ASSAY OF IRON: CPT | Performed by: FAMILY MEDICINE

## 2021-03-11 PROCEDURE — 85025 COMPLETE CBC W/AUTO DIFF WBC: CPT | Performed by: FAMILY MEDICINE

## 2021-03-11 PROCEDURE — 82607 VITAMIN B-12: CPT | Performed by: FAMILY MEDICINE

## 2021-03-11 PROCEDURE — 36415 COLL VENOUS BLD VENIPUNCTURE: CPT | Mod: PO | Performed by: FAMILY MEDICINE

## 2021-03-12 LAB
25(OH)D3+25(OH)D2 SERPL-MCNC: 30 NG/ML (ref 30–96)
ALBUMIN SERPL BCP-MCNC: 3.8 G/DL (ref 3.5–5.2)
ALP SERPL-CCNC: 37 U/L (ref 55–135)
ALT SERPL W/O P-5'-P-CCNC: 12 U/L (ref 10–44)
ANION GAP SERPL CALC-SCNC: 7 MMOL/L (ref 8–16)
AST SERPL-CCNC: 16 U/L (ref 10–40)
BILIRUB SERPL-MCNC: 0.7 MG/DL (ref 0.1–1)
BUN SERPL-MCNC: 11 MG/DL (ref 6–20)
CALCIUM SERPL-MCNC: 9 MG/DL (ref 8.7–10.5)
CHLORIDE SERPL-SCNC: 108 MMOL/L (ref 95–110)
CHOLEST SERPL-MCNC: 221 MG/DL (ref 120–199)
CHOLEST/HDLC SERPL: 3.3 {RATIO} (ref 2–5)
CO2 SERPL-SCNC: 26 MMOL/L (ref 23–29)
CREAT SERPL-MCNC: 0.7 MG/DL (ref 0.5–1.4)
EST. GFR  (AFRICAN AMERICAN): >60 ML/MIN/1.73 M^2
EST. GFR  (NON AFRICAN AMERICAN): >60 ML/MIN/1.73 M^2
FOLATE SERPL-MCNC: 16 NG/ML (ref 4–24)
GLUCOSE SERPL-MCNC: 85 MG/DL (ref 70–110)
HDLC SERPL-MCNC: 66 MG/DL (ref 40–75)
HDLC SERPL: 29.9 % (ref 20–50)
IRON SERPL-MCNC: 67 UG/DL (ref 30–160)
LDLC SERPL CALC-MCNC: 145.2 MG/DL (ref 63–159)
NONHDLC SERPL-MCNC: 155 MG/DL
POTASSIUM SERPL-SCNC: 4.3 MMOL/L (ref 3.5–5.1)
PROT SERPL-MCNC: 7.1 G/DL (ref 6–8.4)
SATURATED IRON: 18 % (ref 20–50)
SODIUM SERPL-SCNC: 141 MMOL/L (ref 136–145)
TOTAL IRON BINDING CAPACITY: 369 UG/DL (ref 250–450)
TRANSFERRIN SERPL-MCNC: 249 MG/DL (ref 200–375)
TRIGL SERPL-MCNC: 49 MG/DL (ref 30–150)
VIT B12 SERPL-MCNC: 485 PG/ML (ref 210–950)

## 2021-03-13 ENCOUNTER — PATIENT OUTREACH (OUTPATIENT)
Dept: ADMINISTRATIVE | Facility: OTHER | Age: 41
End: 2021-03-13

## 2021-03-15 ENCOUNTER — OFFICE VISIT (OUTPATIENT)
Dept: OBSTETRICS AND GYNECOLOGY | Facility: CLINIC | Age: 41
End: 2021-03-15
Attending: OBSTETRICS & GYNECOLOGY
Payer: MEDICAID

## 2021-03-15 DIAGNOSIS — Z31.69 ENCOUNTER FOR PRECONCEPTION CONSULTATION: Primary | ICD-10-CM

## 2021-03-15 LAB
TTG IGA SER-ACNC: 5 UNITS
TTG IGG SER-ACNC: 3 UNITS

## 2021-03-15 PROCEDURE — 99213 OFFICE O/P EST LOW 20 MIN: CPT | Mod: S$PBB,,, | Performed by: OBSTETRICS & GYNECOLOGY

## 2021-03-15 PROCEDURE — 99213 PR OFFICE/OUTPT VISIT, EST, LEVL III, 20-29 MIN: ICD-10-PCS | Mod: S$PBB,,, | Performed by: OBSTETRICS & GYNECOLOGY

## 2021-03-24 ENCOUNTER — PATIENT MESSAGE (OUTPATIENT)
Dept: OBSTETRICS AND GYNECOLOGY | Facility: CLINIC | Age: 41
End: 2021-03-24

## 2021-04-05 ENCOUNTER — LAB VISIT (OUTPATIENT)
Dept: LAB | Facility: HOSPITAL | Age: 41
End: 2021-04-05
Attending: INTERNAL MEDICINE
Payer: MEDICAID

## 2021-04-05 DIAGNOSIS — E03.8 HYPOTHYROIDISM DUE TO HASHIMOTO'S THYROIDITIS: ICD-10-CM

## 2021-04-05 DIAGNOSIS — E06.3 HYPOTHYROIDISM DUE TO HASHIMOTO'S THYROIDITIS: ICD-10-CM

## 2021-04-05 PROCEDURE — 36415 COLL VENOUS BLD VENIPUNCTURE: CPT | Mod: PO | Performed by: INTERNAL MEDICINE

## 2021-04-05 PROCEDURE — 84443 ASSAY THYROID STIM HORMONE: CPT | Performed by: INTERNAL MEDICINE

## 2021-04-06 LAB — TSH SERPL DL<=0.005 MIU/L-ACNC: 1.18 UIU/ML (ref 0.4–4)

## 2021-04-09 ENCOUNTER — TELEPHONE (OUTPATIENT)
Dept: ENDOCRINOLOGY | Facility: CLINIC | Age: 41
End: 2021-04-09

## 2021-04-12 ENCOUNTER — PATIENT MESSAGE (OUTPATIENT)
Dept: OBSTETRICS AND GYNECOLOGY | Facility: CLINIC | Age: 41
End: 2021-04-12

## 2021-04-12 ENCOUNTER — OFFICE VISIT (OUTPATIENT)
Dept: ENDOCRINOLOGY | Facility: CLINIC | Age: 41
End: 2021-04-12
Payer: MEDICAID

## 2021-04-12 DIAGNOSIS — E03.8 HYPOTHYROIDISM DUE TO HASHIMOTO'S THYROIDITIS: Primary | ICD-10-CM

## 2021-04-12 DIAGNOSIS — L65.9 HAIR LOSS: ICD-10-CM

## 2021-04-12 DIAGNOSIS — E06.3 HYPOTHYROIDISM DUE TO HASHIMOTO'S THYROIDITIS: Primary | ICD-10-CM

## 2021-04-12 PROCEDURE — 99214 PR OFFICE/OUTPT VISIT, EST, LEVL IV, 30-39 MIN: ICD-10-PCS | Mod: 95,,, | Performed by: INTERNAL MEDICINE

## 2021-04-12 PROCEDURE — 99214 OFFICE O/P EST MOD 30 MIN: CPT | Mod: 95,,, | Performed by: INTERNAL MEDICINE

## 2021-04-13 ENCOUNTER — PATIENT MESSAGE (OUTPATIENT)
Dept: ADMINISTRATIVE | Facility: OTHER | Age: 41
End: 2021-04-13

## 2021-04-13 ENCOUNTER — PATIENT MESSAGE (OUTPATIENT)
Dept: ENDOCRINOLOGY | Facility: CLINIC | Age: 41
End: 2021-04-13

## 2021-04-14 ENCOUNTER — LAB VISIT (OUTPATIENT)
Dept: LAB | Facility: HOSPITAL | Age: 41
End: 2021-04-14
Attending: INTERNAL MEDICINE
Payer: MEDICAID

## 2021-04-14 ENCOUNTER — TELEPHONE (OUTPATIENT)
Dept: OBSTETRICS AND GYNECOLOGY | Facility: CLINIC | Age: 41
End: 2021-04-14

## 2021-04-14 ENCOUNTER — TELEPHONE (OUTPATIENT)
Dept: DERMATOLOGY | Facility: CLINIC | Age: 41
End: 2021-04-14

## 2021-04-14 DIAGNOSIS — E03.8 HYPOTHYROIDISM DUE TO HASHIMOTO'S THYROIDITIS: ICD-10-CM

## 2021-04-14 DIAGNOSIS — E06.3 HYPOTHYROIDISM DUE TO HASHIMOTO'S THYROIDITIS: ICD-10-CM

## 2021-04-14 DIAGNOSIS — L65.9 HAIR LOSS: ICD-10-CM

## 2021-04-14 LAB
DHEA-S SERPL-MCNC: 287.9 UG/DL (ref 74.8–410.2)
TESTOST SERPL-MCNC: 41 NG/DL (ref 5–73)

## 2021-04-14 PROCEDURE — 36415 COLL VENOUS BLD VENIPUNCTURE: CPT | Mod: PO | Performed by: INTERNAL MEDICINE

## 2021-04-14 PROCEDURE — 82627 DEHYDROEPIANDROSTERONE: CPT | Performed by: INTERNAL MEDICINE

## 2021-04-14 PROCEDURE — 84403 ASSAY OF TOTAL TESTOSTERONE: CPT | Performed by: INTERNAL MEDICINE

## 2021-04-15 ENCOUNTER — PATIENT MESSAGE (OUTPATIENT)
Dept: ENDOCRINOLOGY | Facility: CLINIC | Age: 41
End: 2021-04-15

## 2021-04-15 ENCOUNTER — TELEPHONE (OUTPATIENT)
Dept: ENDOCRINOLOGY | Facility: CLINIC | Age: 41
End: 2021-04-15

## 2021-04-21 ENCOUNTER — OFFICE VISIT (OUTPATIENT)
Dept: DERMATOLOGY | Facility: CLINIC | Age: 41
End: 2021-04-21
Payer: MEDICAID

## 2021-04-21 ENCOUNTER — LAB VISIT (OUTPATIENT)
Dept: LAB | Facility: HOSPITAL | Age: 41
End: 2021-04-21
Attending: FAMILY MEDICINE
Payer: MEDICAID

## 2021-04-21 VITALS — HEIGHT: 61 IN | BODY MASS INDEX: 21.02 KG/M2 | WEIGHT: 111.31 LBS | RESPIRATION RATE: 18 BRPM

## 2021-04-21 DIAGNOSIS — L65.0 TELOGEN EFFLUVIUM: Primary | ICD-10-CM

## 2021-04-21 DIAGNOSIS — L65.0 TELOGEN EFFLUVIUM: ICD-10-CM

## 2021-04-21 PROCEDURE — 99212 PR OFFICE/OUTPT VISIT, EST, LEVL II, 10-19 MIN: ICD-10-PCS | Mod: S$PBB,,, | Performed by: DERMATOLOGY

## 2021-04-21 PROCEDURE — 82728 ASSAY OF FERRITIN: CPT | Performed by: DERMATOLOGY

## 2021-04-21 PROCEDURE — 99999 PR PBB SHADOW E&M-EST. PATIENT-LVL III: CPT | Mod: PBBFAC,,, | Performed by: DERMATOLOGY

## 2021-04-21 PROCEDURE — 36415 COLL VENOUS BLD VENIPUNCTURE: CPT | Mod: PO | Performed by: DERMATOLOGY

## 2021-04-21 PROCEDURE — 99999 PR PBB SHADOW E&M-EST. PATIENT-LVL III: ICD-10-PCS | Mod: PBBFAC,,, | Performed by: DERMATOLOGY

## 2021-04-21 PROCEDURE — 99213 OFFICE O/P EST LOW 20 MIN: CPT | Mod: PBBFAC,PO | Performed by: DERMATOLOGY

## 2021-04-21 PROCEDURE — 99212 OFFICE O/P EST SF 10 MIN: CPT | Mod: S$PBB,,, | Performed by: DERMATOLOGY

## 2021-04-22 LAB — FERRITIN SERPL-MCNC: 56 NG/ML (ref 20–300)

## 2021-04-30 ENCOUNTER — PATIENT MESSAGE (OUTPATIENT)
Dept: OBSTETRICS AND GYNECOLOGY | Facility: CLINIC | Age: 41
End: 2021-04-30

## 2021-04-30 ENCOUNTER — TELEPHONE (OUTPATIENT)
Dept: OBSTETRICS AND GYNECOLOGY | Facility: CLINIC | Age: 41
End: 2021-04-30

## 2021-05-03 ENCOUNTER — PATIENT MESSAGE (OUTPATIENT)
Dept: OBSTETRICS AND GYNECOLOGY | Facility: CLINIC | Age: 41
End: 2021-05-03

## 2021-05-03 ENCOUNTER — OFFICE VISIT (OUTPATIENT)
Dept: SURGERY | Facility: CLINIC | Age: 41
End: 2021-05-03
Payer: MEDICAID

## 2021-05-03 ENCOUNTER — TELEPHONE (OUTPATIENT)
Dept: SURGERY | Facility: CLINIC | Age: 41
End: 2021-05-03

## 2021-05-03 VITALS
WEIGHT: 112 LBS | SYSTOLIC BLOOD PRESSURE: 129 MMHG | BODY MASS INDEX: 21.14 KG/M2 | HEIGHT: 61 IN | DIASTOLIC BLOOD PRESSURE: 76 MMHG | HEART RATE: 75 BPM

## 2021-05-03 DIAGNOSIS — Z80.3 FAMILY HISTORY OF BREAST CANCER: ICD-10-CM

## 2021-05-03 DIAGNOSIS — R23.4 BREAST SKIN CHANGES: Primary | ICD-10-CM

## 2021-05-03 PROCEDURE — 99999 PR PBB SHADOW E&M-EST. PATIENT-LVL III: ICD-10-PCS | Mod: PBBFAC,,, | Performed by: SURGERY

## 2021-05-03 PROCEDURE — 99213 OFFICE O/P EST LOW 20 MIN: CPT | Mod: PBBFAC | Performed by: SURGERY

## 2021-05-03 PROCEDURE — 99203 OFFICE O/P NEW LOW 30 MIN: CPT | Mod: S$PBB,,, | Performed by: SURGERY

## 2021-05-03 PROCEDURE — 99999 PR PBB SHADOW E&M-EST. PATIENT-LVL III: CPT | Mod: PBBFAC,,, | Performed by: SURGERY

## 2021-05-03 PROCEDURE — 99203 PR OFFICE/OUTPT VISIT, NEW, LEVL III, 30-44 MIN: ICD-10-PCS | Mod: S$PBB,,, | Performed by: SURGERY

## 2021-05-04 ENCOUNTER — TELEPHONE (OUTPATIENT)
Dept: HEMATOLOGY/ONCOLOGY | Facility: CLINIC | Age: 41
End: 2021-05-04

## 2021-05-07 ENCOUNTER — TELEPHONE (OUTPATIENT)
Dept: OBSTETRICS AND GYNECOLOGY | Facility: CLINIC | Age: 41
End: 2021-05-07

## 2021-05-07 DIAGNOSIS — Z31.41 FERTILITY TESTING: Primary | ICD-10-CM

## 2021-05-10 ENCOUNTER — LAB VISIT (OUTPATIENT)
Dept: LAB | Facility: OTHER | Age: 41
End: 2021-05-10
Attending: OBSTETRICS & GYNECOLOGY
Payer: MEDICAID

## 2021-05-10 ENCOUNTER — OFFICE VISIT (OUTPATIENT)
Dept: OBSTETRICS AND GYNECOLOGY | Facility: CLINIC | Age: 41
End: 2021-05-10
Attending: OBSTETRICS & GYNECOLOGY
Payer: MEDICAID

## 2021-05-10 VITALS
BODY MASS INDEX: 21.06 KG/M2 | SYSTOLIC BLOOD PRESSURE: 102 MMHG | WEIGHT: 111.56 LBS | HEIGHT: 61 IN | DIASTOLIC BLOOD PRESSURE: 68 MMHG

## 2021-05-10 DIAGNOSIS — Z31.41 FERTILITY TESTING: Primary | ICD-10-CM

## 2021-05-10 DIAGNOSIS — Z31.41 FERTILITY TESTING: ICD-10-CM

## 2021-05-10 LAB
ESTRADIOL SERPL-MCNC: 69 PG/ML
FSH SERPL-ACNC: 7.4 MIU/ML
LH SERPL-ACNC: 5.2 MIU/ML

## 2021-05-10 PROCEDURE — 99212 OFFICE O/P EST SF 10 MIN: CPT | Mod: PBBFAC | Performed by: OBSTETRICS & GYNECOLOGY

## 2021-05-10 PROCEDURE — 99213 PR OFFICE/OUTPT VISIT, EST, LEVL III, 20-29 MIN: ICD-10-PCS | Mod: S$PBB,,, | Performed by: OBSTETRICS & GYNECOLOGY

## 2021-05-10 PROCEDURE — 83002 ASSAY OF GONADOTROPIN (LH): CPT | Performed by: OBSTETRICS & GYNECOLOGY

## 2021-05-10 PROCEDURE — 99213 OFFICE O/P EST LOW 20 MIN: CPT | Mod: S$PBB,,, | Performed by: OBSTETRICS & GYNECOLOGY

## 2021-05-10 PROCEDURE — 83001 ASSAY OF GONADOTROPIN (FSH): CPT | Performed by: OBSTETRICS & GYNECOLOGY

## 2021-05-10 PROCEDURE — 99999 PR PBB SHADOW E&M-EST. PATIENT-LVL II: ICD-10-PCS | Mod: PBBFAC,,, | Performed by: OBSTETRICS & GYNECOLOGY

## 2021-05-10 PROCEDURE — 99999 PR PBB SHADOW E&M-EST. PATIENT-LVL II: CPT | Mod: PBBFAC,,, | Performed by: OBSTETRICS & GYNECOLOGY

## 2021-05-10 PROCEDURE — 82670 ASSAY OF TOTAL ESTRADIOL: CPT | Performed by: OBSTETRICS & GYNECOLOGY

## 2021-05-10 PROCEDURE — 83520 IMMUNOASSAY QUANT NOS NONAB: CPT | Performed by: OBSTETRICS & GYNECOLOGY

## 2021-05-10 RX ORDER — LETROZOLE 2.5 MG/1
2.5 TABLET, FILM COATED ORAL DAILY
Qty: 5 TABLET | Refills: 0 | Status: SHIPPED | OUTPATIENT
Start: 2021-05-10 | End: 2021-05-12

## 2021-05-11 ENCOUNTER — PATIENT MESSAGE (OUTPATIENT)
Dept: ENDOCRINOLOGY | Facility: CLINIC | Age: 41
End: 2021-05-11

## 2021-05-11 ENCOUNTER — PATIENT MESSAGE (OUTPATIENT)
Dept: OBSTETRICS AND GYNECOLOGY | Facility: CLINIC | Age: 41
End: 2021-05-11

## 2021-05-11 DIAGNOSIS — E06.3 HYPOTHYROIDISM DUE TO HASHIMOTO'S THYROIDITIS: Primary | ICD-10-CM

## 2021-05-11 DIAGNOSIS — E03.8 HYPOTHYROIDISM DUE TO HASHIMOTO'S THYROIDITIS: Primary | ICD-10-CM

## 2021-05-12 ENCOUNTER — PATIENT MESSAGE (OUTPATIENT)
Dept: ENDOCRINOLOGY | Facility: CLINIC | Age: 41
End: 2021-05-12

## 2021-05-12 LAB — MIS SERPL-MCNC: 2.7 NG/ML (ref 0.03–5.5)

## 2021-05-13 ENCOUNTER — LAB VISIT (OUTPATIENT)
Dept: LAB | Facility: HOSPITAL | Age: 41
End: 2021-05-13
Attending: INTERNAL MEDICINE
Payer: MEDICAID

## 2021-05-13 DIAGNOSIS — E06.3 HYPOTHYROIDISM DUE TO HASHIMOTO'S THYROIDITIS: ICD-10-CM

## 2021-05-13 DIAGNOSIS — E03.8 HYPOTHYROIDISM DUE TO HASHIMOTO'S THYROIDITIS: ICD-10-CM

## 2021-05-13 PROCEDURE — 84443 ASSAY THYROID STIM HORMONE: CPT | Performed by: INTERNAL MEDICINE

## 2021-05-13 PROCEDURE — 84439 ASSAY OF FREE THYROXINE: CPT | Performed by: INTERNAL MEDICINE

## 2021-05-13 PROCEDURE — 36415 COLL VENOUS BLD VENIPUNCTURE: CPT | Mod: PO | Performed by: INTERNAL MEDICINE

## 2021-05-13 PROCEDURE — 84480 ASSAY TRIIODOTHYRONINE (T3): CPT | Performed by: INTERNAL MEDICINE

## 2021-05-14 ENCOUNTER — TELEPHONE (OUTPATIENT)
Dept: ENDOCRINOLOGY | Facility: CLINIC | Age: 41
End: 2021-05-14

## 2021-05-14 LAB
T3 SERPL-MCNC: 73 NG/DL (ref 60–180)
T4 FREE SERPL-MCNC: 1.33 NG/DL (ref 0.71–1.51)
TSH SERPL DL<=0.005 MIU/L-ACNC: 2.19 UIU/ML (ref 0.4–4)

## 2021-05-23 DIAGNOSIS — Z31.41 FERTILITY TESTING: ICD-10-CM

## 2021-05-24 RX ORDER — LETROZOLE 2.5 MG/1
TABLET, FILM COATED ORAL
Refills: 0 | OUTPATIENT
Start: 2021-05-24

## 2021-06-07 ENCOUNTER — OFFICE VISIT (OUTPATIENT)
Dept: RHEUMATOLOGY | Facility: CLINIC | Age: 41
End: 2021-06-07
Payer: MEDICAID

## 2021-06-07 ENCOUNTER — LAB VISIT (OUTPATIENT)
Dept: LAB | Facility: HOSPITAL | Age: 41
End: 2021-06-07
Attending: INTERNAL MEDICINE
Payer: MEDICAID

## 2021-06-07 VITALS
BODY MASS INDEX: 20.77 KG/M2 | HEIGHT: 61 IN | DIASTOLIC BLOOD PRESSURE: 70 MMHG | WEIGHT: 110 LBS | SYSTOLIC BLOOD PRESSURE: 103 MMHG | HEART RATE: 60 BPM

## 2021-06-07 DIAGNOSIS — E03.8 HYPOTHYROIDISM DUE TO HASHIMOTO'S THYROIDITIS: Primary | ICD-10-CM

## 2021-06-07 DIAGNOSIS — E06.3 HYPOTHYROIDISM DUE TO HASHIMOTO'S THYROIDITIS: ICD-10-CM

## 2021-06-07 DIAGNOSIS — E04.1 THYROID NODULE: ICD-10-CM

## 2021-06-07 DIAGNOSIS — L53.9 ERYTHEMA: ICD-10-CM

## 2021-06-07 DIAGNOSIS — E03.8 HYPOTHYROIDISM DUE TO HASHIMOTO'S THYROIDITIS: ICD-10-CM

## 2021-06-07 DIAGNOSIS — E06.3 HYPOTHYROIDISM DUE TO HASHIMOTO'S THYROIDITIS: Primary | ICD-10-CM

## 2021-06-07 LAB — ERYTHROCYTE [SEDIMENTATION RATE] IN BLOOD BY WESTERGREN METHOD: 7 MM/HR (ref 0–20)

## 2021-06-07 PROCEDURE — 99999 PR PBB SHADOW E&M-EST. PATIENT-LVL III: CPT | Mod: PBBFAC,,, | Performed by: INTERNAL MEDICINE

## 2021-06-07 PROCEDURE — 86800 THYROGLOBULIN ANTIBODY: CPT | Performed by: INTERNAL MEDICINE

## 2021-06-07 PROCEDURE — 86235 NUCLEAR ANTIGEN ANTIBODY: CPT | Performed by: INTERNAL MEDICINE

## 2021-06-07 PROCEDURE — 86038 ANTINUCLEAR ANTIBODIES: CPT | Performed by: INTERNAL MEDICINE

## 2021-06-07 PROCEDURE — 99204 OFFICE O/P NEW MOD 45 MIN: CPT | Mod: S$PBB,,, | Performed by: INTERNAL MEDICINE

## 2021-06-07 PROCEDURE — 86235 NUCLEAR ANTIGEN ANTIBODY: CPT | Mod: 59 | Performed by: INTERNAL MEDICINE

## 2021-06-07 PROCEDURE — 83516 IMMUNOASSAY NONANTIBODY: CPT | Performed by: INTERNAL MEDICINE

## 2021-06-07 PROCEDURE — 86376 MICROSOMAL ANTIBODY EACH: CPT | Performed by: INTERNAL MEDICINE

## 2021-06-07 PROCEDURE — 84144 ASSAY OF PROGESTERONE: CPT | Performed by: INTERNAL MEDICINE

## 2021-06-07 PROCEDURE — 99204 PR OFFICE/OUTPT VISIT, NEW, LEVL IV, 45-59 MIN: ICD-10-PCS | Mod: S$PBB,,, | Performed by: INTERNAL MEDICINE

## 2021-06-07 PROCEDURE — 85613 RUSSELL VIPER VENOM DILUTED: CPT | Performed by: INTERNAL MEDICINE

## 2021-06-07 PROCEDURE — 86160 COMPLEMENT ANTIGEN: CPT | Performed by: INTERNAL MEDICINE

## 2021-06-07 PROCEDURE — 99999 PR PBB SHADOW E&M-EST. PATIENT-LVL III: ICD-10-PCS | Mod: PBBFAC,,, | Performed by: INTERNAL MEDICINE

## 2021-06-07 PROCEDURE — 99213 OFFICE O/P EST LOW 20 MIN: CPT | Mod: PBBFAC,PO | Performed by: INTERNAL MEDICINE

## 2021-06-07 PROCEDURE — 86147 CARDIOLIPIN ANTIBODY EA IG: CPT | Mod: 59 | Performed by: INTERNAL MEDICINE

## 2021-06-07 PROCEDURE — 85651 RBC SED RATE NONAUTOMATED: CPT | Mod: PO | Performed by: INTERNAL MEDICINE

## 2021-06-07 PROCEDURE — 86225 DNA ANTIBODY NATIVE: CPT | Performed by: INTERNAL MEDICINE

## 2021-06-07 ASSESSMENT — ROUTINE ASSESSMENT OF PATIENT INDEX DATA (RAPID3)
PSYCHOLOGICAL DISTRESS SCORE: 0
PATIENT GLOBAL ASSESSMENT SCORE: 2.5
TOTAL RAPID3 SCORE: 1.5
MDHAQ FUNCTION SCORE: 0
FATIGUE SCORE: 1.1
PAIN SCORE: 2

## 2021-06-08 LAB
ANA SER QL IF: NORMAL
ANTI SM ANTIBODY: 0.07 RATIO (ref 0–0.99)
ANTI SM/RNP ANTIBODY: 0.06 RATIO (ref 0–0.99)
ANTI-SM INTERPRETATION: NEGATIVE
ANTI-SM/RNP INTERPRETATION: NEGATIVE
ANTI-SSA ANTIBODY: 0.05 RATIO (ref 0–0.99)
ANTI-SSA INTERPRETATION: NEGATIVE
ANTI-SSB ANTIBODY: 0.13 RATIO (ref 0–0.99)
ANTI-SSB INTERPRETATION: NEGATIVE
DSDNA AB SER-ACNC: NORMAL [IU]/ML
PROGEST SERPL-MCNC: 0.6 NG/ML
THYROGLOB AB SERPL IA-ACNC: <4 IU/ML (ref 0–3.9)
THYROPEROXIDASE IGG SERPL-ACNC: 149.3 IU/ML

## 2021-06-10 LAB
APTT IMM NP PPP: ABNORMAL SEC (ref 32–48)
APTT P HEP NEUT PPP: ABNORMAL SEC (ref 32–48)
CONFIRM APTT STACLOT: ABNORMAL
DRVVT SCREEN TO CONFIRM RATIO: ABNORMAL RATIO
LA 3 SCREEN W REFLEX-IMP: ABNORMAL
LA NT DPL PPP QL: ABNORMAL
MIXING DRVVT: ABNORMAL SEC (ref 33–44)
PROTHROMBIN TIME: 10.4 SEC (ref 12–15.5)
REPTILASE TIME: ABNORMAL SEC
SCREEN APTT: 34 SEC (ref 32–48)
SCREEN DRVVT: 34 SEC (ref 33–44)
THROMBIN TIME: ABNORMAL SEC (ref 14.7–19.5)

## 2021-06-11 LAB
CARDIOLIPIN IGG SER IA-ACNC: <9.4 GPL (ref 0–14.99)
CARDIOLIPIN IGM SER IA-ACNC: <9.4 MPL (ref 0–12.49)
ENA SCL70 AB SER-ACNC: 6 UNITS
HISTONE IGG SER IA-ACNC: 0.3 UNITS (ref 0–0.9)

## 2021-06-12 LAB — C1INH SERPL-MCNC: 34 MG/DL (ref 21–39)

## 2021-09-17 ENCOUNTER — PATIENT MESSAGE (OUTPATIENT)
Dept: ENDOCRINOLOGY | Facility: CLINIC | Age: 41
End: 2021-09-17

## 2021-09-17 RX ORDER — LEVOTHYROXINE SODIUM 50 UG/1
CAPSULE ORAL
Qty: 30 CAPSULE | Refills: 6 | Status: SHIPPED | OUTPATIENT
Start: 2021-09-17 | End: 2022-05-12

## 2021-09-17 RX ORDER — LEVOTHYROXINE SODIUM 13 UG/1
CAPSULE ORAL
Qty: 30 CAPSULE | Refills: 6 | Status: SHIPPED | OUTPATIENT
Start: 2021-09-17 | End: 2021-10-06

## 2021-09-27 ENCOUNTER — OFFICE VISIT (OUTPATIENT)
Dept: PHYSICAL MEDICINE AND REHAB | Facility: CLINIC | Age: 41
End: 2021-09-27
Payer: MEDICAID

## 2021-09-27 ENCOUNTER — HOSPITAL ENCOUNTER (OUTPATIENT)
Dept: RADIOLOGY | Facility: HOSPITAL | Age: 41
Discharge: HOME | End: 2021-09-27
Attending: PHYSICAL MEDICINE & REHABILITATION
Payer: MEDICAID

## 2021-09-27 VITALS — BODY MASS INDEX: 20.35 KG/M2 | HEIGHT: 61 IN | WEIGHT: 107.81 LBS

## 2021-09-27 DIAGNOSIS — M75.32 CALCIFIC TENDINITIS OF LEFT SHOULDER REGION: Primary | ICD-10-CM

## 2021-09-27 DIAGNOSIS — M75.32 CALCIFIC TENDINITIS OF LEFT SHOULDER REGION: ICD-10-CM

## 2021-09-27 DIAGNOSIS — Z01.818 PRE-OP TESTING: ICD-10-CM

## 2021-09-27 DIAGNOSIS — M75.32 CALCIFIC TENDINITIS OF LEFT SHOULDER: Primary | ICD-10-CM

## 2021-09-27 PROCEDURE — 76881 US COMPL JOINT R-T W/IMG: CPT | Mod: PBBFAC,PN | Performed by: PHYSICAL MEDICINE & REHABILITATION

## 2021-09-27 PROCEDURE — 99999 PR PBB SHADOW E&M-EST. PATIENT-LVL III: ICD-10-PCS | Mod: PBBFAC,,, | Performed by: PHYSICAL MEDICINE & REHABILITATION

## 2021-09-27 PROCEDURE — 76881 PR US EXTREMITY OR AXILLA, TISSUE/MUSCLE/JOINT/NERVE; COMPLETE: ICD-10-PCS | Mod: 26,S$PBB,, | Performed by: PHYSICAL MEDICINE & REHABILITATION

## 2021-09-27 PROCEDURE — 99214 OFFICE O/P EST MOD 30 MIN: CPT | Mod: 25,S$PBB,, | Performed by: PHYSICAL MEDICINE & REHABILITATION

## 2021-09-27 PROCEDURE — 99213 OFFICE O/P EST LOW 20 MIN: CPT | Mod: PBBFAC,PN | Performed by: PHYSICAL MEDICINE & REHABILITATION

## 2021-09-27 PROCEDURE — 76881 US COMPL JOINT R-T W/IMG: CPT | Mod: 26,S$PBB,, | Performed by: PHYSICAL MEDICINE & REHABILITATION

## 2021-09-27 PROCEDURE — 73030 X-RAY EXAM OF SHOULDER: CPT | Mod: TC,PO,LT

## 2021-09-27 PROCEDURE — 99999 PR PBB SHADOW E&M-EST. PATIENT-LVL III: CPT | Mod: PBBFAC,,, | Performed by: PHYSICAL MEDICINE & REHABILITATION

## 2021-09-27 PROCEDURE — 99214 PR OFFICE/OUTPT VISIT, EST, LEVL IV, 30-39 MIN: ICD-10-PCS | Mod: 25,S$PBB,, | Performed by: PHYSICAL MEDICINE & REHABILITATION

## 2021-09-27 PROCEDURE — 73030 X-RAY EXAM OF SHOULDER: CPT | Mod: 26,LT,, | Performed by: RADIOLOGY

## 2021-09-27 PROCEDURE — 73030 XR SHOULDER COMPLETE 2 OR MORE VIEWS LEFT: ICD-10-PCS | Mod: 26,LT,, | Performed by: RADIOLOGY

## 2021-09-27 RX ORDER — SODIUM CHLORIDE, SODIUM LACTATE, POTASSIUM CHLORIDE, CALCIUM CHLORIDE 600; 310; 30; 20 MG/100ML; MG/100ML; MG/100ML; MG/100ML
INJECTION, SOLUTION INTRAVENOUS CONTINUOUS
Status: CANCELLED | OUTPATIENT
Start: 2021-09-27

## 2021-09-27 RX ORDER — LIDOCAINE HYDROCHLORIDE 10 MG/ML
1 INJECTION, SOLUTION EPIDURAL; INFILTRATION; INTRACAUDAL; PERINEURAL ONCE
Status: CANCELLED | OUTPATIENT
Start: 2021-09-27 | End: 2021-09-27

## 2021-09-27 RX ORDER — MIDAZOLAM HYDROCHLORIDE 1 MG/ML
2 INJECTION INTRAMUSCULAR; INTRAVENOUS ONCE AS NEEDED
Status: CANCELLED | OUTPATIENT
Start: 2021-09-27 | End: 2033-02-23

## 2021-10-28 ENCOUNTER — IMMUNIZATION (OUTPATIENT)
Dept: INTERNAL MEDICINE | Facility: CLINIC | Age: 41
End: 2021-10-28
Payer: MEDICAID

## 2021-10-28 DIAGNOSIS — Z23 NEED FOR VACCINATION: Primary | ICD-10-CM

## 2021-10-28 PROCEDURE — 0001A COVID-19, MRNA, LNP-S, PF, 30 MCG/0.3 ML DOSE VACCINE: CPT | Mod: PBBFAC

## 2021-11-04 ENCOUNTER — TELEPHONE (OUTPATIENT)
Dept: PHYSICAL MEDICINE AND REHAB | Facility: CLINIC | Age: 41
End: 2021-11-04
Payer: MEDICAID

## 2021-11-19 ENCOUNTER — IMMUNIZATION (OUTPATIENT)
Dept: INTERNAL MEDICINE | Facility: CLINIC | Age: 41
End: 2021-11-19
Payer: MEDICAID

## 2021-11-19 DIAGNOSIS — Z23 NEED FOR VACCINATION: Primary | ICD-10-CM

## 2021-11-19 PROCEDURE — 91300 COVID-19, MRNA, LNP-S, PF, 30 MCG/0.3 ML DOSE VACCINE: CPT | Mod: PBBFAC

## 2021-11-19 PROCEDURE — 0002A COVID-19, MRNA, LNP-S, PF, 30 MCG/0.3 ML DOSE VACCINE: CPT | Mod: PBBFAC

## 2021-12-17 ENCOUNTER — OFFICE VISIT (OUTPATIENT)
Dept: OBSTETRICS AND GYNECOLOGY | Facility: CLINIC | Age: 41
End: 2021-12-17
Attending: OBSTETRICS & GYNECOLOGY
Payer: MEDICAID

## 2021-12-17 VITALS — DIASTOLIC BLOOD PRESSURE: 56 MMHG | WEIGHT: 112.63 LBS | SYSTOLIC BLOOD PRESSURE: 98 MMHG | BODY MASS INDEX: 21.29 KG/M2

## 2021-12-17 DIAGNOSIS — R94.6 ABNORMAL RESULTS OF THYROID FUNCTION STUDIES: Primary | ICD-10-CM

## 2021-12-17 DIAGNOSIS — N97.0 ANOVULATION: ICD-10-CM

## 2021-12-17 PROCEDURE — 99999 PR PBB SHADOW E&M-EST. PATIENT-LVL II: ICD-10-PCS | Mod: PBBFAC,,, | Performed by: OBSTETRICS & GYNECOLOGY

## 2021-12-17 PROCEDURE — 99213 PR OFFICE/OUTPT VISIT, EST, LEVL III, 20-29 MIN: ICD-10-PCS | Mod: S$PBB,,, | Performed by: OBSTETRICS & GYNECOLOGY

## 2021-12-17 PROCEDURE — 99212 OFFICE O/P EST SF 10 MIN: CPT | Mod: PBBFAC | Performed by: OBSTETRICS & GYNECOLOGY

## 2021-12-17 PROCEDURE — 99213 OFFICE O/P EST LOW 20 MIN: CPT | Mod: S$PBB,,, | Performed by: OBSTETRICS & GYNECOLOGY

## 2021-12-17 PROCEDURE — 99999 PR PBB SHADOW E&M-EST. PATIENT-LVL II: CPT | Mod: PBBFAC,,, | Performed by: OBSTETRICS & GYNECOLOGY

## 2021-12-17 RX ORDER — LETROZOLE 2.5 MG/1
2.5 TABLET, FILM COATED ORAL DAILY
Qty: 5 TABLET | Refills: 0 | Status: SHIPPED | OUTPATIENT
Start: 2021-12-17 | End: 2023-02-14

## 2021-12-19 DIAGNOSIS — Z31.41 FERTILITY TESTING: ICD-10-CM

## 2021-12-20 ENCOUNTER — LAB VISIT (OUTPATIENT)
Dept: LAB | Facility: OTHER | Age: 41
End: 2021-12-20
Attending: OBSTETRICS & GYNECOLOGY
Payer: MEDICAID

## 2021-12-20 DIAGNOSIS — N97.0 ANOVULATION: ICD-10-CM

## 2021-12-20 DIAGNOSIS — R94.6 ABNORMAL RESULTS OF THYROID FUNCTION STUDIES: ICD-10-CM

## 2021-12-20 LAB
ESTRADIOL SERPL-MCNC: 31 PG/ML
T4 FREE SERPL-MCNC: 1.2 NG/DL (ref 0.71–1.51)
THYROPEROXIDASE IGG SERPL-ACNC: 96.3 IU/ML
TSH SERPL DL<=0.005 MIU/L-ACNC: 1.79 UIU/ML (ref 0.4–4)

## 2021-12-20 PROCEDURE — 84443 ASSAY THYROID STIM HORMONE: CPT | Performed by: OBSTETRICS & GYNECOLOGY

## 2021-12-20 PROCEDURE — 86376 MICROSOMAL ANTIBODY EACH: CPT | Performed by: OBSTETRICS & GYNECOLOGY

## 2021-12-20 PROCEDURE — 83520 IMMUNOASSAY QUANT NOS NONAB: CPT | Performed by: OBSTETRICS & GYNECOLOGY

## 2021-12-20 PROCEDURE — 82670 ASSAY OF TOTAL ESTRADIOL: CPT | Performed by: OBSTETRICS & GYNECOLOGY

## 2021-12-20 PROCEDURE — 36415 COLL VENOUS BLD VENIPUNCTURE: CPT | Performed by: OBSTETRICS & GYNECOLOGY

## 2021-12-20 PROCEDURE — 84439 ASSAY OF FREE THYROXINE: CPT | Performed by: OBSTETRICS & GYNECOLOGY

## 2021-12-21 ENCOUNTER — PATIENT MESSAGE (OUTPATIENT)
Dept: OBSTETRICS AND GYNECOLOGY | Facility: CLINIC | Age: 41
End: 2021-12-21
Payer: MEDICAID

## 2021-12-21 RX ORDER — LETROZOLE 2.5 MG/1
TABLET, FILM COATED ORAL
Refills: 0 | OUTPATIENT
Start: 2021-12-21

## 2021-12-22 LAB — MIS SERPL-MCNC: 1.7 NG/ML (ref 0.03–5.5)

## 2022-01-04 ENCOUNTER — PATIENT MESSAGE (OUTPATIENT)
Dept: OBSTETRICS AND GYNECOLOGY | Facility: CLINIC | Age: 42
End: 2022-01-04
Payer: MEDICAID

## 2022-01-04 DIAGNOSIS — N92.6 IRREGULAR PERIODS: Primary | ICD-10-CM

## 2022-01-05 ENCOUNTER — LAB VISIT (OUTPATIENT)
Dept: LAB | Facility: OTHER | Age: 42
End: 2022-01-05
Attending: OBSTETRICS & GYNECOLOGY
Payer: MEDICAID

## 2022-01-05 DIAGNOSIS — N92.6 IRREGULAR PERIODS: ICD-10-CM

## 2022-01-05 LAB — PROGEST SERPL-MCNC: 16.9 NG/ML

## 2022-01-05 PROCEDURE — 84144 ASSAY OF PROGESTERONE: CPT | Performed by: OBSTETRICS & GYNECOLOGY

## 2022-01-05 PROCEDURE — 36415 COLL VENOUS BLD VENIPUNCTURE: CPT | Performed by: OBSTETRICS & GYNECOLOGY

## 2022-01-07 ENCOUNTER — PATIENT MESSAGE (OUTPATIENT)
Dept: HEMATOLOGY/ONCOLOGY | Facility: CLINIC | Age: 42
End: 2022-01-07
Payer: MEDICAID

## 2022-01-07 ENCOUNTER — OFFICE VISIT (OUTPATIENT)
Dept: URGENT CARE | Facility: CLINIC | Age: 42
End: 2022-01-07
Payer: MEDICAID

## 2022-01-07 VITALS
RESPIRATION RATE: 16 BRPM | HEIGHT: 61 IN | HEART RATE: 64 BPM | DIASTOLIC BLOOD PRESSURE: 73 MMHG | OXYGEN SATURATION: 96 % | SYSTOLIC BLOOD PRESSURE: 119 MMHG | BODY MASS INDEX: 21.34 KG/M2 | TEMPERATURE: 98 F | WEIGHT: 113 LBS

## 2022-01-07 DIAGNOSIS — J02.9 SORE THROAT: ICD-10-CM

## 2022-01-07 DIAGNOSIS — J02.9 PHARYNGITIS, UNSPECIFIED ETIOLOGY: Primary | ICD-10-CM

## 2022-01-07 LAB
CTP QC/QA: YES
SARS-COV-2 RDRP RESP QL NAA+PROBE: NEGATIVE

## 2022-01-07 PROCEDURE — 3008F PR BODY MASS INDEX (BMI) DOCUMENTED: ICD-10-PCS | Mod: CPTII,S$GLB,, | Performed by: PHYSICIAN ASSISTANT

## 2022-01-07 PROCEDURE — 1160F RVW MEDS BY RX/DR IN RCRD: CPT | Mod: CPTII,S$GLB,, | Performed by: PHYSICIAN ASSISTANT

## 2022-01-07 PROCEDURE — 99213 OFFICE O/P EST LOW 20 MIN: CPT | Mod: S$GLB,,, | Performed by: PHYSICIAN ASSISTANT

## 2022-01-07 PROCEDURE — U0002: ICD-10-PCS | Mod: QW,S$GLB,, | Performed by: PHYSICIAN ASSISTANT

## 2022-01-07 PROCEDURE — 99213 PR OFFICE/OUTPT VISIT, EST, LEVL III, 20-29 MIN: ICD-10-PCS | Mod: S$GLB,,, | Performed by: PHYSICIAN ASSISTANT

## 2022-01-07 PROCEDURE — 1159F MED LIST DOCD IN RCRD: CPT | Mod: CPTII,S$GLB,, | Performed by: PHYSICIAN ASSISTANT

## 2022-01-07 PROCEDURE — 3078F DIAST BP <80 MM HG: CPT | Mod: CPTII,S$GLB,, | Performed by: PHYSICIAN ASSISTANT

## 2022-01-07 PROCEDURE — 3078F PR MOST RECENT DIASTOLIC BLOOD PRESSURE < 80 MM HG: ICD-10-PCS | Mod: CPTII,S$GLB,, | Performed by: PHYSICIAN ASSISTANT

## 2022-01-07 PROCEDURE — U0002 COVID-19 LAB TEST NON-CDC: HCPCS | Mod: QW,S$GLB,, | Performed by: PHYSICIAN ASSISTANT

## 2022-01-07 PROCEDURE — 3008F BODY MASS INDEX DOCD: CPT | Mod: CPTII,S$GLB,, | Performed by: PHYSICIAN ASSISTANT

## 2022-01-07 PROCEDURE — 3074F PR MOST RECENT SYSTOLIC BLOOD PRESSURE < 130 MM HG: ICD-10-PCS | Mod: CPTII,S$GLB,, | Performed by: PHYSICIAN ASSISTANT

## 2022-01-07 PROCEDURE — 1160F PR REVIEW ALL MEDS BY PRESCRIBER/CLIN PHARMACIST DOCUMENTED: ICD-10-PCS | Mod: CPTII,S$GLB,, | Performed by: PHYSICIAN ASSISTANT

## 2022-01-07 PROCEDURE — 3074F SYST BP LT 130 MM HG: CPT | Mod: CPTII,S$GLB,, | Performed by: PHYSICIAN ASSISTANT

## 2022-01-07 PROCEDURE — 1159F PR MEDICATION LIST DOCUMENTED IN MEDICAL RECORD: ICD-10-PCS | Mod: CPTII,S$GLB,, | Performed by: PHYSICIAN ASSISTANT

## 2022-01-07 NOTE — PROGRESS NOTES
"Subjective:       Patient ID: Juan Ramirez is a 41 y.o. female.    Vitals:  height is 5' 1" (1.549 m) and weight is 51.3 kg (113 lb). Her oral temperature is 98.4 °F (36.9 °C). Her blood pressure is 119/73 and her pulse is 64. Her respiration is 16 and oxygen saturation is 96%.     Chief Complaint: Sore Throat    Patient presents to  for evaluation of sore throat that started this morning. The sore throat has persisted the whole day. She denies any difficulty swallowing. Patient has not tried any OTC medications for symptom relief.  Afebrile.  She denies any GI upset, abdominal pain, chest pain, or shortness of breath.  Denies any congestion, postnasal drip, or cough. No known exposure to COVID. She is COVID vaccinated. She states that she noticed white debris on her tongue.    Sore Throat   This is a new problem. The current episode started today. The problem has been unchanged. The pain is worse on the left side. There has been no fever. Pertinent negatives include no abdominal pain, congestion, coughing, diarrhea, drooling, ear discharge, ear pain, headaches, hoarse voice, neck pain, shortness of breath, stridor, trouble swallowing or vomiting. She has had no exposure to strep. She has tried nothing for the symptoms. The treatment provided no relief.       Constitution: Negative for chills and fever.   HENT: Positive for sore throat. Negative for ear pain, ear discharge, drooling, congestion and trouble swallowing.    Neck: Negative for neck pain.   Cardiovascular: Negative for chest pain.   Respiratory: Negative for cough, shortness of breath and stridor.    Gastrointestinal: Negative for abdominal pain, vomiting and diarrhea.   Musculoskeletal: Negative for muscle ache.   Skin: Negative for rash.   Neurological: Negative for headaches.       Objective:      Physical Exam   Constitutional: She is oriented to person, place, and time. She appears well-developed and well-nourished. She is cooperative.  " Non-toxic appearance. She does not have a sickly appearance. She does not appear ill. No distress.   HENT:   Head: Normocephalic and atraumatic.   Ears:   Right Ear: Hearing, tympanic membrane, external ear and ear canal normal. impacted cerumen  Left Ear: Hearing, tympanic membrane, external ear and ear canal normal. impacted cerumen  Nose: Nose normal. No mucosal edema, rhinorrhea, nasal deformity or congestion. No epistaxis. Right sinus exhibits no maxillary sinus tenderness and no frontal sinus tenderness. Left sinus exhibits no maxillary sinus tenderness and no frontal sinus tenderness.   Mouth/Throat: Uvula is midline and mucous membranes are normal. No trismus in the jaw. Normal dentition. No uvula swelling. Posterior oropharyngeal erythema and cobblestoning present. No oropharyngeal exudate, posterior oropharyngeal edema or tonsillar abscesses.       Eyes: Conjunctivae and lids are normal. Right eye exhibits no discharge. Left eye exhibits no discharge. No scleral icterus.   Neck: Trachea normal and phonation normal. Neck supple. No edema present. No erythema present. No neck rigidity present.   Cardiovascular: Normal rate, regular rhythm, normal heart sounds, intact distal pulses and normal pulses.   No murmur heard.Exam reveals no gallop and no friction rub.   Pulmonary/Chest: Effort normal and breath sounds normal. No stridor. No respiratory distress. She has no decreased breath sounds. She has no wheezes. She has no rhonchi. She has no rales.   Abdominal: Normal appearance.   Musculoskeletal: Normal range of motion.         General: No deformity or edema. Normal range of motion.   Lymphadenopathy:        Head (right side): No submandibular and no tonsillar adenopathy present.        Head (left side): No submandibular and no tonsillar adenopathy present.     She has no cervical adenopathy.        Right cervical: No superficial cervical and no posterior cervical adenopathy present.       Left cervical: No  superficial cervical and no posterior cervical adenopathy present.   Neurological: She is alert and oriented to person, place, and time. She exhibits normal muscle tone. Coordination normal.   Skin: Skin is warm, dry, intact, not diaphoretic and not pale.   Psychiatric: She has a normal mood and affect. Her speech is normal and behavior is normal. Judgment and thought content normal. Cognition and memory  Nursing note and vitals reviewed.        Results for orders placed or performed in visit on 01/07/22   POCT COVID-19 Rapid Screening   Result Value Ref Range    POC Rapid COVID Negative Negative     Acceptable Yes        Assessment:       1. Pharyngitis, unspecified etiology    2. Sore throat        Plan:     COVID testing negative at this time and reviewed results with patient.  Discussed that the source of her sore throat is likely viral.  Erythema and cobblestoning noted to posterior pharynx however no exudates noted.  Low suspicion of oral thrush because white debris does not scrape off.  Magic mouthwash called in for analgesic relief.  Discussed that should she develop any fever in the next few days or no improvement in symptoms, consider strep testing and follow-up with her family provider.  Low suspicion of strep at this time due to afebrile and no adenopathy.  No visible exudates with cobblestoning present.  Patient verbalized understanding and all of their questions were answered.       Pharyngitis, unspecified etiology  -     (Magic mouthwash) 1:1:1 diphenhydramine(Benadryl) 12.5mg/5ml liq, aluminum & magnesium hydroxide-simethicone (Maalox), LIDOcaine viscous 2%; Swish and spit 10 mLs every 4 (four) hours as needed (sore throat).  Dispense: 90 mL; Refill: 0    Sore throat  -     POCT COVID-19 Rapid Screening      Patient Instructions     Patient Education       Sore Throat Discharge Instructions, Adult   About this topic   Swelling at the back of your throat is called pharyngitis. Swelling  of your throat and tonsils is tonsillopharyngitis. Both are commonly called a sore throat. Your sore throat is likely caused by a virus. Most of the time, a sore throat will go away without antibiotics in a week or two.           What care is needed at home?   · Ask your doctor what you need to do when you go home. Make sure you ask questions if you do not understand what the doctor says. This way you will know what you need to do.  · To help ease a sore throat you can:  ? Use a sore throat spray.  ? Suck on hard candy or throat lozenges.  ? Gargle with warm saltwater a few times each day. Mix of 1/4 teaspoon (1.25 grams) salt in 8 ounces (240 mL) of warm water.  ? Use a cool mist humidifier to help you breathe easier.  · You may want to take medicine like acetaminophen, ibuprofen, or naproxen for pain.  · If you decide to take over-the-counter cough or cold medicines, follow the directions on the label carefully. Be sure you do not take more than one medicine that contains acetaminophen. Also, if you have a heart problem or high blood pressure, check with your doctor before you take any of these medicines.  · Wash your hands often. This will help keep others healthy.  What follow-up care is needed?   Your doctor may ask you to make visits to the office to check on your progress. Be sure to keep these visits.  What drugs may be needed?   Take your drugs as ordered by your doctor. Do not skip doses or stop when you feel better. The doctor may order drugs to:  · Prevent or fight an infection  · Help with pain  · Lower fever  · Help nasal congestion and runny nose  · Soothe the throat  Will physical activity be limited?   You may need to rest at home for 1 to 2 days or until you are feeling well.  What changes to diet are needed?   If your throat feels too sore to eat solid foods you may drink juice, milk, milkshakes, or soups. Talk to your doctor about what diet is proper for your condition.  What can be done to  prevent this health problem?   · Wash your hands often. Be sure to wash after you blow your nose or take care of others with a sore throat.  · Do not share utensils and drinking glasses with someone who has a sore throat. Wash these objects with hot, soapy water.  · Do not share your foods or drinks with others while you are sick. You might infect them.  · Throw away used tissues right away and then wash your hands.  · Get a new toothbrush after signs are gone or you are done with your antibiotics.  When do I need to call the doctor?   · You have trouble breathing.  · Your neck, tongue, or throat is swelling.  · You are drooling because you cannot swallow your saliva.  · You have very bad pain in your throat that keeps you from eating or drinking anything.  · There are large, painful lumps in your neck.  · You have blisters in the back of your throat.  Teach Back: Helping You Understand   The Teach Back Method helps you understand the information we are giving you. After you talk with the staff, tell them in your own words what you learned. This helps to make sure the staff has described each thing clearly. It also helps to explain things that may have been confusing. Before going home, make sure you can do these:  · I can tell you about my condition.  · I can tell you what may help ease my pain.  · I can tell you what I will do if I have trouble breathing or swallowing or have large painful lumps in my throat.  Where can I learn more?   Centers for Disease Control and Prevention  https://www.cdc.gov/antibiotic-use/community/for-patients/common-illnesses/sore-throat.html   [facility] [employer] [facility]  https://www.health.[place].nz/your-health/conditions-and-treatments/diseases-and-illnesses/sore-throat   [facility] Choices  https://www.nhs.[place]/conditions/sore-throat/   Last Reviewed Date   2021-06-08  Consumer Information Use and Disclaimer   This information is not specific medical advice and does not replace information you receive  from your health care provider. This is only a brief summary of general information. It does NOT include all information about conditions, illnesses, injuries, tests, procedures, treatments, therapies, discharge instructions or life-style choices that may apply to you. You must talk with your health care provider for complete information about your health and treatment options. This information should not be used to decide whether or not to accept your health care providers advice, instructions or recommendations. Only your health care provider has the knowledge and training to provide advice that is right for you.  Copyright   Copyright © 2021 [employer] and its affiliates and/or licensors. All rights reserved.      Patient Instructions   -Below are suggestions for symptomatic relief:              -Tylenol every 4 hours OR ibuprofen every 6 hours as needed for pain/fever.              -Salt water gargles to soothe throat pain.              -Chloroseptic spray also helps to numb throat pain.              -Nasal saline spray reduces inflammation and dryness.              -Warm face compresses to help with facial sinus pain/pressure.              -Vicks vapor rub at night.              -Flonase OTC or Nasacort OTC for nasal congestion.              -Simple foods like chicken noodle soup.              -Delsym helps with coughing at night              -Zyrtec/Claritin during the day & Benadryl at night may help with allergies.                If you DO NOT have Hypertension or any history of palpitations, it is ok to take over the counter Sudafed or Mucinex D or Allegra-D or Claritin-D or Zyrtec-D.  If you do take one of the above, it is ok to combine that with plain over the counter Mucinex or Allegra or Claritin or Zyrtec. If, for example, you are taking Zyrtec -D, you can combine that with Mucinex, but not Mucinex-D.  If you are taking Mucinex-D, you can combine that with plain Allegra or Claritin or Zyrtec.   If  you DO have Hypertension or palpitations, it is safe to take Coricidin HBP for relief of sinus symptoms.      Please follow up with your Primary care provider within 2-5 days if your signs and symptoms have not resolved or worsen.     If your condition worsens or fails to improve we recommend that you receive another evaluation at the emergency room immediately or contact your primary medical clinic to discuss your concerns.   You must understand that you have received an Urgent Care treatment only and that you may be released before all of your medical problems are known or treated. You, the patient, will arrange for follow up care as instructed.     RED FLAGS/WARNING SYMPTOMS DISCUSSED WITH PATIENT THAT WOULD WARRANT EMERGENT MEDICAL ATTENTION. PATIENT VERBALIZED UNDERSTANDING.

## 2022-01-08 NOTE — PATIENT INSTRUCTIONS
Patient Education       Sore Throat Discharge Instructions, Adult   About this topic   Swelling at the back of your throat is called pharyngitis. Swelling of your throat and tonsils is tonsillopharyngitis. Both are commonly called a sore throat. Your sore throat is likely caused by a virus. Most of the time, a sore throat will go away without antibiotics in a week or two.           What care is needed at home?   · Ask your doctor what you need to do when you go home. Make sure you ask questions if you do not understand what the doctor says. This way you will know what you need to do.  · To help ease a sore throat you can:  ? Use a sore throat spray.  ? Suck on hard candy or throat lozenges.  ? Gargle with warm saltwater a few times each day. Mix of 1/4 teaspoon (1.25 grams) salt in 8 ounces (240 mL) of warm water.  ? Use a cool mist humidifier to help you breathe easier.  · You may want to take medicine like acetaminophen, ibuprofen, or naproxen for pain.  · If you decide to take over-the-counter cough or cold medicines, follow the directions on the label carefully. Be sure you do not take more than one medicine that contains acetaminophen. Also, if you have a heart problem or high blood pressure, check with your doctor before you take any of these medicines.  · Wash your hands often. This will help keep others healthy.  What follow-up care is needed?   Your doctor may ask you to make visits to the office to check on your progress. Be sure to keep these visits.  What drugs may be needed?   Take your drugs as ordered by your doctor. Do not skip doses or stop when you feel better. The doctor may order drugs to:  · Prevent or fight an infection  · Help with pain  · Lower fever  · Help nasal congestion and runny nose  · Soothe the throat  Will physical activity be limited?   You may need to rest at home for 1 to 2 days or until you are feeling well.  What changes to diet are needed?   If your throat feels too sore to  eat solid foods you may drink juice, milk, milkshakes, or soups. Talk to your doctor about what diet is proper for your condition.  What can be done to prevent this health problem?   · Wash your hands often. Be sure to wash after you blow your nose or take care of others with a sore throat.  · Do not share utensils and drinking glasses with someone who has a sore throat. Wash these objects with hot, soapy water.  · Do not share your foods or drinks with others while you are sick. You might infect them.  · Throw away used tissues right away and then wash your hands.  · Get a new toothbrush after signs are gone or you are done with your antibiotics.  When do I need to call the doctor?   · You have trouble breathing.  · Your neck, tongue, or throat is swelling.  · You are drooling because you cannot swallow your saliva.  · You have very bad pain in your throat that keeps you from eating or drinking anything.  · There are large, painful lumps in your neck.  · You have blisters in the back of your throat.  Teach Back: Helping You Understand   The Teach Back Method helps you understand the information we are giving you. After you talk with the staff, tell them in your own words what you learned. This helps to make sure the staff has described each thing clearly. It also helps to explain things that may have been confusing. Before going home, make sure you can do these:  · I can tell you about my condition.  · I can tell you what may help ease my pain.  · I can tell you what I will do if I have trouble breathing or swallowing or have large painful lumps in my throat.  Where can I learn more?   Centers for Disease Control and Prevention  https://www.cdc.gov/antibiotic-use/community/for-patients/common-illnesses/sore-throat.html   [facility] [facility]  https://www.health.[place].nz/your-health/conditions-and-treatments/diseases-and-illnesses/sore-throat   [facility] Choices  https://www.nhs.[place]/conditions/sore-throat/   Last Reviewed  Date   2021-06-08  Consumer Information Use and Disclaimer   This information is not specific medical advice and does not replace information you receive from your health care provider. This is only a brief summary of general information. It does NOT include all information about conditions, illnesses, injuries, tests, procedures, treatments, therapies, discharge instructions or life-style choices that may apply to you. You must talk with your health care provider for complete information about your health and treatment options. This information should not be used to decide whether or not to accept your health care providers advice, instructions or recommendations. Only your health care provider has the knowledge and training to provide advice that is right for you.  Copyright   Copyright © 2021 [employer] and its affiliates and/or licensors. All rights reserved.      Patient Instructions   -Below are suggestions for symptomatic relief:              -Tylenol every 4 hours OR ibuprofen every 6 hours as needed for pain/fever.              -Salt water gargles to soothe throat pain.              -Chloroseptic spray also helps to numb throat pain.              -Nasal saline spray reduces inflammation and dryness.              -Warm face compresses to help with facial sinus pain/pressure.              -Vicks vapor rub at night.              -Flonase OTC or Nasacort OTC for nasal congestion.              -Simple foods like chicken noodle soup.              -Delsym helps with coughing at night              -Zyrtec/Claritin during the day & Benadryl at night may help with allergies.                If you DO NOT have Hypertension or any history of palpitations, it is ok to take over the counter Sudafed or Mucinex D or Allegra-D or Claritin-D or Zyrtec-D.  If you do take one of the above, it is ok to combine that with plain over the counter Mucinex or Allegra or Claritin or Zyrtec. If, for example, you are taking Zyrtec  -D, you can combine that with Mucinex, but not Mucinex-D.  If you are taking Mucinex-D, you can combine that with plain Allegra or Claritin or Zyrtec.   If you DO have Hypertension or palpitations, it is safe to take Coricidin HBP for relief of sinus symptoms.      Please follow up with your Primary care provider within 2-5 days if your signs and symptoms have not resolved or worsen.     If your condition worsens or fails to improve we recommend that you receive another evaluation at the emergency room immediately or contact your primary medical clinic to discuss your concerns.   You must understand that you have received an Urgent Care treatment only and that you may be released before all of your medical problems are known or treated. You, the patient, will arrange for follow up care as instructed.     RED FLAGS/WARNING SYMPTOMS DISCUSSED WITH PATIENT THAT WOULD WARRANT EMERGENT MEDICAL ATTENTION. PATIENT VERBALIZED UNDERSTANDING.

## 2022-01-13 ENCOUNTER — OFFICE VISIT (OUTPATIENT)
Dept: HEMATOLOGY/ONCOLOGY | Facility: CLINIC | Age: 42
End: 2022-01-13
Payer: MEDICAID

## 2022-01-13 ENCOUNTER — PATIENT MESSAGE (OUTPATIENT)
Dept: HEMATOLOGY/ONCOLOGY | Facility: CLINIC | Age: 42
End: 2022-01-13

## 2022-01-13 DIAGNOSIS — Z71.83 ENCOUNTER FOR NONPROCREATIVE GENETIC COUNSELING: Primary | ICD-10-CM

## 2022-01-13 DIAGNOSIS — Z80.3 FAMILY HISTORY OF BREAST CANCER: ICD-10-CM

## 2022-01-13 PROCEDURE — 99204 OFFICE O/P NEW MOD 45 MIN: CPT | Mod: 95,,, | Performed by: NURSE PRACTITIONER

## 2022-01-13 PROCEDURE — 99204 PR OFFICE/OUTPT VISIT, NEW, LEVL IV, 45-59 MIN: ICD-10-PCS | Mod: 95,,, | Performed by: NURSE PRACTITIONER

## 2022-01-13 NOTE — Clinical Note
Follow-up:  Follow up in about 1 year (around 1/13/2023) for re-discussion regarding genetic testing,

## 2022-01-13 NOTE — PROGRESS NOTES
"Cancer Genetics  Hereditary/High Risk Clinic  Department of Hematology and Oncology  [facility]    Date of Service:  22  Provider:  Jose Manuel Garza DNP    Patient Information  Name: Juan Ramirez  : 1980  MRN: [id number]     Referring Provider    Ani Roberts MD  [address]  [facility] [address]    Televisit Information  The patient location is: [place], [place].  The chief complaint leading to consultation is: as below.  Visit type: mostly audiovisual (roughly 5 minutes during which the video capability went out).  Face-to-face time with patient: approximately 45 minutes.  Approximately 52 minutes in total were spent on this encounter, which includes face-to-face time and non-face-to-face time preparing to see the patient (e.g., review of tests), obtaining and/or reviewing separately obtained history, documenting clinical information in the electronic or other health record, independently interpreting results (not separately reported) and communicating results to the patient/family/caregiver, or care coordination (not separately reported).  Each patient to whom he or she provides medical services by telemedicine is:  (1) informed of the relationship between the physician and patient and the respective role of any other health care provider with respect to management of the patient; and (2) notified that he or she may decline to receive medical services by telemedicine and may withdraw from such care at any time.  Notes:    SUBJECTIVE      Chief Complaint: Genetic Evaluation    History of Present Illness (HPI):  Juan Ramirez ("Juan"), 41 y.o., assigned female sex at birth, is new to the [facility] and to me.  She was referred by [facility] for genetic cancer risk assessment given her family history of breast cancer.    Focused Medical History    Germline cancer genetic " testing:  No   Cancer:     No   Colon polyp:    No h/o colonoscopy/CRC screening   Other benign tumor/mass:  Unclear  o Thyroid nodules palpated clinically but not seen on imaging thereafter   Pancreatitis:    No    Chemoprevention Use   No   Letrozole - only took for 5 days in 12/2021 for fertility    Focused Surgical History   Reproductive organs intact    Past Medical History:   Diagnosis Date    Abnormal Pap smear of cervix     Abnormal Pap smear of vagina     hx LEEP    Primary hypothyroidism 06/09/2014    TPO +    Thyroid nodule 6/9/2014     Patient Active Problem List    Diagnosis Date Noted    Calcific tendinitis of left shoulder 09/27/2021    Family history of breast cancer 05/14/2015    History of abnormal cervical Pap smear 05/14/2015    Hypothyroidism due to Hashimoto's thyroiditis 06/09/2014    Thyroid nodule 06/09/2014      Ancestry   Ashkenazi Roman Catholic ancestry:    No    Focused Family History   Consanguinity in ancestors:    No   Germline cancer genetic testing    in blood relatives:    No    Family Cancer Pedigree      No known history of cancer in maternal first cousins, paternal first cousins (no longer close), or extended family other than noted.  No known family history of colon polyps other than noted.    Family History   Problem Relation Age of Onset    Breast cancer Paternal Grandmother 58        inflammatory    Breast cancer Mother 44        unilat, s/p, chemo, unk if TNBC, no germline testing    Other Father         born with 1 kidney    Scoliosis Daughter         tested negative for Charcot-Esther-Tooth    Scoliosis Son     Charcot-Esther-Tooth disease Son         from his father    Skin cancer Maternal Aunt         pt believes, ?type, nose (lived most of life in [place]_    No Known Problems Daughter     No Known Problems Other     No Known Problems Other     No Known Problems Other     Colon cancer Neg Hx     Ovarian cancer Neg Hx     Allergic rhinitis Neg Hx      Allergies Neg Hx     Angioedema Neg Hx     Asthma Neg Hx     Atopy Neg Hx     Eczema Neg Hx     Immunodeficiency Neg Hx     Rhinitis Neg Hx     Urticaria Neg Hx     Cancer Neg Hx       Review of Systems   See HPI.      OBJECTIVE      Physical Exam  Significantly limited secondary to the inherent nature of a virtual visit.  Very pleasant patient.  Constitutional       Appearance:  She appears to be in no distress.   Neurological     Mental Status:  She is alert and oriented.  Psychiatric        Mood and Affect:  She has a normal mood and affect.     Thought Content:  Thought content is normal.         Speech:  Speech is normal.     Behavior:  Behavior is normal.     Judgment:  Judgment is normal.   Genetics-specific     It is my assessment that the patient is ready to proceed with cancer-genetic testing from a psychosocial perspective.    COUNSELING      Causes of cancer    Germline cancer genetic testing is the testing of genes associated with cancer, known as cancer susceptibility genes.  Just as these genes are inherited from parents, mutations in these genes can be inherited, as well.  A mutation in a cancer susceptibility gene adversely affects the gene's ability to prevent cancer; therefore, carriers of cancer susceptibility gene mutations may be at increased risk for certain cancers.    Only a small percentage of cancers are caused by a cancer susceptibility gene mutation, meaning the cancer is genetic/hereditary; rather, most cancers are sporadic.  Causes of sporadic cancers may include environmental risk factors, lifestyle risk factors, and non-modifiable risk factors.  It is important to note that members of a family often share not only their genetics but also risk factors including environmental and lifestyle risk factors, so cancers can be familial.    Potential results of genetic testing, and their implications    Potential results of genetic testing include positive, negative, and variant of  unknown significance (VUS).     A positive result indicates the presence of at least one clinically significant mutation, and the patient's associated cancer risks vary depending upon the cancer susceptibility gene(s) in which there is/are a mutation(s).  With a positive result, in some cases, depending upon the specific result and the patient's clinical history, modified risk management may be recommended, including measures for risk reduction and/or surveillance; however, modified management is not always an option.     A negative result indicates that no clinically significant mutations were identified in the gene(s) tested.     A VUS indicates that there is not presently enough data for the laboratory to make a determination as to whether the variant is clinically significant.  VUSs are not typically acted upon clinically.      The ability to interpret the meaning of a negative genetic testing result in genes associated with cancer with which the patient has not personally been affected, when done prior to testing the appropriate affected relative(s), is significantly limited.  A negative result in the patient does not indicate that she cannot develop cancer, and, in fact, the patient may even be at increased risk for cancer based on shared risk factors with affected relatives.  The patient stated her mother does not want to personally undergo genetic testing.    Genetic mutation inheritance    If Juan tests positive for a mutation, her first-degree relatives would each have a 50% chance of having the same mutation, and other, more distantly related blood-relatives would also be at risk of having the same mutation.       Genetic discrimination    The Genetic Information Nondiscrimination Act (ADAIR) is U[place]S. federal legislation that provides some protections against use of an individual's genetic information by their health insurer and by their employer.  Title I of ADAIR prohibits most health insurers from  utilizing an individual's genetic information to make decisions regarding insurance eligibility or premium charges.  Title II of ADAIR prohibits covered entities, which includes employers, from requesting the genetic information of employees and applicants.  ADAIR does not protect individuals from genetic discrimination toward health insurance obtained through a job with the  or through the Federal Employees Health Benefits Plan; from genetic discrimination by employers with fewer than 15 employees; or from genetic discrimination by any other type of policies/entities, including but not limited to life insurance, disability insurance, long-term care insurance,  benefits, and [facility] benefits.     Genetic testing logistics    Blood or saliva is used for testing.      Post-test genetic counseling can be conducted once the genetic testing results are available.     Assessment/Plan    Juan's family history is not strongly suggestive of a hereditary cancer syndrome in the family; nonetheless, she does meet criteria for breast cancer susceptibility gene testing, and I do recommend such for her from a clinical standpoint.    Offered Juan germline cancer-genetic testing at this time versus deferring testing at this time or declining testing altogether.  Patient elects to defer genetic testing for hereditary cancer syndromes at this time, citing concern regarding genetic discrimination, and was advised to notify me if she desires to proceed in the future; she has my contact information.    Will send Juan a questionnaire via [facility] so that I can assess her breast cancer risk and see if supplemental breast cancer screenings and referral to [facility] are indicated.    Questions were encouraged and answered to the patient's satisfaction, and she verbalized understanding of information and agreement with the plan.       ASSESSMENT/PLAN      Juan was seen today for genetic  evaluation.    Diagnoses and all orders for this visit:    Encounter for nonprocreative genetic counseling  Genetic cancer risk assessment and pre-test cancer genetic counseling were conducted.  The patient stated her mother does not want to personally undergo genetic testing.  Juan's family history is not strongly suggestive of a hereditary cancer syndrome in the family; nonetheless, she does meet criteria for breast cancer susceptibility gene testing, and I do recommend such for her from a clinical standpoint.  Patient elects to defer genetic testing for hereditary cancer syndromes at this time, citing concern regarding genetic discrimination, and was advised to notify me if she desires to proceed in the future.    Family history of breast cancer  -     Ambulatory referral/consult to Genetics:  Consult completed today.  - Sent Juan a questionnaire via [facility] so that I can assess her breast cancer risk and see if supplemental breast cancer screenings and referral to [facility] are indicated.    For possible family history of skin cancer, I recommend annual total-body skin exams for Juan.    Follow-up:  Follow up in about 1 year (around 1/13/2023) for re-discussion regarding genetic testing, if the patient does not reach out sooner to test.    Jose Manuel Garza, DNP, APRN, FNP-BC, AOCNP  Nurse Practitioner, Hereditary/High Risk Clinic  Hematology/Oncology, [facility]

## 2022-02-23 DIAGNOSIS — D84.9 IMMUNOSUPPRESSED STATUS: ICD-10-CM

## 2022-03-10 ENCOUNTER — PATIENT MESSAGE (OUTPATIENT)
Dept: RHEUMATOLOGY | Facility: CLINIC | Age: 42
End: 2022-03-10
Payer: MEDICAID

## 2022-03-29 ENCOUNTER — TELEPHONE (OUTPATIENT)
Dept: FAMILY MEDICINE | Facility: CLINIC | Age: 42
End: 2022-03-29
Payer: MEDICAID

## 2022-03-29 ENCOUNTER — PATIENT MESSAGE (OUTPATIENT)
Dept: ENDOCRINOLOGY | Facility: CLINIC | Age: 42
End: 2022-03-29
Payer: MEDICAID

## 2022-03-29 DIAGNOSIS — E03.8 HYPOTHYROIDISM DUE TO HASHIMOTO'S THYROIDITIS: Primary | ICD-10-CM

## 2022-03-29 DIAGNOSIS — E06.3 HYPOTHYROIDISM DUE TO HASHIMOTO'S THYROIDITIS: Primary | ICD-10-CM

## 2022-03-29 NOTE — TELEPHONE ENCOUNTER
----- Message from Evelin Melo sent at 3/28/2022  6:02 PM CDT -----  Contact: Patient  Patient call in regarding rash in face since nov spreading to different area in the face also painful bumps on her nose mainly     Patient requesting to see a dermatology    No referral on active request    Please advice     Patient can be reach at [phone]

## 2022-03-29 NOTE — TELEPHONE ENCOUNTER
We dont do all those labs as they are not recommended.   If would like to come in and discuss we could do that    We could orders TSH, Ft4, T3 if we should like  Alternative medicine doctors sometimes order the other labs as they have supplements that often times address the other labs.

## 2022-03-29 NOTE — TELEPHONE ENCOUNTER
Spoke with pt scheduled her an appointment with Mrs. Cohn on tomorrow. Pt verbalized understanding phone call ended.

## 2022-03-30 NOTE — TELEPHONE ENCOUNTER
Our system does not allow us to order a TPO if they already had one as it is not recommended. This is partially due to the lab tube shortage but may continue indefinitely   This decision is from the system lab group as well as the Department of endocrinology.

## 2022-03-31 ENCOUNTER — TELEPHONE (OUTPATIENT)
Dept: FAMILY MEDICINE | Facility: CLINIC | Age: 42
End: 2022-03-31
Payer: MEDICAID

## 2022-03-31 ENCOUNTER — PATIENT MESSAGE (OUTPATIENT)
Dept: DERMATOLOGY | Facility: CLINIC | Age: 42
End: 2022-03-31
Payer: MEDICAID

## 2022-03-31 ENCOUNTER — OFFICE VISIT (OUTPATIENT)
Dept: FAMILY MEDICINE | Facility: CLINIC | Age: 42
End: 2022-03-31
Payer: MEDICAID

## 2022-03-31 DIAGNOSIS — R21 RASH OF FACE: Primary | ICD-10-CM

## 2022-03-31 PROCEDURE — 99214 OFFICE O/P EST MOD 30 MIN: CPT | Mod: 95,,, | Performed by: PHYSICIAN ASSISTANT

## 2022-03-31 PROCEDURE — 99214 PR OFFICE/OUTPT VISIT, EST, LEVL IV, 30-39 MIN: ICD-10-PCS | Mod: 95,,, | Performed by: PHYSICIAN ASSISTANT

## 2022-03-31 NOTE — TELEPHONE ENCOUNTER
Called pt and informed her that Dr. NEWTON is not accepting any new medicaid pts. Gave her resources for board certified derms that do accept medicaid

## 2022-03-31 NOTE — PROGRESS NOTES
Subjective:       Patient ID: Juan Ramirez is a 41 y.o. female     The patient location is: [place]  The chief complaint leading to consultation is:  rash    Visit type: audiovisual    Face to Face time with patient:  20 minutes  Twenty minutes of total time spent on the encounter, which includes face to face time and non-face to face time preparing to see the patient (eg, review of tests), Obtaining and/or reviewing separately obtained history, Documenting clinical information in the electronic or other health record, Independently interpreting results (not separately reported) and communicating results to the patient/family/caregiver, or Care coordination (not separately reported).         Each patient to whom he or she provides medical services by telemedicine is:  (1) informed of the relationship between the physician and patient and the respective role of any other health care provider with respect to management of the patient; and (2) notified that he or she may decline to receive medical services by telemedicine and may withdraw from such care at any time.    Notes:       Chief Complaint: No chief complaint on file.    HPI  Patient's  PCP: Bia Abad MD.  The patient's last visit with me was on 4/18/2019  Patient's past medical history includes:  hypothyroidism    The patient presents today on a virtual visit.  She has a history of a recurrent rash on her nose and cheeks.  The rash is occasionally painful and itchy and sometimes she has pustules on it.  She saw rheumatology for this problem last summer and her labs did not indicated lupus or any other rheumatologic conditions.  When she had the ralph last year she stopped eating all fast food and cleaned up her diet.  She at fast food once recently and the rash began soon after.     Review of Systems   Constitutional: Negative for fatigue and fever.   HENT: Negative for congestion, rhinorrhea and sore throat.    Eyes: Negative for pain.   Respiratory: Negative  for cough and shortness of breath.    Gastrointestinal: Negative for diarrhea and vomiting.   Skin: Positive for rash.        Objective:   Physical Exam  Constitutional:       General: She is not in acute distress.     Appearance: She is well-developed. She is not diaphoretic.   HENT:      Head: Normocephalic and atraumatic.   Neck:      Thyroid: No thyromegaly.   Cardiovascular:      Rate and Rhythm: Normal rate.   Pulmonary:      Effort: No respiratory distress.   Musculoskeletal:         General: No tenderness or deformity. Normal range of motion.      Cervical back: Normal range of motion and neck supple.   Skin:     Findings: Erythema (present across the nose) and rash present. No lesion.   Neurological:      Mental Status: She is alert and oriented to person, place, and time.      Deep Tendon Reflexes: Reflexes are normal and symmetric.   Psychiatric:         Behavior: Behavior normal.         Thought Content: Thought content normal.         Judgment: Judgment normal.          Assessment:       1. Rash of face  Ambulatory referral/consult to Dermatology    C-reactive protein    Sedimentation rate        Plan:       Rash of face  -     Ambulatory referral/consult to Dermatology; Future; Expected date: 04/07/2022  -     C-reactive protein; Future; Expected date: 03/31/2022  -     Sedimentation rate; Future; Expected date: 03/31/2022         Follow up if symptoms worsen or fail to improve.       Suki Andrew PA-C   03/31/2022   1:25 PM

## 2022-04-01 ENCOUNTER — LAB VISIT (OUTPATIENT)
Dept: LAB | Facility: OTHER | Age: 42
End: 2022-04-01
Payer: MEDICAID

## 2022-04-01 DIAGNOSIS — E03.8 HYPOTHYROIDISM DUE TO HASHIMOTO'S THYROIDITIS: ICD-10-CM

## 2022-04-01 DIAGNOSIS — L65.9 HAIR LOSS: ICD-10-CM

## 2022-04-01 DIAGNOSIS — R21 RASH OF FACE: ICD-10-CM

## 2022-04-01 DIAGNOSIS — E06.3 HYPOTHYROIDISM DUE TO HASHIMOTO'S THYROIDITIS: ICD-10-CM

## 2022-04-01 LAB
CRP SERPL-MCNC: 0.3 MG/L (ref 0–8.2)
ERYTHROCYTE [SEDIMENTATION RATE] IN BLOOD: 6 MM/HR (ref 0–20)
T3 SERPL-MCNC: 89 NG/DL (ref 60–180)
T4 FREE SERPL-MCNC: 1.13 NG/DL (ref 0.71–1.51)
T4 FREE SERPL-MCNC: 1.13 NG/DL (ref 0.71–1.51)
TSH SERPL DL<=0.005 MIU/L-ACNC: 2.43 UIU/ML (ref 0.4–4)
TSH SERPL DL<=0.005 MIU/L-ACNC: 2.43 UIU/ML (ref 0.4–4)

## 2022-04-01 PROCEDURE — 84439 ASSAY OF FREE THYROXINE: CPT | Performed by: INTERNAL MEDICINE

## 2022-04-01 PROCEDURE — 84443 ASSAY THYROID STIM HORMONE: CPT | Performed by: INTERNAL MEDICINE

## 2022-04-01 PROCEDURE — 86140 C-REACTIVE PROTEIN: CPT | Performed by: PHYSICIAN ASSISTANT

## 2022-04-01 PROCEDURE — 85651 RBC SED RATE NONAUTOMATED: CPT | Performed by: PHYSICIAN ASSISTANT

## 2022-04-01 PROCEDURE — 84480 ASSAY TRIIODOTHYRONINE (T3): CPT | Performed by: INTERNAL MEDICINE

## 2022-04-01 PROCEDURE — 36415 COLL VENOUS BLD VENIPUNCTURE: CPT | Performed by: INTERNAL MEDICINE

## 2022-05-30 ENCOUNTER — TELEPHONE (OUTPATIENT)
Dept: DERMATOLOGY | Facility: CLINIC | Age: 42
End: 2022-05-30
Payer: MEDICAID

## 2022-05-30 NOTE — TELEPHONE ENCOUNTER
----- Message from Letty Hu sent at 5/27/2022  8:54 AM CDT -----      Prior patient of Dr. Salazar patient is now covered by Medicaid.  She is asking to be scheduled    Patient can be contacted @#  [phone]

## 2022-06-02 ENCOUNTER — OFFICE VISIT (OUTPATIENT)
Dept: FAMILY MEDICINE | Facility: CLINIC | Age: 42
End: 2022-06-02
Payer: MEDICAID

## 2022-06-02 ENCOUNTER — LAB VISIT (OUTPATIENT)
Dept: FAMILY MEDICINE | Facility: CLINIC | Age: 42
End: 2022-06-02
Payer: MEDICAID

## 2022-06-02 ENCOUNTER — TELEPHONE (OUTPATIENT)
Dept: FAMILY MEDICINE | Facility: CLINIC | Age: 42
End: 2022-06-02
Payer: MEDICAID

## 2022-06-02 ENCOUNTER — TELEPHONE (OUTPATIENT)
Dept: FAMILY MEDICINE | Facility: CLINIC | Age: 42
End: 2022-06-02

## 2022-06-02 ENCOUNTER — PATIENT MESSAGE (OUTPATIENT)
Dept: FAMILY MEDICINE | Facility: CLINIC | Age: 42
End: 2022-06-02

## 2022-06-02 DIAGNOSIS — J02.9 SORE THROAT: ICD-10-CM

## 2022-06-02 DIAGNOSIS — J02.9 SORE THROAT: Primary | ICD-10-CM

## 2022-06-02 LAB
CTP QC/QA: YES
S PYO RRNA THROAT QL PROBE: NEGATIVE

## 2022-06-02 PROCEDURE — U0003 INFECTIOUS AGENT DETECTION BY NUCLEIC ACID (DNA OR RNA); SEVERE ACUTE RESPIRATORY SYNDROME CORONAVIRUS 2 (SARS-COV-2) (CORONAVIRUS DISEASE [COVID-19]), AMPLIFIED PROBE TECHNIQUE, MAKING USE OF HIGH THROUGHPUT TECHNOLOGIES AS DESCRIBED BY CMS-2020-01-R: HCPCS | Performed by: NURSE PRACTITIONER

## 2022-06-02 PROCEDURE — U0005 INFEC AGEN DETEC AMPLI PROBE: HCPCS | Performed by: NURSE PRACTITIONER

## 2022-06-02 PROCEDURE — 99213 PR OFFICE/OUTPT VISIT, EST, LEVL III, 20-29 MIN: ICD-10-PCS | Mod: 95,25,, | Performed by: NURSE PRACTITIONER

## 2022-06-02 PROCEDURE — 99213 OFFICE O/P EST LOW 20 MIN: CPT | Mod: 95,25,, | Performed by: NURSE PRACTITIONER

## 2022-06-02 NOTE — TELEPHONE ENCOUNTER
----- Message from Shaniqua Arellano sent at 6/2/2022  7:57 AM CDT -----  Contact: PAtient  Type:  Same Day Appointment Request    Caller is requesting a same day appointment.  Caller declined first available appointment listed below.      Name of Caller:  Patient     When is the first available appointment N/A    Symptoms: Sore throat     Best Call Back Number: [phone] (home)     Additional Information:  patient needs to be seen today due to a sore throat

## 2022-06-02 NOTE — PROGRESS NOTES
Juan Ramirez is a 41 y.o. female patient of Bia Abad MD who presents to the clinic today for a Virtual Visit.    The patient location is: [place], [place]  The chief complaint leading to consultation is: sore throat  Visit type: audiovisual  Total time spent with patient: 20 minutes.  Each patient to whom he or she provides medical services by telemedicine is:  (1) informed of the relationship between the physician and patient and the respective role of any other health care provider with respect to management of the patient; and (2) notified that he or she may decline to receive medical services by telemedicine and may withdraw from such care at any time.  24 minutes of total time spent on the encounter, which includes face to face time and non-face to face time preparing to see the patient (eg, review of tests), Obtaining and/or reviewing separately obtained history, Documenting clinical information in the electronic or other health record, Independently interpreting results (not separately reported) and communicating results to the patient/family/caregiver, or Care coordination (not separately reported).     HPI    Pt, who is not known to me, reports a new problem to me: exposure last Tuedsay (8 days) since exposure. But this smornng very ST, glands swollen, left ear pain.     Answers for HPI/ROS submitted by the patient on 6/2/2022  Chronicity: new  Onset: today  Progression since onset: unchanged  Pain worse on: neither  Fever: no fever  Pain - numeric: 8/10  abdominal pain: No  congestion: No  cough: No  diarrhea: No  drooling: No  ear discharge: No  ear pain: Yes  headaches: No  hoarse voice: No  neck pain: No  plugged ear sensation: No  shortness of breath: No  stridor: No  swollen glands: Yes  trouble swallowing: Yes  vomiting: No  Treatments tried: nothing  Improvement on treatment: no relief  Pain severity: moderate    These symptoms began today--awoke with ST today--very sore.  Ear hurts and swollen  glands.  Hurts to swallow.  Not really having any sinus/nasal symptoms and no cough.    Symptoms are + acute.    Pt denies the following symptoms:  Fever, n/v/d, cold sxs    Aggravating factors include swallowing .    Relieving factors include none .    OTC Medications tried are none.    Prescription medications taken for symptoms are none.    Pertinent medical history:  Known exposure to others who had tested + for Covid..  Has had 2 Covid immunization.    Smoking status:  Former smoker    ROS    Constitutional:   No  fever, no fatigue.    Head:   No headache  Ears:   + pain.  Eyes:  No sxs  Nose:   No sinus pain, no congestion, no runny nose.  Throat:  ++ ST pain, + difficulty swallowing r/t pain.    Heart:  No palpitations, chest pain.    Lungs:  No difficulty breathing, no coughing.    GI/:  No sxs    MS:  No new bone, joint or muscle problems.    Skin:  No rashes, itching.    Past Medical History:   Diagnosis Date    Abnormal Pap smear of cervix     Abnormal Pap smear of vagina     hx LEEP    Primary hypothyroidism 06/09/2014    TPO +    Thyroid nodule 6/9/2014       Current Outpatient Medications:     (Magic mouthwash) 1:1:1 diphenhydramine(Benadryl) 12.5mg/5ml liq, aluminum & magnesium hydroxide-simethicone (Maalox), LIDOcaine viscous 2%, Swish and spit 10 mLs every 4 (four) hours as needed (sore throat)., Disp: 90 mL, Rfl: 0    letrozole (FEMARA) 2.5 mg Tab, Take 1 tablet (2.5 mg total) by mouth once daily., Disp: 5 tablet, Rfl: 0    letrozole (FEMARA) 2.5 mg Tab, Take 1 tablet (2.5 mg total) by mouth once daily Take on cycle days 3-7., Disp: 5 tablet, Rfl: 0    TIROSINT 13 mcg Cap, TAKE ONE CAPSULE BY MOUTH EVERY DAY WITH THE 50MCG, Disp: 30 capsule, Rfl: 6    TIROSINT 50 mcg Cap, TAKE ONE CAPSULE BY MOUTH EVERY DAY, Disp: 30 capsule, Rfl: 1      PHYSICAL EXAM    There were no vitals filed for this visit.  Vital signs not taken per patient.  Patient's questionnaire (if available), medications &  PMH all reviewed today.    Gen:  Alert, coop 41 y.o. female patient in no acute distress, is mildly ill-appearing.           Well developed, well-nourished  Psych:   Behavior and affect appropriate for the situation and setting.  HENT:   Normocephalic, atraumatic.  Symmetrical facial movements.    Eyes without visible discharge or swelling.  No pain on palpation to frontal sinuses on patient self-exam.  No pain on palpation to maxillary sinuses on patient self-exam.  + cervical lymph nodes tender to palpation on patient self-exam.  No sneezing during the visit.  Voice steady, no hoarseness noted.  Resp:   Respiratory effort symmetrical.  No retractions  No increased work of breathing  No audible wheezes  Talks in full sentences.  No coughing during the interaction today.  CV:   No piyush oral cyanosis  MSK:  Head and upper extremity movements smooth and even.  Neuro:  Responds promptly to questions showing good insight.  Skin:   No observed rashes/bruises    Sore throat  -     POCT Rapid Strep A  -     COVID-19 Routine Screening; Future; Expected date: 06/02/2022          Pt today presents with report of awakening with ST today after known exposure to Covid 8 days ago. .  Additionally, she has not been ill prior to this time.    She is able to go to [facility] [place] to get swabbed for strep and Covid.  This is a new problem to me and the following work up is planned.    Lab/Radiological/Pulmonary/CardiacTests Ordered per above.  The results are pending.  The xray was read by radiologist. .    Pt advised to perform comfort measures recommended on patient instruction sheet, which were reviewed at the time of the visit..    If not better in 3 days, the patient is advised to call here, PCP office or go for an in-person/follow up evaluation.  If worse or concerns, the patient is advised to call for advise to this office or the PCP office or call [facility] or go to the nearest URGENT CARE or ER.  Explained exam findings,  diagnosis and treatment plan to patient.  Questions answered and patient states understanding.

## 2022-06-02 NOTE — PROGRESS NOTES
Juan Ramirez is a 41 y.o. female patient of Bia Abad MD who presents to the clinic today for a Virtual Visit.    The patient location is: ***  The chief complaint leading to consultation is: ***  Visit type: {TELE AUDIOVISUAL:61019}  Total time spent with patient: *** minutes.  Each patient to whom he or she provides medical services by telemedicine is:  (1) informed of the relationship between the physician and patient and the respective role of any other health care provider with respect to management of the patient; and (2) notified that he or she may decline to receive medical services by telemedicine and may withdraw from such care at any time.    *** minutes of total time spent on the encounter, which includes face to face time and non-face to face time preparing to see the patient (eg, review of tests), Obtaining and/or reviewing separately obtained history, Documenting clinical information in the electronic or other health record, Independently interpreting results (not separately reported) and communicating results to the patient/family/caregiver, or Care coordination (not separately reported).     HPI    Pt, who is *** known to me, reports a *** problem to me: *** .    ***    These symptoms began ***  ago and status is ***.     Symptoms are *** acute  *** exac of chronic ***.    Pt denies the following symptoms:  ***    Aggravating factors include *** .    Relieving factors include *** .    OTC Medications tried are ***.    Prescription medications taken for symptoms are ***.    Pertinent medical history:  ***.    Smoking status:  ***    ROS    Constitutional:   ***  fever, *** fatigue, *** in appetite.    Head:   *** headache  Ears:   *** pain.  Eyes:  No sxs  Nose:   *** sinus pain, *** congestion, *** runny nose, *** post nasal drip.  Throat:  *** ST pain, *** difficulty swallowing ***.    Heart:  No palpitations, chest pain.    Lungs:  *** difficulty breathing, *** coughing, *** sputum  production, *** wheezing.              Symptoms are *** acquired.    GI/:  No sxs    MS:  No new bone, joint or muscle problems.    Skin:  No rashes, itching.    Past Medical History:   Diagnosis Date    Abnormal Pap smear of cervix     Abnormal Pap smear of vagina     hx LEEP    Primary hypothyroidism 06/09/2014    TPO +    Thyroid nodule 6/9/2014       Current Outpatient Medications:     (Magic mouthwash) 1:1:1 diphenhydramine(Benadryl) 12.5mg/5ml liq, aluminum & magnesium hydroxide-simethicone (Maalox), LIDOcaine viscous 2%, Swish and spit 10 mLs every 4 (four) hours as needed (sore throat)., Disp: 90 mL, Rfl: 0    letrozole (FEMARA) 2.5 mg Tab, Take 1 tablet (2.5 mg total) by mouth once daily., Disp: 5 tablet, Rfl: 0    letrozole (FEMARA) 2.5 mg Tab, Take 1 tablet (2.5 mg total) by mouth once daily Take on cycle days 3-7., Disp: 5 tablet, Rfl: 0    TIROSINT 13 mcg Cap, TAKE ONE CAPSULE BY MOUTH EVERY DAY WITH THE 50MCG, Disp: 30 capsule, Rfl: 6    TIROSINT 50 mcg Cap, TAKE ONE CAPSULE BY MOUTH EVERY DAY, Disp: 30 capsule, Rfl: 1      PHYSICAL EXAM    There were no vitals filed for this visit.  Vital signs not taken per patient.  Patient's questionnaire (if available), medications & PMH all reviewed today.    Gen:  Alert, coop 41 y.o. female patient in *** distress ***, is *** ill-appearing.           Well developed, well-nourished  Psych:   Behavior and affect appropriate for the situation and setting.  HENT:   Normocephalic, atraumatic.  Symmetrical facial movements.    Eyes without visible discharge or swelling.  *** pain on palpation to frontal sinuses on patient self-exam.  *** pain on palpation to maxillary sinuses on patient self-exam.  *** cervical lymph nodes tender to palpation on patient self-exam.  No sneezing during the visit.  Voice steady, no hoarseness noted.  Resp:   Respiratory effort symmetrical.  No retractions  No increased work of breathing  No audible wheezes  Talks in full  sentences.  *** coughing during the interaction today.  CV:   No piyush oral cyanosis  MSK:  Head and upper extremity movements smooth and even.  Neuro:  Responds promptly to questions showing good insight.  Skin:   No observed rashes/bruises        Pt today presents with *** .  Additionally, ***      This is a new problem to me.  No work up is planned.    OR    This is a new problem to me and the following work up is planned.    Lab/Radiological/Pulmonary/CardiacTests Ordered per above.  The results are ***.  The xray was read by radiologist.    Referred to *** .    Pt advised to perform comfort measures recommended on patient instruction sheet, which were reviewed at the time of the visit..    If not better in *** days, the patient is advised to call here, PCP office or go for an in-person/follow up evaluation.  If worse or concerns, the patient is advised to call for advise to this office or the PCP office or call [facility] or go to the nearest URGENT CARE or ER.  Explained exam findings, diagnosis and treatment plan to patient ***.  Questions answered and patient states understanding.

## 2022-06-03 ENCOUNTER — PATIENT MESSAGE (OUTPATIENT)
Dept: FAMILY MEDICINE | Facility: CLINIC | Age: 42
End: 2022-06-03
Payer: MEDICAID

## 2022-06-03 LAB
SARS-COV-2 RNA RESP QL NAA+PROBE: NOT DETECTED
SARS-COV-2- CYCLE NUMBER: NORMAL

## 2022-06-17 ENCOUNTER — PATIENT MESSAGE (OUTPATIENT)
Dept: DERMATOLOGY | Facility: CLINIC | Age: 42
End: 2022-06-17
Payer: MEDICAID

## 2022-06-28 ENCOUNTER — TELEPHONE (OUTPATIENT)
Dept: FAMILY MEDICINE | Facility: CLINIC | Age: 42
End: 2022-06-28
Payer: MEDICAID

## 2022-06-28 ENCOUNTER — CLINICAL SUPPORT (OUTPATIENT)
Dept: REHABILITATION | Facility: HOSPITAL | Age: 42
End: 2022-06-28
Attending: PHYSICAL MEDICINE & REHABILITATION
Payer: MEDICAID

## 2022-06-28 DIAGNOSIS — R29.898 SHOULDER WEAKNESS: ICD-10-CM

## 2022-06-28 DIAGNOSIS — M25.612 DECREASED ROM OF LEFT SHOULDER: ICD-10-CM

## 2022-06-28 DIAGNOSIS — M25.512 CHRONIC LEFT SHOULDER PAIN: ICD-10-CM

## 2022-06-28 DIAGNOSIS — G89.29 CHRONIC LEFT SHOULDER PAIN: ICD-10-CM

## 2022-06-28 DIAGNOSIS — M75.32 CALCIFIC TENDINITIS OF LEFT SHOULDER REGION: ICD-10-CM

## 2022-06-28 PROCEDURE — 97161 PT EVAL LOW COMPLEX 20 MIN: CPT | Mod: PO

## 2022-06-28 PROCEDURE — 97110 THERAPEUTIC EXERCISES: CPT | Mod: PO

## 2022-06-28 RX ORDER — METRONIDAZOLE 10 MG/G
GEL TOPICAL DAILY
Qty: 60 G | Refills: 0 | Status: SHIPPED | OUTPATIENT
Start: 2022-06-28 | End: 2023-08-07 | Stop reason: SDUPTHER

## 2022-06-28 NOTE — TELEPHONE ENCOUNTER
Pt said she has had that medication before, 4 years ago,when she had a break out . She said she used the sulfur wash and the metro gel 1% and it helped her clear out. She has an appointment with September with dermatology. She is wondering if dr henley can prescribe it to her again to help her.

## 2022-06-28 NOTE — TELEPHONE ENCOUNTER
----- Message from Ángel Meyers sent at 6/28/2022  9:00 AM CDT -----  Contact: self  Type: Needs Medical Advice  Best Call Back Number: [phone] (home)   Additional Information: Patient got a missed call nobody logged anything about a missed call she did reach out yesterday about prescription. Pt wants to know if Dr. Abad or nurses tried to reach out if so she would like a call back. Thanks.

## 2022-06-28 NOTE — PLAN OF CARE
[facility]  Physical Therapy Initial Evaluation      Date: 6/28/2022   Name: Juan Ramirez  Clinic Number: [id number]    Therapy Diagnosis:   Encounter Diagnoses   Name Primary?    Calcific tendinitis of left shoulder region     Chronic left shoulder pain     Shoulder weakness     Decreased ROM of left shoulder      Physician: Phill Fried, *    Physician Orders: PT Eval and Treat   Medical Diagnosis from Referral: M75.32 (ICD-10-CM) - Calcific tendinitis of left shoulder region     Evaluation Date: 6/28/2022  Authorization Period Expiration: 9/27/22  Plan of Care Expiration: 8/15/22  Progress Note Due: 7/26//22  Visit # / Visits authorized: 1/ 1   FOTO: 46%/ Eval     Precautions: Standard    Time In: 0800  Time Out: 0845  Total Appointment Time (timed & untimed codes): 45 minutes    Subjective   Date of onset: off and on for a couple of years, increasing over the last few months  History of current condition - Juan reports: diffuse left shoulder pain and pain into the left upper trap pain , Pain has been increasing with restricted motion over the past few months, some increase in weakness with pain. She is having difficulty with performing ADLs due to restricted moption    _       _    Past Medical History:   Diagnosis Date    Abnormal Pap smear of cervix     Abnormal Pap smear of vagina     hx LEEP    Primary hypothyroidism 06/09/2014    TPO +    Thyroid nodule 6/9/2014     Juan Ramirez  has a past surgical history that includes Cervical biopsy w/ loop electrode excision (2006); Ectopic pregnancy surgery (2009); and Tonsillectomy.    Juan has a current medication list which includes the following prescription(s): letrozole, letrozole, tirosint, and tirosint.    Review of patient's allergies indicates:  No Known Allergies     Imaging, X-Ray:   FINDINGS:  No fracture or subluxation are identified.  There is a relatively dense crescent-shaped 1.8 x 0.6 cm calcification  adjacent to the greater tuberosity of the humerus within the acromial humeral space consistent with CPPD and calcific tendinitis.  The acromioclavicular and glenohumeral joints are well maintained and well aligned.     Impression:     Calcific tendinitis.        Electronically signed by: Haja Montenegro MD  Date:                                            09/27/2021  Time:                                           15:33             Exam Ended: 09/27/21 14:04             Prior Therapy: none  Social History:  lives alone  Occupation: computer work  Prior Level of Function: Independent  Current Level of Function: Independent     Pain:  Current 0/10, worst 10/10, best 0/10   Location: left shoulder   Description: Aching, Sharp, Variable and soreness  Aggravating Factors: Flexing, Lifting and reaching benhind back, reaching overhead  Easing Factors: NSAIDs,         Pts goals: decrease pain and be able to do what she used to     Objective   Mental status: oriented x3  Posture/ Alignment: Fair    ROM:     AROM Right Left Comment   Shoulder Flexion: 180 degrees 150* degrees    Shoulder Abduction: 180 degrees 95* degrees    Shoulder ER:        90 degrees         50* degrees    Shoulder Ir:         70 degrees         35* degrees    PROM Right Left Comment   Shoulder Flexion:  180 degrees    Shoulder Abduction:  170 degrees    Shoulder ER, 90°ABD:  60 degrees    Shoulder IR, 90° ABD:  45 degrees    *pain    Strength:      Right Left Comment   Shoulder flexion: 5/5 4-/5    Shoulder Abduction: 5/5 4-/5    Shoulder ER: 5/5 4-/5    Shoulder IR: 5/5 5/5    Lower Trap: 5/5 4+/5    Middle Trap: 5/5 4+/5     5/5 5/5            Special Tests:     - Neers: left positive  - Nieto-Drake: left negative    Special Tests *  Cervical radiculopathy   (-) Spurlings A, (-) Cervical Quadrant w/out radicular symptoms, (-) Neck Distraction, (-) Cervical rotation < 60 deg ipsilateral side,     Palpation:  + TTP Left upper trap,  infraspinatus, deltoid    Joint Play:  Mild hypomobility compared to right    Pt/family was provided educational information, including: role of PT, goals for PT, scheduling - pt verbalized understanding. Discussed insurance plan with pt.         CMS Impairment/Limitation/Restriction for FOTO shoulder Survey    Therapist reviewed FOTO scores for Juan Ramirez on 6/28/2022.   FOTO documents entered into [employer] - see Media section.    Limitation Score: 46%         TREATMENT   Treatment Time In: 0835  Treatment Time Out: 0845  Total Treatment time separate from Evaluation: 10 minutes    Juan received therapeutic exercises to develop ROM and flexibility for 10 minutes including:  Left shoulder ROM x 6 min  Shoulder internal/external rotation : manual resistance x 10      Home Exercises and Patient Education Provided    Education provided:   - progression of treatment  - importance of ROM  - possible benefits of manual therapy and dry needling    Written Home Exercises Provided: yes.  Exercises were reviewed and Juan was able to demonstrate them prior to the end of the session.  Juan demonstrated good  understanding of the education provided.     See EMR under Patient Instructions for exercises provided 6/28/2022.    Assessment   Pt presents with chronic left shoulder pain that has been increasing in pain , weakness and functional limitations over the past few months to the point it is making ADLs diffucult to perform. She is demonstrating decreased left shoulder  ROM and strength with pain  Leading to decreased functional ability . She has had some shoulder pain for few years but then over the past several months she started having increased pain and limitations. She should respond well to a progressive exercise program to work on shoulder strength and ROM. We also discussed manual therapy and some dry needling to help her.       Pt prognosis is Good.   Pt will benefit from skilled outpatient Physical Therapy to  address the deficits stated above and in the chart below, provide pt/family education, and to maximize pt's level of independence.     Plan of care discussed with patient: Yes  Pt's spiritual, cultural and educational needs considered and patient is agreeable to the plan of care and goals as stated below:     Anticipated Barriers for therapy: none    Medical Necessity is demonstrated by the following  History  Co-morbidities and personal factors that may impact the plan of care Co-morbidities:   none    Personal Factors:   no deficits     low   Examination  Body Structures and Functions, activity limitations and participation restrictions that may impact the plan of care Body Regions:   upper extremities    Body Systems:    gross symmetry  ROM  strength  gross coordinated movement  balance  gait  transfers  transitions  motor control    Participation Restrictions:   none    Activity limitations:   Learning and applying knowledge  no deficits    General Tasks and Commands  no deficits    Communication  no deficits    Mobility  lifting and carrying objects    Self care  dressing    Domestic Life  doing house work (cleaning house, washing dishes, laundry)    Interactions/Relationships  no deficits    Life Areas  employment    Community and Social Life  community life  recreation and leisure         low   Clinical Presentation stable and uncomplicated low   Decision Making/ Complexity Score: low     Goals:  Short Term Goals: 3-4 weeks   Increase shoulder flex/Abd AROM by 25 degrees or more  Increase shoulder external rotation AROM  by 20 degrees or more  Increase external rotation strength by 1/2 grade   Increased shoulder Abd strength by 1/2 grade  Decrease pain with ADLs by 2 points or more    Long Term Goals: 8-12 weeks   Decrease pain with ADLs by 4 points   Able to do bra with left arm  Increase left shoulder ROM equal to right  Increase left shoulder ROM to 4+/5      Plan   Plan of care Certification: 6/28/2022 to  8/26/22.    Outpatient Physical Therapy 2 times weekly for 8 weeks to include the following interventions: Manual Therapy, Moist Heat/ Ice, Neuromuscular Re-ed, Patient Education, Self Care, Therapeutic Activities, Therapeutic Exercise and dry needling.     Jose Reece, PT      I CERTIFY THE NEED FOR THESE SERVICES FURNISHED UNDER THIS PLAN OF TREATMENT AND WHILE UNDER MY CARE   Physician's comments:     Physician's Signature: ___________________________________________________

## 2022-07-01 ENCOUNTER — CLINICAL SUPPORT (OUTPATIENT)
Dept: REHABILITATION | Facility: HOSPITAL | Age: 42
End: 2022-07-01
Attending: PHYSICAL MEDICINE & REHABILITATION
Payer: MEDICAID

## 2022-07-01 DIAGNOSIS — R29.898 SHOULDER WEAKNESS: ICD-10-CM

## 2022-07-01 DIAGNOSIS — M25.512 ACUTE PAIN OF LEFT SHOULDER: Primary | ICD-10-CM

## 2022-07-01 DIAGNOSIS — M25.612 DECREASED ROM OF LEFT SHOULDER: ICD-10-CM

## 2022-07-01 PROCEDURE — 97110 THERAPEUTIC EXERCISES: CPT | Mod: PO,CQ

## 2022-07-01 NOTE — PROGRESS NOTES
Physical Therapy Daily Treatment Note     Name: Juan Ramirez  [facility] Number: [id number]    Therapy Diagnosis:   Encounter Diagnoses   Name Primary?    Acute pain of left shoulder Yes    Shoulder weakness     Decreased ROM of left shoulder      Physician: Phill Fried, *    Visit Date: 7/1/2022  Physician Orders: PT Eval and Treat   Medical Diagnosis from Referral: M75.32 (ICD-10-CM) - Calcific tendinitis of left shoulder region      Evaluation Date: 6/28/2022  Authorization Period Expiration: 9/27/22  Plan of Care Expiration: 8/15/22  Progress Note Due: 7/26//22  Visit # / Visits authorized: 1/ 20  FOTO: 46%/ Eval     Time In: 1200  Time Out: 1243  Total Billable Time: 43 minutes    Precautions: Standard    Subjective     Pt reports: her shoulder is doing well today, just a little achy. 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  She was compliant with home exercise program.  Response to previous treatment: no adverse effect   Functional change: too soon to determine     Pain: 1/10  Location: left shoulder      Objective     Juan received therapeutic exercises to develop strength, endurance, ROM, flexibility, posture and core stabilization for 38 minutes including:  UBE x 5 minutes   Left shoulder PROM in all directions   Shoulder internal/external rotation : manual resistance x 10  AAROM: Shoulder flexion with dowel x 10  AAROM: shoulder ER with dowel x 10  Supine serratus punch 2x10    S/L ER 3x10  S/L abduction 2x10  Seated bruegger  2x10  Standing rows (Red TB) 2x10  Standing extensions (Red TB) 2x10  Pulley: scaption x 2 minutes     Juan received the following manual therapy techniques: Joint mobilizations were applied to the: (L) shoulder for 05 minutes, including:  Glenohumeral joint mobilizations     Home Exercises Provided and Patient Education Provided     Education provided:   - progression of treatment  - importance of ROM  -  possible benefits of manual therapy and dry needling    Written Home Exercises Provided: Patient instructed to cont prior HEP.  Exercises were reviewed and Juan was able to demonstrate them prior to the end of the session.  Juan demonstrated good  understanding of the education provided.     See EMR under Patient Instructions for exercises provided 06/28/2022.    Assessment     Juan demonstrates decreased ER ROM with pain prior to reaching end range of motion. Patient able to progress to light rotator cuff and piyush-scapular strengthening exercises without increasing pain. ER strengthening challenging for patient. Cues provided for proper scapular retraction rather than upper trap activation.   Juan Is progressing well towards her goals.   Pt prognosis is Good.     Pt will continue to benefit from skilled outpatient physical therapy to address the deficits listed in the problem list box on initial evaluation, provide pt/family education and to maximize pt's level of independence in the home and community environment.     Pt's spiritual, cultural and educational needs considered and pt agreeable to plan of care and goals.    Anticipated barriers to physical therapy: none    Goals:   Short Term Goals: 3-4 weeks   Increase shoulder flex/Abd AROM by 25 degrees or more  Increase shoulder external rotation AROM  by 20 degrees or more  Increase external rotation strength by 1/2 grade   Increased shoulder Abd strength by 1/2 grade  Decrease pain with ADLs by 2 points or more     Long Term Goals: 8-12 weeks   Decrease pain with ADLs by 4 points   Able to do bra with left arm  Increase left shoulder ROM equal to right  Increase left shoulder ROM to 4+/5    Plan     Continue current POC with emphasis on decreasing (L) shoulder pain and restoring function.     Emely Antunez, PTA

## 2022-07-05 ENCOUNTER — CLINICAL SUPPORT (OUTPATIENT)
Dept: REHABILITATION | Facility: HOSPITAL | Age: 42
End: 2022-07-05
Attending: PHYSICAL MEDICINE & REHABILITATION
Payer: MEDICAID

## 2022-07-05 DIAGNOSIS — M25.512 ACUTE PAIN OF LEFT SHOULDER: Primary | ICD-10-CM

## 2022-07-05 DIAGNOSIS — M25.612 DECREASED ROM OF LEFT SHOULDER: ICD-10-CM

## 2022-07-05 DIAGNOSIS — R29.898 SHOULDER WEAKNESS: ICD-10-CM

## 2022-07-05 PROCEDURE — 97110 THERAPEUTIC EXERCISES: CPT | Mod: PO,CQ

## 2022-07-05 NOTE — PROGRESS NOTES
Physical Therapy Daily Treatment Note     Name: Juan Ramirez  [facility] Number: [id number]    Therapy Diagnosis:   Encounter Diagnoses   Name Primary?    Acute pain of left shoulder Yes    Shoulder weakness     Decreased ROM of left shoulder      Physician: Phill Fried, *    Visit Date: 7/5/2022  Physician Orders: PT Eval and Treat   Medical Diagnosis from Referral: M75.32 (ICD-10-CM) - Calcific tendinitis of left shoulder region      Evaluation Date: 6/28/2022  Authorization Period Expiration: 9/27/22  Plan of Care Expiration: 8/15/22  Progress Note Due: 7/26//22  Visit # / Visits authorized: 1/ 20  FOTO: 46%/ Eval     Time In: 1530  Time Out: 1615  Total Billable Time: 30 minutes    Precautions: Standard    Subjective     Pt reports: her shoulder is doing okay this afternoon. Patient reports no increased pain/soreness following last treatment session. She was compliant with home exercise program.  Response to previous treatment: no adverse effect   Functional change: too soon to determine     Pain: 1/10  Location: left shoulder      Objective     Juan received therapeutic exercises to develop strength, endurance, ROM, flexibility, posture and core stabilization for 38 minutes including:  UBE x 5 minutes   Left shoulder PROM in all directions   Shoulder internal/external rotation : manual resistance x 10  AAROM: Shoulder flexion with dowel 2 x 10  AAROM: shoulder ER with dowel x 10  Supine serratus punch 2x10    S/L ER 3x10  S/L abduction 2x10  Seated bruegger 2x10  Standing rows (Red TB) 2x10  Standing extensions (Red TB) 2x10  Pulley: scaption x 2 minutes     Juan received the following manual therapy techniques: Joint mobilizations were applied to the: (L) shoulder for 05 minutes, including:  Glenohumeral joint mobilizations     Home Exercises Provided and Patient Education Provided     Education provided:   - progression of treatment  - importance of ROM  - possible benefits of manual therapy and  dry needling    Written Home Exercises Provided: Patient instructed to cont prior HEP.  Exercises were reviewed and Juan was able to demonstrate them prior to the end of the session.  Juan demonstrated good  understanding of the education provided.     See EMR under Patient Instructions for exercises provided 06/28/2022.    Assessment     Juan demonstrates improvements into flexion and abduction today but ER remains limited. Patient able to perform stabilization exercises without complaints of pain. Will continue to progress ROM and strength exercises based on patient's tolerance.     Juan Is progressing well towards her goals.   Pt prognosis is Good.     Pt will continue to benefit from skilled outpatient physical therapy to address the deficits listed in the problem list box on initial evaluation, provide pt/family education and to maximize pt's level of independence in the home and community environment.     Pt's spiritual, cultural and educational needs considered and pt agreeable to plan of care and goals.    Anticipated barriers to physical therapy: none    Goals:   Short Term Goals: 3-4 weeks   Increase shoulder flex/Abd AROM by 25 degrees or more  Increase shoulder external rotation AROM  by 20 degrees or more  Increase external rotation strength by 1/2 grade   Increased shoulder Abd strength by 1/2 grade  Decrease pain with ADLs by 2 points or more     Long Term Goals: 8-12 weeks   Decrease pain with ADLs by 4 points   Able to do bra with left arm  Increase left shoulder ROM equal to right  Increase left shoulder ROM to 4+/5    Plan     Continue current POC with emphasis on decreasing (L) shoulder pain and restoring function.     Emely Antunez, PTA

## 2022-07-07 ENCOUNTER — CLINICAL SUPPORT (OUTPATIENT)
Dept: REHABILITATION | Facility: HOSPITAL | Age: 42
End: 2022-07-07
Attending: PHYSICAL MEDICINE & REHABILITATION
Payer: MEDICAID

## 2022-07-07 DIAGNOSIS — M25.612 DECREASED ROM OF LEFT SHOULDER: ICD-10-CM

## 2022-07-07 DIAGNOSIS — M25.512 ACUTE PAIN OF LEFT SHOULDER: Primary | ICD-10-CM

## 2022-07-07 DIAGNOSIS — R29.898 SHOULDER WEAKNESS: ICD-10-CM

## 2022-07-07 PROCEDURE — 97110 THERAPEUTIC EXERCISES: CPT | Mod: PO

## 2022-07-07 PROCEDURE — 97140 MANUAL THERAPY 1/> REGIONS: CPT | Mod: PO

## 2022-07-07 NOTE — PROGRESS NOTES
Physical Therapy Daily Treatment Note     Name: Juan Ramirez  Clinic Number: [id number]    Therapy Diagnosis:   Encounter Diagnoses   Name Primary?    Acute pain of left shoulder Yes    Shoulder weakness     Decreased ROM of left shoulder      Physician: Phill Fried, *    Visit Date: 7/7/2022  Physician Orders: PT Eval and Treat   Medical Diagnosis from Referral: M75.32 (ICD-10-CM) - Calcific tendinitis of left shoulder region      Evaluation Date: 6/28/2022  Authorization Period Expiration: 9/27/22  Plan of Care Expiration: 8/15/22  Progress Note Due: 7/26//22  Visit # / Visits authorized: 2/ 20  FOTO: 46%/ Eval    Time In:1645  Time Out: 1730  Total Billable Time: 30 minutes    Precautions: Standard    Subjective     Pt reports: she is feeling ok today with only some mild shoulder pain. She has done well with her exercises in clinic. She would like to do some manual therapy and dry needling today.     . She was compliant with home exercise program.  Response to previous treatment: no adverse effect   Functional change: too soon to determine     Pain: 1/10  Location: left shoulder      Objective     Juan received therapeutic exercises to develop strength, endurance, ROM, flexibility, posture and core stabilization for 10 minutes including:  Left shoulder PROM in all directions   Shoulder internal/external rotation : manual resistance x 10  Passive upper trap stretch    Juan received the following manual therapy techniques: Joint mobilizations were applied to the: (L) shoulder for 30 minutes, including:  Glenohumeral joint mobilizations   Soft tissue mobilization: upper trap, levator scap, infraspinatus and deltoid      Soft Tissue Palpation Assessment to determine the necessity for Functional Dry Needling    .   Patient provided written and verbal consent to FDN.   FDN performed to left upper trap, infraspinatus and deltoid with electrical stimulation    Home Exercises Provided and Patient Education  Provided     Education provided:   - progression of treatment  - importance of ROM    Written Home Exercises Provided: Patient instructed to cont prior HEP.  Exercises were reviewed and Juan was able to demonstrate them prior to the end of the session.  Juan demonstrated good  understanding of the education provided.     See EMR under Patient Instructions for exercises provided 06/28/2022.    Assessment     Juan did well with her treatment tpday with increased shoulder ROM with decreased pain. She will continue on current HEP and I will see how she does next visit.     Patient demonstrated appropriate response to FDN.   Patient with improved overall tissue mobility with decreased tissue tension and trigger points upon palpation of treated muscle groups after Functional Dry Needling.      Juan Is progressing well towards her goals.   Pt prognosis is Good.     Pt will continue to benefit from skilled outpatient physical therapy to address the deficits listed in the problem list box on initial evaluation, provide pt/family education and to maximize pt's level of independence in the home and community environment.     Pt's spiritual, cultural and educational needs considered and pt agreeable to plan of care and goals.    Anticipated barriers to physical therapy: none    Goals:   Short Term Goals: 3-4 weeks   Increase shoulder flex/Abd AROM by 25 degrees or more  Increase shoulder external rotation AROM  by 20 degrees or more  Increase external rotation strength by 1/2 grade   Increased shoulder Abd strength by 1/2 grade  Decrease pain with ADLs by 2 points or more     Long Term Goals: 8-12 weeks   Decrease pain with ADLs by 4 points   Able to do bra with left arm  Increase left shoulder ROM equal to right  Increase left shoulder ROM to 4+/5    Plan     Continue current POC with emphasis on decreasing (L) shoulder pain and restoring function.     Jose Reece, PT

## 2022-07-12 ENCOUNTER — HOSPITAL ENCOUNTER (OUTPATIENT)
Dept: RADIOLOGY | Facility: HOSPITAL | Age: 42
Discharge: HOME | End: 2022-07-12
Attending: FAMILY MEDICINE
Payer: MEDICAID

## 2022-07-12 ENCOUNTER — CLINICAL SUPPORT (OUTPATIENT)
Dept: REHABILITATION | Facility: HOSPITAL | Age: 42
End: 2022-07-12
Attending: PHYSICAL MEDICINE & REHABILITATION
Payer: MEDICAID

## 2022-07-12 DIAGNOSIS — M25.612 DECREASED ROM OF LEFT SHOULDER: ICD-10-CM

## 2022-07-12 DIAGNOSIS — R29.898 SHOULDER WEAKNESS: ICD-10-CM

## 2022-07-12 DIAGNOSIS — M25.512 ACUTE PAIN OF LEFT SHOULDER: Primary | ICD-10-CM

## 2022-07-12 DIAGNOSIS — Z12.31 ENCOUNTER FOR SCREENING MAMMOGRAM FOR MALIGNANT NEOPLASM OF BREAST: ICD-10-CM

## 2022-07-12 PROCEDURE — 77063 MAMMO DIGITAL SCREENING BILAT WITH TOMO: ICD-10-PCS | Mod: 26,,, | Performed by: RADIOLOGY

## 2022-07-12 PROCEDURE — 77067 SCR MAMMO BI INCL CAD: CPT | Mod: 26,,, | Performed by: RADIOLOGY

## 2022-07-12 PROCEDURE — 97110 THERAPEUTIC EXERCISES: CPT | Mod: PO

## 2022-07-12 PROCEDURE — 77067 MAMMO DIGITAL SCREENING BILAT WITH TOMO: ICD-10-PCS | Mod: 26,,, | Performed by: RADIOLOGY

## 2022-07-12 PROCEDURE — 77063 BREAST TOMOSYNTHESIS BI: CPT | Mod: 26,,, | Performed by: RADIOLOGY

## 2022-07-12 PROCEDURE — 77063 BREAST TOMOSYNTHESIS BI: CPT | Mod: TC,PO

## 2022-07-12 NOTE — PROGRESS NOTES
Physical Therapy Daily Treatment Note     Name: Juan Ramirez  [facility] Number: [id number]    Therapy Diagnosis:   Encounter Diagnoses   Name Primary?    Acute pain of left shoulder Yes    Shoulder weakness     Decreased ROM of left shoulder      Physician: Phill Fried, *    Visit Date: 7/12/2022  Physician Orders: PT Eval and Treat   Medical Diagnosis from Referral: M75.32 (ICD-10-CM) - Calcific tendinitis of left shoulder region      Evaluation Date: 6/28/2022  Authorization Period Expiration: 9/27/22  Plan of Care Expiration: 8/15/22  Progress Note Due: 7/26//22  Visit # / Visits authorized: 3/ 20  FOTO: 46%/ Eval    Time In: 1350  Time Out: 1430  Total Billable Time: 40 minutes    Precautions: Standard    Subjective     Pt reports: she is having some pain down into her left arm that she wasn't before. It's not bad but just started to notice it. She does continue to have improve shoulder ROM since last visit.     . She was compliant with home exercise program.  Response to previous treatment: improved shoulder ROM  Functional change: no changes     Pain: 1/10  Location: left shoulder      Objective     Juan received therapeutic exercises to develop strength, endurance, ROM, flexibility, posture and core stabilization for 40 minutes including:  UBE: 3 min forward 1 min backwards  Left shoulder PROM in all directions x 5  Shoulder internal/external rotation : manual resistance x 10  Passive upper trap stretch x 3 min  Supine horizontal abd: yellow theraband x 15  Supine shoulder flexion: 3lb PVC x 15  Supine serratus punches x 15, 3lbs PVC  Pull downs : yellow theraband x 10  Rows: x 15 red therband  Supine dynamic stability: 4 x 15 sec: 100/110 degrees,   Passive D2 Mobility x 15      Home Exercises Provided and Patient Education Provided     Education provided:   - progression of treatment  - importance of ROM  - pain can move as her shoulder starts to move more    Written Home Exercises Provided:  Patient instructed to cont prior HEP.  Exercises were reviewed and Juan was able to demonstrate them prior to the end of the session.  Juan demonstrated good  understanding of the education provided.     See EMR under Patient Instructions for exercises provided 06/28/2022.    Assessment     Juan did well with her treatment last visit and has been able to maintain her shoulder ROM. She is having some new pain to left upper arm which is most likely due to beging able to move her arm more now. She did well her exercises with some pain in upper arm but it was mild in nature.     Juan Is progressing well towards her goals.   Pt prognosis is Good.     Pt will continue to benefit from skilled outpatient physical therapy to address the deficits listed in the problem list box on initial evaluation, provide pt/family education and to maximize pt's level of independence in the home and community environment.     Pt's spiritual, cultural and educational needs considered and pt agreeable to plan of care and goals.    Anticipated barriers to physical therapy: none    Goals:   Short Term Goals: 3-4 weeks   Increase shoulder flex/Abd AROM by 25 degrees or more: met  Increase shoulder external rotation AROM  by 20 degrees or more: met  Increase external rotation strength by 1/2 grade , progressing, not met  Increased shoulder Abd strength by 1/2 grade, progressing, not met  Decrease pain with ADLs by 2 points or more, progressing, not met     Long Term Goals: 8-12 weeks   Decrease pain with ADLs by 4 points , progressing, not met  Able to do bra with left arm, progressing, not met  Increase left shoulder ROM equal to right, progressing, not met  Increase left shoulder ROM to 4+/5, progressing, not met    Plan     Continue current POC with emphasis on decreasing (L) shoulder pain and restoring function.     Jose Reece, PT

## 2022-07-19 ENCOUNTER — CLINICAL SUPPORT (OUTPATIENT)
Dept: REHABILITATION | Facility: HOSPITAL | Age: 42
End: 2022-07-19
Attending: PHYSICAL MEDICINE & REHABILITATION
Payer: MEDICAID

## 2022-07-19 DIAGNOSIS — M25.512 ACUTE PAIN OF LEFT SHOULDER: Primary | ICD-10-CM

## 2022-07-19 DIAGNOSIS — M25.612 DECREASED ROM OF LEFT SHOULDER: ICD-10-CM

## 2022-07-19 DIAGNOSIS — R29.898 SHOULDER WEAKNESS: ICD-10-CM

## 2022-07-19 PROCEDURE — 97110 THERAPEUTIC EXERCISES: CPT | Mod: PO

## 2022-07-19 NOTE — PROGRESS NOTES
Physical Therapy Daily Treatment Note     Name: Juan Ramirez  [facility] Number: [id number]    Therapy Diagnosis:   Encounter Diagnoses   Name Primary?    Acute pain of left shoulder Yes    Shoulder weakness     Decreased ROM of left shoulder      Physician: Phill Fried, *    Visit Date: 7/19/2022  Physician Orders: PT Eval and Treat   Medical Diagnosis from Referral: M75.32 (ICD-10-CM) - Calcific tendinitis of left shoulder region      Evaluation Date: 6/28/2022  Authorization Period Expiration: 9/27/22  Plan of Care Expiration: 8/15/22  Progress Note Due: 7/26//22  Visit # / Visits authorized: 4/ 20  FOTO: 46%/ Eval    Time In: 1600  Time Out: 1645  Total Billable Time: 40 minutes    Precautions: Standard    Subjective     Pt reports:her upper trap area feels better but she is still having some pain in the left upper arm area and some sharp pain when reaching for seatbelt .    . She was compliant with home exercise program.  Response to previous treatment: improved shoulder ROM  Functional change: no changes     Pain: 1/10  Location: left shoulder      Objective     Juan received therapeutic exercises to develop strength, endurance, ROM, flexibility, posture and core stabilization for 40 minutes including:  UBE: 3 min forward 3 min backwards  Left shoulder PROM in all directions x 5  Shoulder internal/external rotation : manual resistance x 10  Passive upper trap stretch x 3 min  Supine horizontal abd: yellow theraband x 15  Supine shoulder flexion: 2lb dumbell x 15  Supine serratus punches x 15, 3lbs dumbell   sidelying shoulder Abd 2lbs x 10  Pull downs : yellow theraband x 10  Rows:2 x 10: 25lbs:   Supine dynamic stability: 4 x 15 sec: 100/110 degrees,   Passive D2 Mobility x 15: active      Home Exercises Provided and Patient Education Provided     Education provided:   - progression of treatment  - importance of ROM    Written Home Exercises Provided: Patient instructed to cont prior  HEP.  Exercises were reviewed and Juan was able to demonstrate them prior to the end of the session.  Juan demonstrated good  understanding of the education provided.     See EMR under Patient Instructions for exercises provided 06/28/2022.    Assessment     Juan is having some pain to lateral upper arm/shoulder area that I will keep an eye on as it can get flared up. She did well with her exercises today and will will just take some time but will continue to evaluate.     Juan Is progressing well towards her goals.   Pt prognosis is Good.     Pt will continue to benefit from skilled outpatient physical therapy to address the deficits listed in the problem list box on initial evaluation, provide pt/family education and to maximize pt's level of independence in the home and community environment.     Pt's spiritual, cultural and educational needs considered and pt agreeable to plan of care and goals.    Anticipated barriers to physical therapy: none    Goals:   Short Term Goals: 3-4 weeks   Increase shoulder flex/Abd AROM by 25 degrees or more: met  Increase shoulder external rotation AROM  by 20 degrees or more: met  Increase external rotation strength by 1/2 grade , progressing, not met  Increased shoulder Abd strength by 1/2 grade, progressing, not met  Decrease pain with ADLs by 2 points or more, progressing, not met     Long Term Goals: 8-12 weeks   Decrease pain with ADLs by 4 points , progressing, not met  Able to do bra with left arm, progressing, not met  Increase left shoulder ROM equal to right, progressing, not met  Increase left shoulder ROM to 4+/5, progressing, not met    Plan     Continue current POC with emphasis on decreasing (L) shoulder pain and restoring function.     Jose Reece, PT

## 2022-07-21 ENCOUNTER — CLINICAL SUPPORT (OUTPATIENT)
Dept: REHABILITATION | Facility: HOSPITAL | Age: 42
End: 2022-07-21
Attending: PHYSICAL MEDICINE & REHABILITATION
Payer: MEDICAID

## 2022-07-21 DIAGNOSIS — R29.898 SHOULDER WEAKNESS: ICD-10-CM

## 2022-07-21 DIAGNOSIS — M25.612 DECREASED ROM OF LEFT SHOULDER: ICD-10-CM

## 2022-07-21 DIAGNOSIS — M25.512 ACUTE PAIN OF LEFT SHOULDER: Primary | ICD-10-CM

## 2022-07-21 PROCEDURE — 97110 THERAPEUTIC EXERCISES: CPT | Mod: PO

## 2022-07-21 NOTE — PROGRESS NOTES
Physical Therapy Daily Treatment Note     Name: Juan Ramirez  [facility] Number: [id number]    Therapy Diagnosis:   Encounter Diagnoses   Name Primary?    Acute pain of left shoulder Yes    Shoulder weakness     Decreased ROM of left shoulder      Physician: Phill Fried, *    Visit Date: 7/21/2022  Physician Orders: PT Eval and Treat   Medical Diagnosis from Referral: M75.32 (ICD-10-CM) - Calcific tendinitis of left shoulder region      Evaluation Date: 6/28/2022  Authorization Period Expiration: 9/27/22  Plan of Care Expiration: 8/15/22  Progress Note Due: 7/26//22  Visit # / Visits authorized: 5/ 20  FOTO: 46%/ Eval    Time In: 1645  Time Out: 1730  Total Billable Time: 40 minutes    Precautions: Standard    Subjective     Pt reports:she had some mild soreness after last visit but not bad.    . She was compliant with home exercise program.  Response to previous treatment: improved shoulder ROM  Functional change: no changes     Pain: 1/10  Location: left shoulder      Objective     Juan received therapeutic exercises to develop strength, endurance, ROM, flexibility, posture and core stabilization for 40 minutes including:  UBE: 3 min forward 3 min backwards  Left shoulder PROM in all directions x 5  Shoulder internal/external rotation : manual resistance x 10  Passive upper trap stretch x 3 min  Supine horizontal abd: yellow theraband x 15  Supine shoulder flexion: 2lb dumbell x 15  Supine serratus punches x 15, 3lbs dumbell   sidelying shoulder Abd 2lbs x 15  Pull downs : yellow theraband x 10  Rows:2 x 10: 25lbs:   Supine dynamic stability: 4 x 15 sec: 100/110 degrees,   Passive D2 Mobility x 15: active      Home Exercises Provided and Patient Education Provided     Education provided:   - progression of treatment  - importance of ROM    Written Home Exercises Provided: Patient instructed to cont prior HEP.  Exercises were reviewed and Juan was able to demonstrate them prior to the end of the  session.  Juan demonstrated good  understanding of the education provided.     See EMR under Patient Instructions for exercises provided 06/28/2022.    Assessment     Juan continues to have ROM  limitations and pain with external rotation that has not been improving. Her overall ROM and strength is improving and I am hopeful that external rotation will improve soon. I have been able to increase her exercises recently without increasing her pain.    Juan Is progressing well towards her goals.   Pt prognosis is Good.     Pt will continue to benefit from skilled outpatient physical therapy to address the deficits listed in the problem list box on initial evaluation, provide pt/family education and to maximize pt's level of independence in the home and community environment.     Pt's spiritual, cultural and educational needs considered and pt agreeable to plan of care and goals.    Anticipated barriers to physical therapy: none    Goals:   Short Term Goals: 3-4 weeks   Increase shoulder flex/Abd AROM by 25 degrees or more: met  Increase shoulder external rotation AROM  by 20 degrees or more: met  Increase external rotation strength by 1/2 grade , progressing, not met  Increased shoulder Abd strength by 1/2 grade, progressing, not met  Decrease pain with ADLs by 2 points or more, progressing, not met     Long Term Goals: 8-12 weeks   Decrease pain with ADLs by 4 points , progressing, not met  Able to do bra with left arm, progressing, not met  Increase left shoulder ROM equal to right, progressing, not met  Increase left shoulder ROM to 4+/5, progressing, not met    Plan     Continue current POC with emphasis on decreasing (L) shoulder pain and restoring function.     Jose Reece, PT

## 2022-07-26 ENCOUNTER — CLINICAL SUPPORT (OUTPATIENT)
Dept: REHABILITATION | Facility: HOSPITAL | Age: 42
End: 2022-07-26
Attending: PHYSICAL MEDICINE & REHABILITATION
Payer: MEDICAID

## 2022-07-26 DIAGNOSIS — M25.512 ACUTE PAIN OF LEFT SHOULDER: Primary | ICD-10-CM

## 2022-07-26 DIAGNOSIS — R29.898 SHOULDER WEAKNESS: ICD-10-CM

## 2022-07-26 DIAGNOSIS — M25.612 DECREASED ROM OF LEFT SHOULDER: ICD-10-CM

## 2022-07-26 PROCEDURE — 97110 THERAPEUTIC EXERCISES: CPT | Mod: PO

## 2022-08-01 ENCOUNTER — TELEPHONE (OUTPATIENT)
Dept: PHYSICAL MEDICINE AND REHAB | Facility: CLINIC | Age: 42
End: 2022-08-01
Payer: MEDICAID

## 2022-08-01 ENCOUNTER — CLINICAL SUPPORT (OUTPATIENT)
Dept: REHABILITATION | Facility: HOSPITAL | Age: 42
End: 2022-08-01
Attending: PHYSICAL MEDICINE & REHABILITATION
Payer: MEDICAID

## 2022-08-01 DIAGNOSIS — M25.612 DECREASED ROM OF LEFT SHOULDER: ICD-10-CM

## 2022-08-01 DIAGNOSIS — R29.898 SHOULDER WEAKNESS: ICD-10-CM

## 2022-08-01 DIAGNOSIS — M25.512 ACUTE PAIN OF LEFT SHOULDER: Primary | ICD-10-CM

## 2022-08-01 PROCEDURE — 97110 THERAPEUTIC EXERCISES: CPT | Mod: PO

## 2022-08-01 NOTE — PROGRESS NOTES
Physical Therapy Daily Treatment Note     Name: Juan Ramirez  [facility] Number: [id number]    Therapy Diagnosis:   Encounter Diagnoses   Name Primary?    Acute pain of left shoulder Yes    Shoulder weakness     Decreased ROM of left shoulder      Physician: Phill Fried, *    Visit Date: 8/1/2022  Physician Orders: PT Eval and Treat   Medical Diagnosis from Referral: M75.32 (ICD-10-CM) - Calcific tendinitis of left shoulder region      Evaluation Date: 6/28/2022  Authorization Period Expiration: 9/27/22  Plan of Care Expiration: 8/15/22  Progress Note Due: 7/26//22  Visit # / Visits authorized: 7/ 20  FOTO: 46%/ Eval    Time In: 1524  Time Out: 1600  Total Billable Time: 36 minutes    Precautions: Standard    Subjective     Pt reports: her shoulder has been feeling about the same and she is having pain into lateral and anterior upper arm and feels she is noticing it even more with some activities now    . She was compliant with home exercise program.  Response to previous treatment: some mild soreness  Functional change: no changes     Pain: 1/10  Location: left shoulder      Objective     Juan received therapeutic exercises to develop strength, endurance, ROM, flexibility, posture and core stabilization for 36 minutes including:  UBE: 3 min forward 3 min backwards  Left shoulder PROM in all directions x 5  Shoulder internal/external rotation : manual resistance x 10  Passive upper trap stretch x 3 min  Seated red abd: yellow theraband x 10  Supine shoulder flexion: 2lb dumbell x 5  Supine serratus punches x 15, 3lbs dumbell   sidelying shoulder Abd 2lbs x 15  Pull downs : yellow theraband x 10: not performed  Rows:2 x 10: 25lbs:not performed   Supine dynamic stability: 4 x 15 sec: 100/110 degrees,   Passive D2 Mobility x 15: active      Home Exercises Provided and Patient Education Provided     Education provided:   - progression of treatment  - importance of ROM    Written Home Exercises Provided:  Patient instructed to cont prior HEP.  Exercises were reviewed and Juan was able to demonstrate them prior to the end of the session.  Juan demonstrated good  understanding of the education provided.     See EMR under Patient Instructions for exercises provided 06/28/2022.    Assessment     Juan continues to have pain that hasn't changed much over the past couple of weeks so I am going to decrease her exercise load this week to 1 visit and I will see her next week to see how she is doing. I suggested she follow up with referring provider.     Hima Is progressing well towards her goals.   Pt prognosis is Good.     Pt will continue to benefit from skilled outpatient physical therapy to address the deficits listed in the problem list box on initial evaluation, provide pt/family education and to maximize pt's level of independence in the home and community environment.     Pt's spiritual, cultural and educational needs considered and pt agreeable to plan of care and goals.    Anticipated barriers to physical therapy: none    Goals:   Short Term Goals: 3-4 weeks   Increase shoulder flex/Abd AROM by 25 degrees or more: met  Increase shoulder external rotation AROM  by 20 degrees or more: met  Increase external rotation strength by 1/2 grade , progressing, not met  Increased shoulder Abd strength by 1/2 grade, progressing, not met  Decrease pain with ADLs by 2 points or more, progressing, not met     Long Term Goals: 8-12 weeks   Decrease pain with ADLs by 4 points , progressing, not met  Able to do bra with left arm, progressing, not met  Increase left shoulder ROM equal to right, progressing, not met  Increase left shoulder ROM to 4+/5, progressing, not met    Plan     Continue current POC with emphasis on decreasing (L) shoulder pain and restoring function.     Jose Reece, PT

## 2022-08-01 NOTE — TELEPHONE ENCOUNTER
----- Message from Phoebe Rendon sent at 8/1/2022  4:08 PM CDT -----  Regarding: Follow Up after PT visits  Pt would like to schedule a follow up visit for shoulder pain currently being treated with physical therapy.  Pt states that she hasn't seen Dr. Fried in about a year or so, but wants to see if anything else can be done to help her.    Contact # in chart confirmed.  Please contact patient to schedule. Unable to see schedule to make appt at registration desk.    Thank you.

## 2022-08-02 ENCOUNTER — OFFICE VISIT (OUTPATIENT)
Dept: RHEUMATOLOGY | Facility: CLINIC | Age: 42
End: 2022-08-02
Payer: MEDICAID

## 2022-08-02 DIAGNOSIS — L53.9 ERYTHEMA: Primary | ICD-10-CM

## 2022-08-02 DIAGNOSIS — M79.10 MYALGIA: ICD-10-CM

## 2022-08-02 DIAGNOSIS — E06.3 HYPOTHYROIDISM DUE TO HASHIMOTO'S THYROIDITIS: ICD-10-CM

## 2022-08-02 DIAGNOSIS — E04.1 THYROID NODULE: ICD-10-CM

## 2022-08-02 DIAGNOSIS — L71.9 ROSACEA: ICD-10-CM

## 2022-08-02 DIAGNOSIS — E03.8 HYPOTHYROIDISM DUE TO HASHIMOTO'S THYROIDITIS: ICD-10-CM

## 2022-08-02 PROCEDURE — 99214 PR OFFICE/OUTPT VISIT, EST, LEVL IV, 30-39 MIN: ICD-10-PCS | Mod: 95,,, | Performed by: INTERNAL MEDICINE

## 2022-08-02 PROCEDURE — 99214 OFFICE O/P EST MOD 30 MIN: CPT | Mod: 95,,, | Performed by: INTERNAL MEDICINE

## 2022-08-02 RX ORDER — IVERMECTIN 10 MG/G
1 CREAM TOPICAL NIGHTLY
Qty: 30 G | Refills: 3 | Status: SHIPPED | OUTPATIENT
Start: 2022-08-02 | End: 2023-08-07

## 2022-08-03 NOTE — PROGRESS NOTES
Subjective:       Patient ID: Juan Ramirez is a 41 y.o. female.    Chief Complaint: No chief complaint on file.    Follow up:42 yo female hashmoto's thyroiditis and lumbar sacral pain she had what looks malar rash four years ago she resolved  but cleaning up diet. She had to gut her house and she did it herself.  Nov flare on her face, improved with  Metrogel. She started Doing fair, she had an episode of erythem,a on her breast and back, May 1, 2021,  Does not itch, she has had many change.             She reports no joint swelling. Pertinent negatives include no dysuria, fatigue, fever, trouble swallowing, myalgias or headaches.         Review of Systems   Constitutional: Negative for activity change, appetite change, chills, diaphoresis, fatigue, fever and unexpected weight change.   HENT: Negative for congestion, dental problem, ear discharge, ear pain, facial swelling, mouth sores, nosebleeds, postnasal drip, rhinorrhea, sinus pressure, sneezing, sore throat, tinnitus, trouble swallowing and voice change.    Eyes: Negative for photophobia, pain, discharge, redness and itching.   Respiratory: Negative for apnea, cough, chest tightness, shortness of breath and wheezing.    Cardiovascular: Negative for chest pain, palpitations and leg swelling.   Gastrointestinal: Negative for abdominal distention, abdominal pain, constipation, diarrhea, nausea and vomiting.   Endocrine: Negative for cold intolerance, heat intolerance, polydipsia and polyuria.   Genitourinary: Negative for decreased urine volume, difficulty urinating, dysuria, flank pain, frequency, hematuria and urgency.   Musculoskeletal: Negative for arthralgias, back pain, gait problem, joint swelling, myalgias, neck pain and neck stiffness.   Skin: Negative for pallor, rash and wound.   Allergic/Immunologic: Negative for immunocompromised state.   Neurological: Negative for dizziness, tremors, weakness, numbness and headaches.   Hematological: Negative for  adenopathy. Does not bruise/bleed easily.   Psychiatric/Behavioral: Negative for sleep disturbance. The patient is not nervous/anxious.          Objective:   LMP 07/05/2022      Physical Exam   Constitutional: She is oriented to person, place, and time.   Neurological: She is alert and oriented to person, place, and time.   Psychiatric: Mood, affect and judgment normal.         Results for orders placed or performed in visit on 06/02/22   COVID-19 Routine Screening   Result Value Ref Range    SARS-CoV2 (COVID-19) Qualitative PCR Not Detected Not Detected   SARS-COV-2- Cycle Number   Result Value Ref Range    SARS-COV-2- Cycle Number N/A        Assessment:       1. Erythema    2. Thyroid nodule    3. Hypothyroidism due to Hashimoto's thyroiditis    4. Myalgia    5. Rosacea            Plan:       Diagnoses and all orders for this visit:    Erythema  -     Ambulatory referral/consult to Allergy; Future  -     ivermectin (SOOLANTRA) 1 % Crea; Apply 1 ampule topically every evening.  -     CBC Auto Differential; Future  -     Comprehensive Metabolic Panel; Future  -     Sedimentation rate; Future  -     C-Reactive Protein; Future  -     Ferritin; Future  -     TSH; Future  -     Thyroid Peroxidase Antibody; Future  -     T4, Free; Future  -     T3; Future    Thyroid nodule  -     Ambulatory referral/consult to Allergy; Future  -     ivermectin (SOOLANTRA) 1 % Crea; Apply 1 ampule topically every evening.  -     CBC Auto Differential; Future  -     Comprehensive Metabolic Panel; Future  -     Sedimentation rate; Future  -     C-Reactive Protein; Future  -     Ferritin; Future  -     TSH; Future  -     Thyroid Peroxidase Antibody; Future  -     T4, Free; Future  -     T3; Future    Hypothyroidism due to Hashimoto's thyroiditis  -     Ambulatory referral/consult to Allergy; Future  -     ivermectin (SOOLANTRA) 1 % Crea; Apply 1 ampule topically every evening.  -     CBC Auto Differential; Future  -     Comprehensive  Metabolic Panel; Future  -     Sedimentation rate; Future  -     C-Reactive Protein; Future  -     Ferritin; Future  -     TSH; Future  -     Thyroid Peroxidase Antibody; Future  -     T4, Free; Future  -     T3; Future    Myalgia  -     Ambulatory referral/consult to Allergy; Future  -     ivermectin (SOOLANTRA) 1 % Crea; Apply 1 ampule topically every evening.  -     CBC Auto Differential; Future  -     Comprehensive Metabolic Panel; Future  -     Sedimentation rate; Future  -     C-Reactive Protein; Future  -     Ferritin; Future  -     TSH; Future  -     Thyroid Peroxidase Antibody; Future  -     T4, Free; Future  -     T3; Future    Rosacea  -     Ambulatory referral/consult to Allergy; Future  -     ivermectin (SOOLANTRA) 1 % Crea; Apply 1 ampule topically every evening.  -     CBC Auto Differential; Future  -     Comprehensive Metabolic Panel; Future  -     Sedimentation rate; Future  -     C-Reactive Protein; Future  -     Ferritin; Future  -     TSH; Future  -     Thyroid Peroxidase Antibody; Future  -     T4, Free; Future  -     T3; Future        More than 50% of the 35 minute encounter was spent face to face counseling the patient regarding current status and future plan of care as well as side of the medications. All questions were answered to patient's satisfaction      The patient location is: home  The chief complaint leading to consultation is: erythema nodosa    Visit type: audiovisual    Face to Face time with patient: 33   minutes of total time spent on the encounter, which includes face to face time and non-face to face time preparing to see the patient (eg, review of tests), Obtaining and/or reviewing separately obtained history, Documenting clinical information in the electronic or other health record, Independently interpreting results (not separately reported) and communicating results to the patient/family/caregiver, or Care coordination (not separately reported).         Each patient to  whom he or she provides medical services by telemedicine is:  (1) informed of the relationship between the physician and patient and the respective role of any other health care provider with respect to management of the patient; and (2) notified that he or she may decline to receive medical services by telemedicine and may withdraw from such care at any time.    Notes:

## 2022-08-05 ENCOUNTER — OFFICE VISIT (OUTPATIENT)
Dept: FAMILY MEDICINE | Facility: CLINIC | Age: 42
End: 2022-08-05
Payer: MEDICAID

## 2022-08-05 ENCOUNTER — PATIENT MESSAGE (OUTPATIENT)
Dept: RHEUMATOLOGY | Facility: CLINIC | Age: 42
End: 2022-08-05
Payer: MEDICAID

## 2022-08-05 VITALS
HEART RATE: 62 BPM | DIASTOLIC BLOOD PRESSURE: 76 MMHG | SYSTOLIC BLOOD PRESSURE: 113 MMHG | WEIGHT: 114.19 LBS | OXYGEN SATURATION: 99 % | BODY MASS INDEX: 21.59 KG/M2

## 2022-08-05 DIAGNOSIS — L71.9 ROSACEA: ICD-10-CM

## 2022-08-05 DIAGNOSIS — R20.0 NUMBNESS: ICD-10-CM

## 2022-08-05 DIAGNOSIS — E06.3 HYPOTHYROIDISM DUE TO HASHIMOTO'S THYROIDITIS: ICD-10-CM

## 2022-08-05 DIAGNOSIS — E03.8 HYPOTHYROIDISM DUE TO HASHIMOTO'S THYROIDITIS: ICD-10-CM

## 2022-08-05 DIAGNOSIS — E78.49 OTHER HYPERLIPIDEMIA: Primary | ICD-10-CM

## 2022-08-05 DIAGNOSIS — R53.83 OTHER FATIGUE: ICD-10-CM

## 2022-08-05 PROCEDURE — 3008F PR BODY MASS INDEX (BMI) DOCUMENTED: ICD-10-PCS | Mod: CPTII,,, | Performed by: FAMILY MEDICINE

## 2022-08-05 PROCEDURE — 99214 PR OFFICE/OUTPT VISIT, EST, LEVL IV, 30-39 MIN: ICD-10-PCS | Mod: S$PBB,,, | Performed by: FAMILY MEDICINE

## 2022-08-05 PROCEDURE — 99999 PR PBB SHADOW E&M-EST. PATIENT-LVL III: CPT | Mod: PBBFAC,,, | Performed by: FAMILY MEDICINE

## 2022-08-05 PROCEDURE — 99213 OFFICE O/P EST LOW 20 MIN: CPT | Mod: PBBFAC,PO | Performed by: FAMILY MEDICINE

## 2022-08-05 PROCEDURE — 3008F BODY MASS INDEX DOCD: CPT | Mod: CPTII,,, | Performed by: FAMILY MEDICINE

## 2022-08-05 PROCEDURE — 3078F PR MOST RECENT DIASTOLIC BLOOD PRESSURE < 80 MM HG: ICD-10-PCS | Mod: CPTII,,, | Performed by: FAMILY MEDICINE

## 2022-08-05 PROCEDURE — 1159F MED LIST DOCD IN RCRD: CPT | Mod: CPTII,,, | Performed by: FAMILY MEDICINE

## 2022-08-05 PROCEDURE — 99999 PR PBB SHADOW E&M-EST. PATIENT-LVL III: ICD-10-PCS | Mod: PBBFAC,,, | Performed by: FAMILY MEDICINE

## 2022-08-05 PROCEDURE — 1159F PR MEDICATION LIST DOCUMENTED IN MEDICAL RECORD: ICD-10-PCS | Mod: CPTII,,, | Performed by: FAMILY MEDICINE

## 2022-08-05 PROCEDURE — 3074F PR MOST RECENT SYSTOLIC BLOOD PRESSURE < 130 MM HG: ICD-10-PCS | Mod: CPTII,,, | Performed by: FAMILY MEDICINE

## 2022-08-05 PROCEDURE — 3074F SYST BP LT 130 MM HG: CPT | Mod: CPTII,,, | Performed by: FAMILY MEDICINE

## 2022-08-05 PROCEDURE — 99214 OFFICE O/P EST MOD 30 MIN: CPT | Mod: S$PBB,,, | Performed by: FAMILY MEDICINE

## 2022-08-05 PROCEDURE — 3078F DIAST BP <80 MM HG: CPT | Mod: CPTII,,, | Performed by: FAMILY MEDICINE

## 2022-08-06 ENCOUNTER — LAB VISIT (OUTPATIENT)
Dept: LAB | Facility: HOSPITAL | Age: 42
End: 2022-08-06
Attending: FAMILY MEDICINE
Payer: MEDICAID

## 2022-08-06 DIAGNOSIS — R53.83 OTHER FATIGUE: ICD-10-CM

## 2022-08-06 DIAGNOSIS — R20.0 NUMBNESS: ICD-10-CM

## 2022-08-06 DIAGNOSIS — E78.49 OTHER HYPERLIPIDEMIA: ICD-10-CM

## 2022-08-06 LAB
ALBUMIN SERPL BCP-MCNC: 4 G/DL (ref 3.5–5.2)
ALP SERPL-CCNC: 45 U/L (ref 55–135)
ALT SERPL W/O P-5'-P-CCNC: 11 U/L (ref 10–44)
ANION GAP SERPL CALC-SCNC: 8 MMOL/L (ref 8–16)
AST SERPL-CCNC: 15 U/L (ref 10–40)
BASOPHILS # BLD AUTO: 0.05 K/UL (ref 0–0.2)
BASOPHILS NFR BLD: 1.1 % (ref 0–1.9)
BILIRUB SERPL-MCNC: 1 MG/DL (ref 0.1–1)
BUN SERPL-MCNC: 12 MG/DL (ref 6–20)
CALCIUM SERPL-MCNC: 9.4 MG/DL (ref 8.7–10.5)
CHLORIDE SERPL-SCNC: 105 MMOL/L (ref 95–110)
CHOLEST SERPL-MCNC: 206 MG/DL (ref 120–199)
CHOLEST/HDLC SERPL: 3 {RATIO} (ref 2–5)
CO2 SERPL-SCNC: 26 MMOL/L (ref 23–29)
CREAT SERPL-MCNC: 0.8 MG/DL (ref 0.5–1.4)
DIFFERENTIAL METHOD: ABNORMAL
EOSINOPHIL # BLD AUTO: 0.1 K/UL (ref 0–0.5)
EOSINOPHIL NFR BLD: 2.9 % (ref 0–8)
ERYTHROCYTE [DISTWIDTH] IN BLOOD BY AUTOMATED COUNT: 12 % (ref 11.5–14.5)
EST. GFR  (NO RACE VARIABLE): >60 ML/MIN/1.73 M^2
FOLATE SERPL-MCNC: 9.3 NG/ML (ref 4–24)
GLUCOSE SERPL-MCNC: 82 MG/DL (ref 70–110)
HCT VFR BLD AUTO: 41.7 % (ref 37–48.5)
HDLC SERPL-MCNC: 68 MG/DL (ref 40–75)
HDLC SERPL: 33 % (ref 20–50)
HGB BLD-MCNC: 13.7 G/DL (ref 12–16)
IMM GRANULOCYTES # BLD AUTO: 0.01 K/UL (ref 0–0.04)
IMM GRANULOCYTES NFR BLD AUTO: 0.2 % (ref 0–0.5)
LDLC SERPL CALC-MCNC: 128.8 MG/DL (ref 63–159)
LYMPHOCYTES # BLD AUTO: 1.6 K/UL (ref 1–4.8)
LYMPHOCYTES NFR BLD: 33.7 % (ref 18–48)
MCH RBC QN AUTO: 31.4 PG (ref 27–31)
MCHC RBC AUTO-ENTMCNC: 32.9 G/DL (ref 32–36)
MCV RBC AUTO: 95 FL (ref 82–98)
MONOCYTES # BLD AUTO: 0.3 K/UL (ref 0.3–1)
MONOCYTES NFR BLD: 5.3 % (ref 4–15)
NEUTROPHILS # BLD AUTO: 2.7 K/UL (ref 1.8–7.7)
NEUTROPHILS NFR BLD: 56.8 % (ref 38–73)
NONHDLC SERPL-MCNC: 138 MG/DL
NRBC BLD-RTO: 0 /100 WBC
PLATELET # BLD AUTO: 264 K/UL (ref 150–450)
PMV BLD AUTO: 11.5 FL (ref 9.2–12.9)
POTASSIUM SERPL-SCNC: 4.2 MMOL/L (ref 3.5–5.1)
PROT SERPL-MCNC: 7.2 G/DL (ref 6–8.4)
RBC # BLD AUTO: 4.37 M/UL (ref 4–5.4)
SODIUM SERPL-SCNC: 139 MMOL/L (ref 136–145)
TRIGL SERPL-MCNC: 46 MG/DL (ref 30–150)
VIT B12 SERPL-MCNC: 426 PG/ML (ref 210–950)
WBC # BLD AUTO: 4.75 K/UL (ref 3.9–12.7)

## 2022-08-06 PROCEDURE — 80053 COMPREHEN METABOLIC PANEL: CPT | Mod: 91 | Performed by: FAMILY MEDICINE

## 2022-08-06 PROCEDURE — 82746 ASSAY OF FOLIC ACID SERUM: CPT | Performed by: FAMILY MEDICINE

## 2022-08-06 PROCEDURE — 80061 LIPID PANEL: CPT | Performed by: FAMILY MEDICINE

## 2022-08-06 PROCEDURE — 85025 COMPLETE CBC W/AUTO DIFF WBC: CPT | Mod: 91 | Performed by: FAMILY MEDICINE

## 2022-08-06 PROCEDURE — 84630 ASSAY OF ZINC: CPT | Performed by: FAMILY MEDICINE

## 2022-08-06 PROCEDURE — 82607 VITAMIN B-12: CPT | Performed by: FAMILY MEDICINE

## 2022-08-06 NOTE — PROGRESS NOTES
Assessment:       1. Other hyperlipidemia    2. Rosacea    3. Hypothyroidism due to Hashimoto's thyroiditis    4. Other fatigue    5. Numbness        Plan:       Other hyperlipidemia:  Uncontrolled  -     Lipid Panel; Future; Expected date: 08/05/2022  -     Comprehensive Metabolic Panel; Future; Expected date: 08/05/2022    Rosacea:  Improved    Hypothyroidism due to Hashimoto's thyroiditis:  Stable, this is been followed by the endocrinologist    Other fatigue:  Stable  -     Comprehensive Metabolic Panel; Future; Expected date: 08/05/2022  -     CBC Auto Differential; Future; Expected date: 08/05/2022  -     ZINC; Future; Expected date: 08/05/2022    Numbness:  New problem workup needed  -     Vitamin B12; Future; Expected date: 08/05/2022  -     Folate; Future; Expected date: 08/05/2022      Will call the patient after we have the results of the test, the patient was advised to continue using the cream and follow-up with the dermatologist as directed.   Patient agreed with assessment and plan. Patient verbalized understanding.     Subjective:       Patient ID: Juan Ramirez is a 41 y.o. female.    Chief Complaint: Follow-up (Rash on face )    HPI     Rosacea:  The patient stated that is having problems with the rosacea but since she has been using the cream is doing much better, currently using Metrogel, the patient has an appointment to see the dermatologist in September.  The patient is concerned because she read about it that the rosacea can be related with some abnormalities on the GI tract and she was to make sure that everything is okay.  The patient stated that she does not have any acid reflux symptoms she does not feel bloated she does not have any abdominal discomfort at this time.  She seen the endocrinologist secondary to hypothyroidism due to Hashimoto's thyroiditis.  The patient complains of symptoms of fatigue.    Hyperlipidemia:  The last cholesterol levels were elevated, the patient is not  taking any cholesterol medication.    Numbness:  Complains of numbness and tingling sensation in extremities, the symptoms are mild, this is intermittent and not all the time.    Past medical history, past social history was reviewed and discussed with the patient.    Review of Systems   Constitutional: Positive for fatigue. Negative for activity change, appetite change and chills.   HENT: Negative for congestion and ear discharge.    Eyes: Negative for discharge and itching.   Respiratory: Negative for choking and chest tightness.    Cardiovascular: Negative for chest pain, palpitations and leg swelling.   Gastrointestinal: Negative for abdominal distention, abdominal pain and constipation.   Endocrine: Negative for cold intolerance and heat intolerance.   Genitourinary: Negative for dysuria and flank pain.   Musculoskeletal: Negative for arthralgias and back pain.   Skin: Positive for rash. Negative for pallor.   Allergic/Immunologic: Negative for environmental allergies and food allergies.   Neurological: Positive for numbness. Negative for dizziness, facial asymmetry and headaches.   Hematological: Negative for adenopathy. Does not bruise/bleed easily.   Psychiatric/Behavioral: Negative for agitation, confusion, decreased concentration and sleep disturbance.       Objective:      Physical Exam  Vitals and nursing note reviewed.   Constitutional:       General: She is not in acute distress.     Appearance: Normal appearance. She is well-developed and normal weight. She is not diaphoretic.   HENT:      Head: Normocephalic and atraumatic.      Right Ear: External ear normal.      Left Ear: External ear normal.      Nose: Nose normal.   Eyes:      General: No scleral icterus.        Right eye: No discharge.         Left eye: No discharge.      Conjunctiva/sclera: Conjunctivae normal.   Cardiovascular:      Rate and Rhythm: Normal rate and regular rhythm.      Heart sounds: Normal heart sounds. No murmur  heard.  Pulmonary:      Effort: Pulmonary effort is normal. No respiratory distress.      Breath sounds: Normal breath sounds. No wheezing.   Abdominal:      General: There is no distension.      Palpations: There is no mass.      Tenderness: There is no abdominal tenderness.   Musculoskeletal:         General: No tenderness or deformity.      Cervical back: Neck supple.   Skin:     Findings: Rash (Facial with erythema) present. No erythema.   Neurological:      Mental Status: She is alert.      Cranial Nerves: No cranial nerve deficit.      Coordination: Coordination normal.   Psychiatric:         Behavior: Behavior normal.         Thought Content: Thought content normal.         Judgment: Judgment normal.

## 2022-08-10 ENCOUNTER — CLINICAL SUPPORT (OUTPATIENT)
Dept: REHABILITATION | Facility: HOSPITAL | Age: 42
End: 2022-08-10
Attending: PHYSICAL MEDICINE & REHABILITATION
Payer: MEDICAID

## 2022-08-10 DIAGNOSIS — M25.612 DECREASED ROM OF LEFT SHOULDER: ICD-10-CM

## 2022-08-10 DIAGNOSIS — M25.512 ACUTE PAIN OF LEFT SHOULDER: Primary | ICD-10-CM

## 2022-08-10 DIAGNOSIS — R29.898 SHOULDER WEAKNESS: ICD-10-CM

## 2022-08-10 LAB — ZINC SERPL-MCNC: 72 UG/DL (ref 60–130)

## 2022-08-10 PROCEDURE — 97110 THERAPEUTIC EXERCISES: CPT | Mod: PO

## 2022-08-11 NOTE — PLAN OF CARE
[facility]  Physical Therapy reassessment       Date: 8/10/2022   Name: Juan Ramirez  Clinic Number: [id number]    Therapy Diagnosis:   Encounter Diagnoses   Name Primary?    Acute pain of left shoulder Yes    Shoulder weakness     Decreased ROM of left shoulder      Physician: Phill Fried, *    Physician Orders: PT Eval and Treat   Medical Diagnosis from Referral: M75.32 (ICD-10-CM) - Calcific tendinitis of left shoulder region     Evaluation Date: 8/10/2022  Authorization Period Expiration: 9/27/22  Plan of Care Expiration: 8/15/22  Progress Note Due: 7/26//22  Visit # / Visits authorized: 9/ 16   FOTO: 46%/ Eval     Precautions: Standard    Time In: 1700  Time Out: 1745  Total Appointment Time (timed & untimed codes): 45 minutes    Subjective   Date of onset: off and on for a couple of years, increasing over the last few months  History of current condition - Juan reports: diffuse left shoulder pain and pain into the left upper trap pain , Pain has been increasing with restricted motion over the past few months, some increase in weakness with pain. She is having difficulty with performing ADLs due to restricted motion     8/10/22: Patient reports: her ROM is doing better and her upper trap area is doing better but she is still having pain in the superior and lateral shoulder. She is due to have a follow up with physical medicine soon.    _       _    Past Medical History:   Diagnosis Date    Abnormal Pap smear of cervix     Abnormal Pap smear of vagina     hx LEEP    Primary hypothyroidism 06/09/2014    TPO +    Thyroid nodule 6/9/2014     Juan Ramirez  has a past surgical history that includes Cervical biopsy w/ loop electrode excision (2006); Ectopic pregnancy surgery (2009); and Tonsillectomy.    Juan has a current medication list which includes the following prescription(s): ivermectin, letrozole, letrozole, metronidazole 1%, tirosint, and tirosint.    Review  of patient's allergies indicates:  No Known Allergies       Prior Therapy: none  Social History:  lives alone  Occupation: computer work  Prior Level of Function: Independent  Current Level of Function: Independent     Pain:  Current 0/10, worst 10/10, best 0/10   Location: left shoulder   Description: Aching, Sharp, Variable and soreness  Aggravating Factors: Flexing, Lifting and reaching benhind back, reaching overhead  Easing Factors: NSAIDs,         Pts goals: decrease pain and be able to do what she used to     Objective   Mental status: oriented x3  Posture/ Alignment: Fair    ROM:     AROM Right Left Comment   Shoulder Flexion: 180 degrees 170* degrees    Shoulder Abduction: 180 degrees 170* degrees    Shoulder ER:        90 degrees         50* degrees    Shoulder Ir:         70 degrees         35* degrees    PROM Right Left Comment   Shoulder Flexion:  180 degrees    Shoulder Abduction:  170 degrees    Shoulder ER, 90°ABD:  60 degrees    Shoulder IR, 90° ABD:  45 degrees    *pain    Strength:      Right Left Comment   Shoulder flexion: 5/5 4-/5    Shoulder Abduction: 5/5 4-/5    Shoulder ER: 5/5 4-/5    Shoulder IR: 5/5 5/5    Lower Trap: 5/5 4+/5    Middle Trap: 5/5 4+/5     5/5 5/5            Special Tests:     - Neers: left positive  - Nieto-Drake: left negative      Palpation:  + TTP Left upper trap, infraspinatus, deltoid    Joint Play:  Mild hypomobility compared to right    Pt/family was provided educational information, including: role of PT, goals for PT, scheduling - pt verbalized understanding. Discussed insurance plan with pt.         CMS Impairment/Limitation/Restriction for FOTO shoulder Survey    Therapist reviewed FOTO scores for Juan Ramirez on 8/10/2022.   FOTO documents entered into [employer] - see Media section.    Limitation Score: not collected         TREATMENT   Treatment Time In: 1705  Treatment Time Out: 1745  Total Treatment time separate from Evaluation: 40 minutes    Juan  received therapeutic exercises to develop strength, endurance, ROM, flexibility, posture and core stabilization for 30 minutes including:  UBE: 2 min forward 2 min backwards  Left shoulder PROM in all directions x 5  Shoulder internal/external rotation : manual resistance x 10  Supine shoulder flexion:1lbs dowel x 15  Supine serratus punches x 15, 3lbs dumbell   sidelying shoulder Abd 2lbs x 15  Pull downs : yellow theraband x 10:  Rows:2 x 10: 25lbs:not performed         Juan received the following direct contact modalities after being cleared for contraindications: Ultrasound:  Juan received ultrasound to manage pain and inflammation at 100 % duty cycle applied to the left shoulder at an intensity of  number entry box 1 W/cm2  for a duration of 10 minutes. Patient tolerated treatment well without adverse effects. Therapist was in attendance throughout intervention.    Home Exercises and Patient Education Provided    Education provided:   - progression of treatment  - importance of ROM    Written Home Exercises Provided: yes.  Exercises were reviewed and Juan was able to demonstrate them prior to the end of the session.  Juan demonstrated good  understanding of the education provided.     See EMR under Patient Instructions for exercises provided 6/28/2022.    Assessment   Pt has made some progress with her ROM but is still limited a lot with external rotation due to pain. Her pain is more localized now to superior lateral shoulder and upper arm area. She has hit a plateau as she hasn't made significant improvements over the last few weeks. I did try some ultrasound today to see how she does . She has follow up with referring provider coming up also to see if there is anything else he would like to try.       Pt prognosis is Good.   Pt will benefit from skilled outpatient Physical Therapy to address the deficits stated above and in the chart below, provide pt/family education, and to maximize pt's level of  independence.     Plan of care discussed with patient: Yes  Pt's spiritual, cultural and educational needs considered and patient is agreeable to the plan of care and goals as stated below:     Anticipated Barriers for therapy: none    Goals:  Short Term Goals: 3-4 weeks   Increase shoulder flex/Abd AROM by 25 degrees or more, met  Increase shoulder external rotation AROM  by 20 degrees or more, progressing, not met  Increase external rotation strength by 1/2 grade , met  Increased shoulder Abd strength by 1/2 grade, met  Decrease pain with ADLs by 2 points or more, met    Long Term Goals: 8-12 weeks   Decrease pain with ADLs by 4 points , progressing, not met  Able to do bra with left arm, progressing, not met  Increase left shoulder ROM equal to right, progressing, not met  Increase left shoulder ROM to 4+/5, progressing, not met      Plan   Plan of care Certification: 8/10/2022 to 9/23/22.    Outpatient Physical Therapy 2 times weekly for 6 weeks to include the following interventions: Manual Therapy, Moist Heat/ Ice, Neuromuscular Re-ed, Patient Education, Self Care, Therapeutic Activities, Therapeutic Exercise and dry needling.     Jose Reece, PT      I CERTIFY THE NEED FOR THESE SERVICES FURNISHED UNDER THIS PLAN OF TREATMENT AND WHILE UNDER MY CARE   Physician's comments:     Physician's Signature: ___________________________________________________

## 2022-08-15 ENCOUNTER — CLINICAL SUPPORT (OUTPATIENT)
Dept: REHABILITATION | Facility: HOSPITAL | Age: 42
End: 2022-08-15
Attending: PHYSICAL MEDICINE & REHABILITATION
Payer: MEDICAID

## 2022-08-15 DIAGNOSIS — M25.512 ACUTE PAIN OF LEFT SHOULDER: Primary | ICD-10-CM

## 2022-08-15 DIAGNOSIS — R29.898 SHOULDER WEAKNESS: ICD-10-CM

## 2022-08-15 DIAGNOSIS — M25.612 DECREASED ROM OF LEFT SHOULDER: ICD-10-CM

## 2022-08-15 PROCEDURE — 97110 THERAPEUTIC EXERCISES: CPT | Mod: PO

## 2022-08-15 NOTE — PROGRESS NOTES
Physical Therapy Daily Treatment Note     Name: Juan Ramirez  [facility] Number: [id number]    Therapy Diagnosis:   Encounter Diagnoses   Name Primary?    Acute pain of left shoulder Yes    Shoulder weakness     Decreased ROM of left shoulder      Physician: Phill Fried, *    Visit Date: 8/15/2022  Physician Orders: PT Eval and Treat   Medical Diagnosis from Referral: M75.32 (ICD-10-CM) - Calcific tendinitis of left shoulder region      Evaluation Date: 6/28/2022  Authorization Period Expiration: 9/27/22  Plan of Care Expiration: 8/15/22  Progress Note Due: 7/26//22  Visit # / Visits authorized: 10/ 20  FOTO: 46%/ Eval    Time In: 1150  Time Out: 1230  Total Billable Time: 40 minutes    Precautions: Standard    Subjective     Pt reports: she had some electric type shooting pain Friday that she hasn't had before. That resolved on Sat and now she is having her baseline pain.     . She was compliant with home exercise program.  Response to previous treatment: some mild soreness  Functional change: no changes     Pain: 1/10  Location: left shoulder      Objective       Juan received therapeutic exercises to develop strength, endurance, ROM, flexibility, posture and core stabilization for 40 minutes including:  UBE: 2 min forward 2 min backwards  Left shoulder PROM in all directions x 6 min  Shoulder internal/external rotation : manual resistance  x 10  Supine shoulder flexion:1lbs dowel x 15  Supine serratus punches x 15, 3lbs dumbell   sidelying shoulder Abd 2lbs x 15  Pull downs : yellow theraband x 10:  Rows:2 x 10: 25lbs:not performed    horizontal Abd x 15: red theraband  Wall slides with foam roller: x 10  dynmic stability: 100/110 degrees x 15 sec x 5  Passive upper trap stretch x 2 min         Juan received the following manual therapy techniques: Joint mobilizations and Manual traction were applied to the: neck for 2 min minutes, including:  Cervical manual traction    Home Exercises Provided and  Patient Education Provided     Education provided:   - progression of treatment    Written Home Exercises Provided: Patient instructed to cont prior HEP.  Exercises were reviewed and Juan was able to demonstrate them prior to the end of the session.  Juan demonstrated good  understanding of the education provided.     See EMR under Patient Instructions for exercises provided 06/28/2022.    Assessment     Juan had some increased pain the day after last visit , but is back to having her baseline pain today. I did include some neck treatment today and her external rotation ROM was a little better and less painful today.     Hima Is progressing well towards her goals.   Pt prognosis is Good.     Pt will continue to benefit from skilled outpatient physical therapy to address the deficits listed in the problem list box on initial evaluation, provide pt/family education and to maximize pt's level of independence in the home and community environment.     Pt's spiritual, cultural and educational needs considered and pt agreeable to plan of care and goals.    Anticipated barriers to physical therapy: none    Goals:  Short Term Goals: 3-4 weeks   Increase shoulder flex/Abd AROM by 25 degrees or more, met  Increase shoulder external rotation AROM  by 20 degrees or more, progressing, not met  Increase external rotation strength by 1/2 grade , met  Increased shoulder Abd strength by 1/2 grade, met  Decrease pain with ADLs by 2 points or more, met     Long Term Goals: 8-12 weeks   Decrease pain with ADLs by 4 points , progressing, not met  Able to do bra with left arm, progressing, not met  Increase left shoulder ROM equal to right, progressing, not met  Increase left shoulder ROM to 4+/5, progressing, not met        Plan   Plan of care Certification: 8/10/2022 to 9/23/22.     Outpatient Physical Therapy 2 times weekly for 6 weeks to include the following interventions: Manual Therapy, Moist Heat/ Ice, Neuromuscular Re-ed,  Patient Education, Self Care, Therapeutic Activities, Therapeutic Exercise and dry needling.       Jose Reece, PT

## 2022-08-17 ENCOUNTER — CLINICAL SUPPORT (OUTPATIENT)
Dept: REHABILITATION | Facility: HOSPITAL | Age: 42
End: 2022-08-17
Attending: PHYSICAL MEDICINE & REHABILITATION
Payer: MEDICAID

## 2022-08-17 DIAGNOSIS — M25.612 DECREASED ROM OF LEFT SHOULDER: ICD-10-CM

## 2022-08-17 DIAGNOSIS — R29.898 SHOULDER WEAKNESS: ICD-10-CM

## 2022-08-17 DIAGNOSIS — M25.512 ACUTE PAIN OF LEFT SHOULDER: Primary | ICD-10-CM

## 2022-08-17 PROCEDURE — 97110 THERAPEUTIC EXERCISES: CPT | Mod: PO

## 2022-08-17 NOTE — PROGRESS NOTES
Physical Therapy Daily Treatment Note     Name: Juan Ramirez  [facility] Number: [id number]    Therapy Diagnosis:   Encounter Diagnoses   Name Primary?    Acute pain of left shoulder Yes    Shoulder weakness     Decreased ROM of left shoulder      Physician: Phill Fried, *    Visit Date: 8/17/2022  Physician Orders: PT Eval and Treat   Medical Diagnosis from Referral: M75.32 (ICD-10-CM) - Calcific tendinitis of left shoulder region      Evaluation Date: 6/28/2022  Authorization Period Expiration: 9/27/22  Plan of Care Expiration: 8/15/22  Progress Note Due: 7/26//22  Visit # / Visits authorized: 11/ 20  FOTO: 46%/ Eval    Time In: 1700  Time Out: 1740  Total Billable Time: 40 minutes    Precautions: Standard    Subjective     Pt reports: she has been feeling about the same lateral shoulder pain and also some weakness with lifting her arm    . She was compliant with home exercise program.  Response to previous treatment: some mild soreness  Functional change: no changes     Pain: 1/10  Location: left shoulder      Objective       Juan received therapeutic exercises to develop strength, endurance, ROM, flexibility, posture and core stabilization for 40 minutes including:  UBE: 2 min forward 2 min backwards  Left shoulder PROM in all directions x 6 min  Shoulder internal/external rotation : manual resistance  x 10  Supine shoulder flexion:1lbs dowel x 15  Supine serratus punches x 15, 3lbs dumbell   sidelying shoulder Abd 2lbs x 15  Pull downs : yellow theraband x 10:  Rows:2 x 10: 25lbs:not performed    horizontal Abd x 15: red theraband  dynmic stability: 100/110 degrees x 15 sec x 5  Passive upper trap stretch x 2 min         Juan received the following manual therapy techniques: Joint mobilizations and Manual traction were applied to the: neck for 0 min minutes, including:  Cervical manual traction    Home Exercises Provided and Patient Education Provided     Education provided:   - progression of  treatment  - take it easy until visit with physical medicine    Written Home Exercises Provided: Patient instructed to cont prior HEP.  Exercises were reviewed and Juan was able to demonstrate them prior to the end of the session.  Juan demonstrated good  understanding of the education provided.     See EMR under Patient Instructions for exercises provided 06/28/2022.    Assessment     Juan continues to have pain and now some increase in weakness with shoulder abduction that may be related to her pain. She is still limited in shoulder external rotation ROM.. I advised her to take it easy for the next 12 days before seeing physical medicine and see how it does. It may feel better with rest for a little while. She will let me know what physical medicine wants to do treatment mcnamara.     Hima Is progressing well towards her goals.   Pt prognosis is Good.     Pt will continue to benefit from skilled outpatient physical therapy to address the deficits listed in the problem list box on initial evaluation, provide pt/family education and to maximize pt's level of independence in the home and community environment.     Pt's spiritual, cultural and educational needs considered and pt agreeable to plan of care and goals.    Anticipated barriers to physical therapy: none    Goals:  Short Term Goals: 3-4 weeks   Increase shoulder flex/Abd AROM by 25 degrees or more, met  Increase shoulder external rotation AROM  by 20 degrees or more, progressing, not met  Increase external rotation strength by 1/2 grade , met  Increased shoulder Abd strength by 1/2 grade, met  Decrease pain with ADLs by 2 points or more, met     Long Term Goals: 8-12 weeks   Decrease pain with ADLs by 4 points , progressing, not met  Able to do bra with left arm, progressing, not met  Increase left shoulder ROM equal to right, progressing, not met  Increase left shoulder ROM to 4+/5, progressing, not met        Plan   Plan of care Certification: 8/10/2022  to 9/23/22.     Outpatient Physical Therapy 2 times weekly for 6 weeks to include the following interventions: Manual Therapy, Moist Heat/ Ice, Neuromuscular Re-ed, Patient Education, Self Care, Therapeutic Activities, Therapeutic Exercise and dry needling.       Jose Reece, PT

## 2022-08-29 ENCOUNTER — OFFICE VISIT (OUTPATIENT)
Dept: PHYSICAL MEDICINE AND REHAB | Facility: CLINIC | Age: 42
End: 2022-08-29
Payer: MEDICAID

## 2022-08-29 VITALS — WEIGHT: 114.19 LBS | HEIGHT: 61 IN | BODY MASS INDEX: 21.56 KG/M2

## 2022-08-29 DIAGNOSIS — M75.32 CALCIFIC TENDINITIS OF LEFT SHOULDER: Primary | ICD-10-CM

## 2022-08-29 PROCEDURE — 99213 OFFICE O/P EST LOW 20 MIN: CPT | Mod: PBBFAC,PN,25 | Performed by: PHYSICAL MEDICINE & REHABILITATION

## 2022-08-29 PROCEDURE — 76881 PR US EXTREMITY OR AXILLA, TISSUE/MUSCLE/JOINT/NERVE; COMPLETE: ICD-10-PCS | Mod: 26,S$PBB,, | Performed by: PHYSICAL MEDICINE & REHABILITATION

## 2022-08-29 PROCEDURE — 76881 US COMPL JOINT R-T W/IMG: CPT | Mod: 26,S$PBB,, | Performed by: PHYSICAL MEDICINE & REHABILITATION

## 2022-08-29 PROCEDURE — 1160F PR REVIEW ALL MEDS BY PRESCRIBER/CLIN PHARMACIST DOCUMENTED: ICD-10-PCS | Mod: CPTII,,, | Performed by: PHYSICAL MEDICINE & REHABILITATION

## 2022-08-29 PROCEDURE — 1159F PR MEDICATION LIST DOCUMENTED IN MEDICAL RECORD: ICD-10-PCS | Mod: CPTII,,, | Performed by: PHYSICAL MEDICINE & REHABILITATION

## 2022-08-29 PROCEDURE — 99213 PR OFFICE/OUTPT VISIT, EST, LEVL III, 20-29 MIN: ICD-10-PCS | Mod: 25,S$PBB,, | Performed by: PHYSICAL MEDICINE & REHABILITATION

## 2022-08-29 PROCEDURE — 99999 PR PBB SHADOW E&M-EST. PATIENT-LVL III: CPT | Mod: PBBFAC,,, | Performed by: PHYSICAL MEDICINE & REHABILITATION

## 2022-08-29 PROCEDURE — 1159F MED LIST DOCD IN RCRD: CPT | Mod: CPTII,,, | Performed by: PHYSICAL MEDICINE & REHABILITATION

## 2022-08-29 PROCEDURE — 99213 OFFICE O/P EST LOW 20 MIN: CPT | Mod: 25,S$PBB,, | Performed by: PHYSICAL MEDICINE & REHABILITATION

## 2022-08-29 PROCEDURE — 1160F RVW MEDS BY RX/DR IN RCRD: CPT | Mod: CPTII,,, | Performed by: PHYSICAL MEDICINE & REHABILITATION

## 2022-08-29 PROCEDURE — 3008F PR BODY MASS INDEX (BMI) DOCUMENTED: ICD-10-PCS | Mod: CPTII,,, | Performed by: PHYSICAL MEDICINE & REHABILITATION

## 2022-08-29 PROCEDURE — 76881 US COMPL JOINT R-T W/IMG: CPT | Mod: PBBFAC,PN | Performed by: PHYSICAL MEDICINE & REHABILITATION

## 2022-08-29 PROCEDURE — 99999 PR PBB SHADOW E&M-EST. PATIENT-LVL III: ICD-10-PCS | Mod: PBBFAC,,, | Performed by: PHYSICAL MEDICINE & REHABILITATION

## 2022-08-29 PROCEDURE — 3008F BODY MASS INDEX DOCD: CPT | Mod: CPTII,,, | Performed by: PHYSICAL MEDICINE & REHABILITATION

## 2022-08-29 RX ORDER — MIDAZOLAM HYDROCHLORIDE 1 MG/ML
2 INJECTION INTRAMUSCULAR; INTRAVENOUS ONCE AS NEEDED
Status: CANCELLED | OUTPATIENT
Start: 2022-08-29 | End: 2034-01-25

## 2022-08-29 RX ORDER — DOXYCYCLINE HYCLATE 100 MG
100 TABLET ORAL DAILY
COMMUNITY
Start: 2022-08-16 | End: 2023-08-07

## 2022-08-29 RX ORDER — LIDOCAINE HYDROCHLORIDE 10 MG/ML
1 INJECTION, SOLUTION EPIDURAL; INFILTRATION; INTRACAUDAL; PERINEURAL ONCE
Status: CANCELLED | OUTPATIENT
Start: 2022-08-29 | End: 2022-08-29

## 2022-08-29 RX ORDER — SODIUM CHLORIDE, SODIUM LACTATE, POTASSIUM CHLORIDE, CALCIUM CHLORIDE 600; 310; 30; 20 MG/100ML; MG/100ML; MG/100ML; MG/100ML
INJECTION, SOLUTION INTRAVENOUS CONTINUOUS
Status: CANCELLED | OUTPATIENT
Start: 2022-08-29
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
## 2022-09-13 ENCOUNTER — PATIENT MESSAGE (OUTPATIENT)
Dept: SURGERY | Facility: HOSPITAL | Age: 42
End: 2022-09-13
Payer: MEDICAID

## 2022-09-13 ENCOUNTER — TELEPHONE (OUTPATIENT)
Dept: PHYSICAL MEDICINE AND REHAB | Facility: CLINIC | Age: 42
End: 2022-09-13
Payer: MEDICAID

## 2022-09-13 NOTE — TELEPHONE ENCOUNTER
----- Message from Ángel Meyers sent at 9/12/2022  3:52 PM CDT -----  Contact: self  Type: Sooner Appointment Request        Caller is requesting a sooner appointment. Caller declined first available appointment listed below. Caller will not accept being placed on the waitlist and is requesting a message be sent to doctor.        Name of Caller: Patient   Best Call Back Number: [phone]  Additional Information: Pt states the paper she received for her procedure says 10/6/2022 but on [place] it says her procedure is 10/7/22. Plz call pt back today to confirm correct date sos he can req correct date off of work. Thanks.

## 2022-09-14 ENCOUNTER — TELEPHONE (OUTPATIENT)
Dept: PHYSICAL MEDICINE AND REHAB | Facility: CLINIC | Age: 42
End: 2022-09-14
Payer: MEDICAID

## 2022-09-14 ENCOUNTER — PATIENT MESSAGE (OUTPATIENT)
Dept: SURGERY | Facility: HOSPITAL | Age: 42
End: 2022-09-14
Payer: MEDICAID

## 2022-09-14 RX ORDER — METHYLPREDNISOLONE 4 MG/1
TABLET ORAL
Qty: 21 TABLET | Refills: 0 | Status: SHIPPED | OUTPATIENT
Start: 2022-09-14 | End: 2022-10-05

## 2022-09-14 RX ORDER — TRAMADOL HYDROCHLORIDE 50 MG/1
50 TABLET ORAL
Qty: 15 TABLET | Refills: 0 | Status: SHIPPED | OUTPATIENT
Start: 2022-09-14 | End: 2023-03-30 | Stop reason: SDUPTHER

## 2022-09-14 NOTE — TELEPHONE ENCOUNTER
----- Message from Tamica Yadav, Patient Care Assistant sent at 9/14/2022  3:37 PM CDT -----  Regarding: advice  Contact: pt  Type: Needs Medical Advice  Who Called:  pt   Symptoms (please be specific):  shoulder pain   How long has patient had these symptoms:  2 days - extreme pain     Best Call Back Number: [phone] (home)     Additional Information: please call pt to advise.

## 2022-09-14 NOTE — TELEPHONE ENCOUNTER
Pt stated her arm is in more pain then usual that started yesterday. Pt stated she is unable to move left arm due to pain. Pt stated Dr. Fried stated to stop physical therapy until after her procedure on 10/7/2022 with Dr. Fried. Pt is wondering if there is anything she can do for pain. I informed pt I would send a message to Dr. Fried but if her pain becomes unbearable or if she loses mobility to go to emergency room to get evaluated. Pt understood

## 2022-09-15 ENCOUNTER — PATIENT MESSAGE (OUTPATIENT)
Dept: SURGERY | Facility: HOSPITAL | Age: 42
End: 2022-09-15
Payer: MEDICAID

## 2022-09-16 ENCOUNTER — PATIENT MESSAGE (OUTPATIENT)
Dept: SURGERY | Facility: HOSPITAL | Age: 42
End: 2022-09-16
Payer: MEDICAID

## 2022-09-16 DIAGNOSIS — M75.32 CALCIFIC TENDINITIS OF LEFT SHOULDER: Primary | ICD-10-CM

## 2022-09-19 ENCOUNTER — PATIENT MESSAGE (OUTPATIENT)
Dept: ENDOCRINOLOGY | Facility: CLINIC | Age: 42
End: 2022-09-19
Payer: MEDICAID

## 2022-09-19 DIAGNOSIS — M75.32 CALCIFIC TENDINITIS OF LEFT SHOULDER: Primary | ICD-10-CM

## 2022-09-19 RX ORDER — LEVOTHYROXINE SODIUM 50 UG/1
CAPSULE ORAL
Qty: 30 CAPSULE | Refills: 0 | Status: SHIPPED | OUTPATIENT
Start: 2022-09-19 | End: 2022-11-30 | Stop reason: SDUPTHER

## 2022-09-19 NOTE — TELEPHONE ENCOUNTER
Asked pt if she would like to schedule MRI. Pt stated she did and appt was made. Pt stated she wanted an appt with Dr. Fried to go over MRI and see what is going on with her shoulder. All questions answered.

## 2022-09-20 ENCOUNTER — HOSPITAL ENCOUNTER (OUTPATIENT)
Dept: RADIOLOGY | Facility: HOSPITAL | Age: 42
Discharge: HOME | End: 2022-09-20
Attending: PHYSICAL MEDICINE & REHABILITATION
Payer: MEDICAID

## 2022-09-20 ENCOUNTER — PATIENT MESSAGE (OUTPATIENT)
Dept: PHYSICAL MEDICINE AND REHAB | Facility: CLINIC | Age: 42
End: 2022-09-20
Payer: MEDICAID

## 2022-09-20 DIAGNOSIS — M75.32 CALCIFIC TENDINITIS OF LEFT SHOULDER: ICD-10-CM

## 2022-09-20 PROCEDURE — 73221 MRI SHOULDER WITHOUT CONTRAST LEFT: ICD-10-PCS | Mod: 26,LT,, | Performed by: RADIOLOGY

## 2022-09-20 PROCEDURE — 73221 MRI JOINT UPR EXTREM W/O DYE: CPT | Mod: 26,LT,, | Performed by: RADIOLOGY

## 2022-09-20 PROCEDURE — 73221 MRI JOINT UPR EXTREM W/O DYE: CPT | Mod: TC,PO,LT

## 2022-09-23 ENCOUNTER — TELEPHONE (OUTPATIENT)
Dept: PHYSICAL MEDICINE AND REHAB | Facility: CLINIC | Age: 42
End: 2022-09-23
Payer: MEDICAID

## 2022-09-23 NOTE — TELEPHONE ENCOUNTER
Informed pt that Dr. Fried will be in procedure at the time of her appt and her appt needs to be moved. Pt stated that she does not need her appt anymore because Dr. Fried already went over her MRI with her. I informed pt that if she has any questions she can always call us or talk to Dr. Fried before her procedure. Pt understood and appt was canceled.

## 2022-09-26 ENCOUNTER — OFFICE VISIT (OUTPATIENT)
Dept: DERMATOLOGY | Facility: CLINIC | Age: 42
End: 2022-09-26
Payer: MEDICAID

## 2022-09-26 VITALS — WEIGHT: 112.63 LBS | HEIGHT: 61 IN | RESPIRATION RATE: 18 BRPM | BODY MASS INDEX: 21.27 KG/M2

## 2022-09-26 DIAGNOSIS — L71.9 ROSACEA: Primary | ICD-10-CM

## 2022-09-26 PROCEDURE — 99999 PR PBB SHADOW E&M-EST. PATIENT-LVL III: ICD-10-PCS | Mod: PBBFAC,,, | Performed by: DERMATOLOGY

## 2022-09-26 PROCEDURE — 99213 OFFICE O/P EST LOW 20 MIN: CPT | Mod: PBBFAC,PO | Performed by: DERMATOLOGY

## 2022-09-26 PROCEDURE — 99213 OFFICE O/P EST LOW 20 MIN: CPT | Mod: S$PBB,,, | Performed by: DERMATOLOGY

## 2022-09-26 PROCEDURE — 1159F MED LIST DOCD IN RCRD: CPT | Mod: CPTII,,, | Performed by: DERMATOLOGY

## 2022-09-26 PROCEDURE — 99999 PR PBB SHADOW E&M-EST. PATIENT-LVL III: CPT | Mod: PBBFAC,,, | Performed by: DERMATOLOGY

## 2022-09-26 PROCEDURE — 99213 PR OFFICE/OUTPT VISIT, EST, LEVL III, 20-29 MIN: ICD-10-PCS | Mod: S$PBB,,, | Performed by: DERMATOLOGY

## 2022-09-26 PROCEDURE — 1159F PR MEDICATION LIST DOCUMENTED IN MEDICAL RECORD: ICD-10-PCS | Mod: CPTII,,, | Performed by: DERMATOLOGY

## 2022-09-26 PROCEDURE — 3008F PR BODY MASS INDEX (BMI) DOCUMENTED: ICD-10-PCS | Mod: CPTII,,, | Performed by: DERMATOLOGY

## 2022-09-26 PROCEDURE — 3008F BODY MASS INDEX DOCD: CPT | Mod: CPTII,,, | Performed by: DERMATOLOGY

## 2022-09-26 RX ORDER — METRONIDAZOLE 7.5 MG/G
GEL TOPICAL DAILY
Qty: 45 G | Refills: 5 | Status: SHIPPED | OUTPATIENT
Start: 2022-09-26 | End: 2023-09-26

## 2022-09-26 NOTE — PROGRESS NOTES
Subjective:       Patient ID:  Juan Ramirez is a 41 y.o. female who presents for   Chief Complaint   Patient presents with    Rash     HPI    Established patient. Previously seen for rosacea, tx'd metronidazole, doxy, sulfacetamide-sulfur wash in past. Recent flare, restarted on metronidazole by pcp with overall control, some residual redness. Notes Benítez's as trigger.     Past Medical History:   Diagnosis Date    Abnormal Pap smear of cervix     Abnormal Pap smear of vagina     hx LEEP    Primary hypothyroidism 06/09/2014    TPO +    Thyroid nodule 6/9/2014     Review of Systems   Constitutional:  Negative for weight loss, weight gain, fatigue and malaise.   Genitourinary:  Negative for frequency.   Skin:  Positive for activity-related sunscreen use. Negative for itching, rash and daily sunscreen use.      Objective:    Physical Exam   Constitutional: She appears well-developed and well-nourished. No distress.   Neurological: She is alert and oriented to person, place, and time. She is not disoriented.   Psychiatric: She has a normal mood and affect.   Skin:   Areas Examined (abnormalities noted in diagram):   Head / Face Inspection Performed            Diagram Legend     Erythematous scaling macule/papule c/w actinic keratosis       Vascular papule c/w angioma      Pigmented verrucoid papule/plaque c/w seborrheic keratosis      Yellow umbilicated papule c/w sebaceous hyperplasia      Irregularly shaped tan macule c/w lentigo     1-2 mm smooth white papules consistent with Milia      Movable subcutaneous cyst with punctum c/w epidermal inclusion cyst      Subcutaneous movable cyst c/w pilar cyst      Firm pink to brown papule c/w dermatofibroma      Pedunculated fleshy papule(s) c/w skin tag(s)      Evenly pigmented macule c/w junctional nevus     Mildly variegated pigmented, slightly irregular-bordered macule c/w mildly atypical nevus      Flesh colored to evenly pigmented papule c/w intradermal nevus        Pink pearly papule/plaque c/w basal cell carcinoma      Erythematous hyperkeratotic cursted plaque c/w SCC      Surgical scar with no sign of skin cancer recurrence      Open and closed comedones      Inflammatory papules and pustules      Verrucoid papule consistent consistent with wart     Erythematous eczematous patches and plaques     Dystrophic onycholytic nail with subungual debris c/w onychomycosis     Umbilicated papule    Erythematous-base heme-crusted tan verrucoid plaque consistent with inflamed seborrheic keratosis     Erythematous Silvery Scaling Plaque c/w Psoriasis     See annotation      Assessment / Plan:        Rosacea  -     metroNIDAZOLE (METROGEL) 0.75 % gel; Apply topically once daily.  Dispense: 45 g; Refill: 5  - Discussed diagnosis, etiology, and treatment options.  - C/w metrogel as above. Refills sent.   - Counseled on potential SE of medication(s) and instructed on use.  - Avoid triggers.          Follow up if symptoms worsen or fail to improve.

## 2022-10-07 ENCOUNTER — HOSPITAL ENCOUNTER (OUTPATIENT)
Facility: HOSPITAL | Age: 42
Discharge: HOME | End: 2022-10-07
Attending: PHYSICAL MEDICINE & REHABILITATION | Admitting: PHYSICAL MEDICINE & REHABILITATION
Payer: MEDICAID

## 2022-10-07 DIAGNOSIS — M75.32 CALCIFIC TENDINITIS OF LEFT SHOULDER: ICD-10-CM

## 2022-10-07 LAB
B-HCG UR QL: NEGATIVE
CTP QC/QA: YES

## 2022-10-07 PROCEDURE — 23405 PR INCISE TENDON/MUSCLE,SHLDR,SINGLE: ICD-10-PCS | Mod: LT,,, | Performed by: PHYSICAL MEDICINE & REHABILITATION

## 2022-10-07 PROCEDURE — 27201423 OPTIME MED/SURG SUP & DEVICES STERILE SUPPLY: Mod: PO | Performed by: PHYSICAL MEDICINE & REHABILITATION

## 2022-10-07 PROCEDURE — 23405 INCISION OF TENDON & MUSCLE: CPT | Mod: LT,,, | Performed by: PHYSICAL MEDICINE & REHABILITATION

## 2022-10-07 PROCEDURE — 36000707: Mod: PO | Performed by: PHYSICAL MEDICINE & REHABILITATION

## 2022-10-07 PROCEDURE — 63600175 PHARM REV CODE 636 W HCPCS: Mod: PO | Performed by: PHYSICAL MEDICINE & REHABILITATION

## 2022-10-07 PROCEDURE — 76942 ECHO GUIDE FOR BIOPSY: CPT | Mod: 26,,, | Performed by: PHYSICAL MEDICINE & REHABILITATION

## 2022-10-07 PROCEDURE — 76942 PR U/S GUIDANCE FOR NEEDLE GUIDANCE: ICD-10-PCS | Mod: 26,,, | Performed by: PHYSICAL MEDICINE & REHABILITATION

## 2022-10-07 PROCEDURE — 36000706: Mod: PO | Performed by: PHYSICAL MEDICINE & REHABILITATION

## 2022-10-07 PROCEDURE — 81025 URINE PREGNANCY TEST: CPT | Mod: PO | Performed by: PHYSICAL MEDICINE & REHABILITATION

## 2022-10-07 PROCEDURE — 71000015 HC POSTOP RECOV 1ST HR: Mod: PO | Performed by: PHYSICAL MEDICINE & REHABILITATION

## 2022-10-07 PROCEDURE — 25000003 PHARM REV CODE 250: Mod: PO | Performed by: PHYSICAL MEDICINE & REHABILITATION

## 2022-10-07 RX ORDER — MIDAZOLAM HYDROCHLORIDE 1 MG/ML
2 INJECTION INTRAMUSCULAR; INTRAVENOUS ONCE AS NEEDED
Status: COMPLETED | OUTPATIENT
Start: 2022-10-07 | End: 2022-10-07

## 2022-10-07 RX ORDER — LIDOCAINE HYDROCHLORIDE 10 MG/ML
INJECTION, SOLUTION EPIDURAL; INFILTRATION; INTRACAUDAL; PERINEURAL
Status: DISCONTINUED | OUTPATIENT
Start: 2022-10-07 | End: 2022-10-07 | Stop reason: HOSPADM

## 2022-10-07 RX ORDER — LIDOCAINE HYDROCHLORIDE 10 MG/ML
1 INJECTION, SOLUTION EPIDURAL; INFILTRATION; INTRACAUDAL; PERINEURAL ONCE
Status: DISCONTINUED | OUTPATIENT
Start: 2022-10-07 | End: 2022-10-07 | Stop reason: HOSPADM

## 2022-10-07 RX ORDER — SODIUM CHLORIDE, SODIUM LACTATE, POTASSIUM CHLORIDE, CALCIUM CHLORIDE 600; 310; 30; 20 MG/100ML; MG/100ML; MG/100ML; MG/100ML
INJECTION, SOLUTION INTRAVENOUS CONTINUOUS
Status: DISCONTINUED | OUTPATIENT
Start: 2022-10-07 | End: 2022-10-07 | Stop reason: HOSPADM

## 2022-10-07 RX ORDER — OXYCODONE HYDROCHLORIDE 5 MG/1
5 TABLET ORAL ONCE
Status: COMPLETED | OUTPATIENT
Start: 2022-10-07 | End: 2022-10-07

## 2022-10-07 RX ADMIN — OXYCODONE 5 MG: 5 TABLET ORAL at 12:10

## 2022-10-07 NOTE — H&P
CHIEF COMPLAINT:        Chief Complaint   Patient presents with    Shoulder Pain       Left      HISTORY OF PRESENT ILLNESS: Juan Ramirez is a 41 y.o. female with PMHx of Hypothyroidism and chronic left shoulder pain who presents to me today for scheduled Tenex procedure to the left rotator cuff.     Review of Systems   Constitutional: Negative for fever.   HENT: Negative for drooling.    Eyes: Negative for discharge.   Respiratory: Negative for choking.    Cardiovascular: Negative for chest pain.   Genitourinary: Negative for flank pain.   Skin: Negative for wound.   Allergic/Immunologic: Negative for immunocompromised state.   Neurological: Negative for tremors and syncope.   Psychiatric/Behavioral: Negative for behavioral problems.      Past Medical History:        Past Medical History:   Diagnosis Date    Abnormal Pap smear of cervix      Abnormal Pap smear of vagina       hx LEEP    Primary hypothyroidism 06/09/2014     TPO +    Thyroid nodule 6/9/2014         Past Surgical History:         Past Surgical History:   Procedure Laterality Date    CERVICAL BIOPSY  W/ LOOP ELECTRODE EXCISION   2006    ECTOPIC PREGNANCY SURGERY   2009     right salpingostomy    TONSILLECTOMY             Family History:         Family History   Problem Relation Age of Onset    Breast cancer Paternal Grandmother 58         inflammatory    Breast cancer Mother 44         unilat, s/p, chemo, unk if TNBC, no germline testing    Other Father           born with 1 kidney    Scoliosis Daughter           tested negative for Charcot-Esther-Tooth    Scoliosis Son      Charcot-Esther-Tooth disease Son           from his father    Skin cancer Maternal Aunt           pt believes, ?type, nose (lived most of life in [place]_    No Known Problems Daughter      No Known Problems Other      No Known Problems Other      No Known Problems Other      Colon cancer Neg Hx      Ovarian cancer Neg Hx      Allergic rhinitis Neg Hx      Allergies Neg Hx       Angioedema Neg Hx      Asthma Neg Hx      Atopy Neg Hx      Eczema Neg Hx      Immunodeficiency Neg Hx      Rhinitis Neg Hx      Urticaria Neg Hx      Cancer Neg Hx           Medications:          Current Outpatient Medications on File Prior to Visit   Medication Sig Dispense Refill    doxycycline (VIBRA-TABS) 100 MG tablet Take 100 mg by mouth once daily.        ivermectin (SOOLANTRA) 1 % Crea Apply 1 ampule topically every evening. 30 g 3    letrozole (FEMARA) 2.5 mg Tab Take 1 tablet (2.5 mg total) by mouth once daily. 5 tablet 0    letrozole (FEMARA) 2.5 mg Tab Take 1 tablet (2.5 mg total) by mouth once daily Take on cycle days 3-7. 5 tablet 0    metronidazole 1% (METROGEL) 1 % Gel Apply topically once daily. 60 g 0    TIROSINT 13 mcg Cap TAKE ONE CAPSULE BY MOUTH EVERY DAY WITH THE 50MCG 30 capsule 6    TIROSINT 50 mcg Cap TAKE ONE CAPSULE BY MOUTH EVERY DAY 30 capsule 1      No current facility-administered medications on file prior to visit.         Allergies: Review of patient's allergies indicates:  No Known Allergies     Social History:   Social History            Socioeconomic History    Marital status: Single   Occupational History    Occupation: billing       Employer: 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    Drug use: Never    Sexual activity: Yes       Partners: Male       Birth control/protection: None   Other Topics Concern    Are you pregnant or think you may be? No    Breast-feeding No   Social History Narrative     Single and has three children.  One daughter and one son has Scoliosis- no other problems.      PHYSICAL EXAMINATION:   General  VSS   Constitutional: Oriented to person, place, and time. No  apparent distress. Pleasant.  HENT:   Head: Normocephalic and atraumatic.   Eyes: Right eye exhibits no discharge. Left eye exhibits no discharge. No scleral icterus.   Pulmonary/Chest: Effort normal. No respiratory distress.   Abdominal: There is no guarding.   Neurological: Alert and oriented to person, place, and time.   Psychiatric: Behavior is normal.   Left Shoulder Exam      Tenderness   The patient is experiencing no tenderness.      Range of Motion   Active abduction:  150   Passive abduction:  150   Left shoulder external rotation: 55.   Forward flexion:  140   Internal rotation 90 degrees:  50      Muscle Strength   Abduction: 3/5   Internal rotation: 4/5   External rotation: 4/5   Biceps: 5/5      Tests   Nieto test: positive  Cross arm: negative  Impingement: positive  Drop arm: negative  Sulcus: absent     Other   Erythema: absent  Scars: absent  Sensation: normal  Pulse: present     INSPECTION: There is no swelling, ecchymoses, erythema or gross deformity of the left shoulder.     Imaging  X-ray of the left shoulder from 9/21: No fracture or subluxation are identified.  There is a relatively dense crescent-shaped 1.8 x 0.6 cm calcification adjacent to the greater tuberosity of the humerus within the acromial humeral space consistent with CPPD and calcific tendinitis.  The acromioclavicular and glenohumeral joints are well maintained and well aligned.      Diagnostic Ultrasound Exam:  FINDINGS: Real time examination was performed. Selected static and dynamic images were obtained, and correlated with prior x-ray and other available imaging.      The left shoulder was examined and findings were as follows: the long head of the biceps tendon was observed to have a normal fibular appearance and was positioned appropriately in the bicipital groove. The biceps tendon did not reveal subluxation on dynamic imaging. There is no visible evidence for coracohumeral impingement. The acromioclavicular joint was  normal. There is no evidence for abnormal translation of the acromioclavicular joint on cross-body adduction.      The rotator cuff was examined in detail. The subscapularis is heterogenous in echotexture consistent with tendinopathy. The supraspinatus was abnormal with a large hyperechoic focus imbedded within the distal fibers consistent with calcific tendinopathy. The infraspinatus was normal in internal echotexture. The teres minor was not assessed. The subacromial/subdeltoid bursa was thickened.      There does not appear to be any effusion within the posterior glenohumeral recess. The spinoglenoid notch is normal, without a suprascapular ganglion cyst.     Data Reviewed: X-ray, ultrasound     Supportive Actions: Independent visualization of images or test specimens     ASSESSMENT:   1. Calcific tendinitis of left shoulder       PLAN:      1. We will proceed today with left rotator cuff Tenex procedure, pt consented.     2. Mallampati score II, ASA 2.     3. RTC in 2 weeks.

## 2022-10-07 NOTE — OP NOTE
Operative Note       Surgery Date: 10/7/2022     Surgeon(s) and Role:     * Phill Fried MD - Primary    Pre-op Diagnosis:  Calcific tendinitis of left shoulder [M75.32]    Post-op Diagnosis: Post-Op Diagnosis Codes:     * Calcific tendinitis of left shoulder [M75.32]    Procedure(s) (LRB):  TENOTOMY, SHOULDER, SUPRASPINATUS TENDON (Left)    Anesthesia: RN IV Sedation    Procedure in Detail/Findings:    Indications:       This is a 41 y.o. female with recalcitrant tendinopathy of the left rotator cuff tendon who presents for elective percutaneous tenotomy. We discussed the risks, benefits and alternatives, and she elected to proceed.      Description of Procedure:   The patient was positioned supine. The left shoulder was prepped in the usual sterile fashion. Using ultrasound, the left supraspinatus tendon was visualized under ultrasound guidance and there was an observed hyperechoic focus within the distal and middle tendon substance, suggestive of calcific tendinopathy. A 27-gauge needle was then inserted in a distal to proximal approach while observing the supraspinatus tendon in its long axis at the insertion upon the greater tuberosity of the humerus. This was done to infuse the area with local anesthetic. We used approximately 9 cc of 1% lidocaine for this purpose. With a #11 blade, a small incision was made allowing the TXB handpiece to be introduced down to the observed pathologic areas. Next, a 19-gauge Angiocath was inserted to further clear a path and formulate a feasible target approach to target the pathologic areas with the TXB handpiece. This was done in an in-plane, distal to proximal approach in long axis to the tendon. The TXB handpiece was then inserted, and with 4 minutes and 4 seconds of energy time, the pathologic calcific areas were ablated. The Tenex handpiece was then removed.      A sterile dressing was applied consisting of Steri-Strips and an roberto wrap. The patient was returned  to the recovery area in stable condition. We will follow up with her in two weeks in the clinic to discuss postoperative rehabilitation. She was given detailed post-procedure instructions regarding light range of motion activity and wound care. She was also issued a left arm sling to be worn when active during the day.    Estimated Blood Loss: Minimal           Condition: Good    Attestation:  I performed the procedure.    The patient was given conscious sedation by a registered nurse with me in attendance. The agents used were fentanyl and Versed. There was continuous monitoring of EKG, blood pressure and pulse oximetry. Total time for conscious sedation was 33 minutes.          Discharge Note    Admit Date: 10/7/2022    Attending Physician: Phill Fried MD     Discharge Physician: Phill Fried MD    Final Diagnosis: Post-Op Diagnosis Codes:     * Calcific tendinitis of left shoulder [M75.32]    Disposition: Home or Self Care, pt discharged and will f/u in clinic in 2 weeks.    Patient Instructions:   Current Discharge Medication List        CONTINUE these medications which have NOT CHANGED    Details   ivermectin (SOOLANTRA) 1 % Crea Apply 1 ampule topically every evening.  Qty: 30 g, Refills: 3    Associated Diagnoses: Erythema; Thyroid nodule; Hypothyroidism due to Hashimoto's thyroiditis; Myalgia; Rosacea      LIDOcaine (LIDODERM) 5 % Place 1 patch onto the skin once daily. Remove & Discard patch within 12 hours or as directed by MD  Qty: 15 patch, Refills: 0      metroNIDAZOLE (METROGEL) 0.75 % gel Apply topically once daily.  Qty: 45 g, Refills: 5    Associated Diagnoses: Rosacea      metronidazole 1% (METROGEL) 1 % Gel Apply topically once daily.  Qty: 60 g, Refills: 0      !! TIROSINT 13 mcg Cap TAKE ONE CAPSULE BY MOUTH EVERY DAY WITH THE 50MCG  Qty: 30 capsule, Refills: 6      !! TIROSINT 50 mcg Cap TAKE 1 CAPSULE BY MOUTH EVERY DAY  Qty: 30 capsule, Refills: 0      doxycycline  (VIBRA-TABS) 100 MG tablet Take 100 mg by mouth once daily.      !! letrozole (FEMARA) 2.5 mg Tab Take 1 tablet (2.5 mg total) by mouth once daily.  Qty: 5 tablet, Refills: 0    Associated Diagnoses: Fertility testing      !! letrozole (FEMARA) 2.5 mg Tab Take 1 tablet (2.5 mg total) by mouth once daily Take on cycle days 3-7.  Qty: 5 tablet, Refills: 0    Associated Diagnoses: Anovulation      traMADoL (ULTRAM) 50 mg tablet Take 1 tablet (50 mg total) by mouth every 24 hours as needed for Pain.  Qty: 15 tablet, Refills: 0    Comments: Quantity prescribed more than 7 day supply? No       !! - Potential duplicate medications found. Please discuss with provider.          Discharge Procedure Orders (must include Diet, Follow-up, Activity)   Discharge Procedure Orders (must include Diet, Follow-up, Activity)   Diet general     Ice to affected area     Call MD for:  temperature >100.4     Call MD for:  persistent nausea and vomiting     Call MD for:  severe uncontrolled pain     Call MD for:  difficulty breathing, headache or visual disturbances     Call MD for:  redness, tenderness, or signs of infection (pain, swelling, redness, odor or green/yellow discharge around incision site)     Call MD for:  hives     Call MD for:  persistent dizziness or light-headedness     Call MD for:  extreme fatigue     Remove dressing in 48 hours     Shower on day dressing removed (No bath)        Discharge Date: 10/7/22

## 2022-10-10 VITALS
BODY MASS INDEX: 21.14 KG/M2 | TEMPERATURE: 97 F | RESPIRATION RATE: 18 BRPM | HEART RATE: 58 BPM | DIASTOLIC BLOOD PRESSURE: 74 MMHG | WEIGHT: 112 LBS | OXYGEN SATURATION: 100 % | SYSTOLIC BLOOD PRESSURE: 119 MMHG | HEIGHT: 61 IN

## 2022-10-31 ENCOUNTER — CLINICAL SUPPORT (OUTPATIENT)
Dept: REHABILITATION | Facility: HOSPITAL | Age: 42
End: 2022-10-31
Attending: PHYSICAL MEDICINE & REHABILITATION
Payer: MEDICAID

## 2022-10-31 DIAGNOSIS — M75.32 CALCIFIC TENDINITIS OF LEFT SHOULDER: ICD-10-CM

## 2022-10-31 PROCEDURE — 97110 THERAPEUTIC EXERCISES: CPT | Mod: PO

## 2022-10-31 NOTE — PLAN OF CARE
[facility]  Physical Therapy reassessment /updated POC      Date: 10/31/2022   Name: Juan Ramirez  Clinic Number: [id number]    Therapy Diagnosis:   Encounter Diagnosis   Name Primary?    Calcific tendinitis of left shoulder      Physician: Phill Fried, *    Physician Orders: PT Eval and Treat   Medical Diagnosis from Referral: M75.32 (ICD-10-CM) - Calcific tendinitis of left shoulder region     Evaluation Date: 10/31/2022  Authorization Period Expiration: 9/16/23  Plan of Care Expiration: 12/26/22  Progress Note Due:11/30/22  Visit # / Visits authorized: 1/1  FOTO: not collected    Precautions: Standard    Time In: 1700  Time Out: 1745  Total Appointment Time (timed & untimed codes): 45 minutes    Subjective   Date of onset: off and on for a couple of years, increasing over the last few months  History of current condition - Juan reports: diffuse left shoulder pain and pain into the left upper trap pain , Pain has been increasing with restricted motion over the past few months, some increase in weakness with pain. She is having difficulty with performing ADLs due to restricted motion     8/10/22: Patient reports: her ROM is doing better and her upper trap area is doing better but she is still having pain in the superior and lateral shoulder. She is due to have a follow up with physical medicine soon.       10/31/22: patient reports : She had tenex procedure on 10/7/22 and has had minimal pain since that time . She did have 2 episodes with lifting stuff where she had some soreness in the upper trap and shoulder area but that improved in about a day. She is having any pain today but still has tightness with hand behind back , external rotation and at end range flexion.    _       _    Past Medical History:   Diagnosis Date    Abnormal Pap smear of cervix     Abnormal Pap smear of vagina     hx LEEP    Primary hypothyroidism 06/09/2014    TPO +    Thyroid nodule 6/9/2014      Juan Ramirez  has a past surgical history that includes Cervical biopsy w/ loop electrode excision (2006); Ectopic pregnancy surgery (2009); Tonsillectomy; and Tenotomy of single tendon of shoulder region (Left, 10/7/2022).    Juan has a current medication list which includes the following prescription(s): doxycycline, ivermectin, letrozole, letrozole, lidocaine, metronidazole, metronidazole 1%, tirosint, tirosint, and tramadol.    Review of patient's allergies indicates:  No Known Allergies       Prior Therapy: none  Social History:  lives alone  Occupation: computer work  Prior Level of Function: Independent  Current Level of Function: Independent     Pain:  Current 0/10, worst 5/10, best 0/10   Location: left shoulder   Description: Aching, Sharp, Variable and soreness  Aggravating Factors: , Lifting and reaching benhind back,  Easing Factors: Tenex        Pts goals: decrease pain and be able to do what she used to     Objective   Mental status: oriented x3  Posture/ Alignment: Fair    ROM:     AROM Right Left Comment   Shoulder Flexion: 180 degrees 180 degrees    Shoulder Abduction: 180 degrees 180degrees    Shoulder ER:        90 degrees         50* degrees    Shoulder Ir:         70 degrees         35* degrees    PROM Right Left Comment   Shoulder Flexion:  180 degrees    Shoulder Abduction:  180 degrees    Shoulder ER, 90°ABD:  60 degrees    Shoulder IR, 90° ABD:  45 degrees    *pain    Strength:      Right Left Comment   Shoulder flexion: 5/5 4-/5    Shoulder Abduction: 5/5 4-/5    Shoulder ER: 5/5 4-/5    Shoulder IR: 5/5 5/5                Palpation:  + TTP Left upper trap, infraspinatus, deltoid    Joint Play:  Mild hypomobility compared to right    Pt/family was provided educational information, including: role of PT, goals for PT, scheduling - pt verbalized understanding. Discussed insurance plan with pt.         CMS Impairment/Limitation/Restriction for FOTO shoulder Survey    Therapist  reviewed FOTO scores for Juan Ramirez on 10/31/2022.   FOTO documents entered into [employer] - see Media section.    Limitation Score: not collected         TREATMENT   Treatment Time In: 1310  Treatment Time Out: 1345  Total Treatment time separate from Evaluation: 35 minutes    Juan received therapeutic exercises to develop strength, endurance, ROM, flexibility, posture and core stabilization for 35 minutes including:  Left shoulder PROM in all directions x 5 min  Shoulder internal/external rotation : manual resistance x 15  Supine shoulder flexion:1lbs dowel x 15  Supine serratus punches x 15, 1lb PVC  sidelying shoulder Abd  x 15  Pull downs : yellow theraband x 10:  Rows:x 15 : red theraband  Supine horizontal Abd : yellow theraband x 10        Home Exercises and Patient Education Provided    Education provided:   - progression of treatment      Written Home Exercises Provided: yes.  Exercises were reviewed and Juan was able to demonstrate them prior to the end of the session.  Juan demonstrated good  understanding of the education provided.     See EMR under Patient Instructions for exercises provided 6/28/2022.    Assessment   Pt is feeling a lot better with only 2 episodes of pain since procedure. She has expected continued decrease of ROM but not as painful with stretching/PROM. She did well and her program will be progressed slowlyy as to not increase risk of causing inflammation.       Pt prognosis is Good.   Pt will benefit from skilled outpatient Physical Therapy to address the deficits stated above and in the chart below, provide pt/family education, and to maximize pt's level of independence.     Plan of care discussed with patient: Yes  Pt's spiritual, cultural and educational needs considered and patient is agreeable to the plan of care and goals as stated below:     Anticipated Barriers for therapy: none    Goals:  Short Term Goals: 3-4 weeks   Increase shoulder external rotation AROM  by 20  degrees or more  Increase external rotation strength by 1/2 grade ,  Increased shoulder Abd strength by 1/2 grade,  Decrease pain with ADLs by 2 points or more    Long Term Goals: 8-12 weeks   Able to do bra with left arm,  Increase left shoulder ROM equal to right  Increase left shoulder ROM to 4+/5      Plan   Plan of care Certification: 10/31/2022 to 12/26//22.    Outpatient Physical Therapy 2 times weekly for 8 weeks to include the following interventions: Manual Therapy, Moist Heat/ Ice, Neuromuscular Re-ed, Patient Education, Self Care, Therapeutic Activities, Therapeutic Exercise and dry needling .     Jose Reece, PT      I CERTIFY THE NEED FOR THESE SERVICES FURNISHED UNDER THIS PLAN OF TREATMENT AND WHILE UNDER MY CARE   Physician's comments:     Physician's Signature: ___________________________________________________

## 2022-11-07 NOTE — PROGRESS NOTES
[facility]   Physical Therapy Treatment Note     Name: Juan Ramirez  [facility] Number: [id number]    Therapy Diagnosis:   Encounter Diagnoses   Name Primary?    Chronic left shoulder pain Yes    Shoulder weakness     Decreased ROM of left shoulder      Physician: Phill Fried, *    Visit Date: 11/8/2022  Physician Orders: PT Eval and Treat   Medical Diagnosis from Referral: M75.32 (ICD-10-CM) - Calcific tendinitis of left shoulder region      Evaluation Date: 10/31/2022  Authorization Period Expiration: 9/16/23  Plan of Care Expiration: 12/26/22  Progress Note Due:11/30/22  Visit # / Visits authorized: 1/16  FOTO: not collected    PTA Visit #: 1/5     Precautions: Standard     Time In: 1330  Time Out: 1405  Total Billable Time: 25 minutes    SUBJECTIVE     Pt reports: her (L) shoulder is doing really well. Patient states she feels like she may have strained the (R) shoulder while playing badminton with her daughter. She was compliant with home exercise program.  Response to previous treatment: no adverse effects   Functional change: too soon to determine     Pain: 0/10  Location: left shoulder      OBJECTIVE     Objective Measures updated at progress report unless specified.     Treatment     Juan received the treatments listed below:      therapeutic exercises to develop strength, endurance, ROM, flexibility, posture and core stabilization for 30 minutes including:  Left shoulder PROM in all directions x 10 min (focus on ER)  Shoulder internal/external rotation : manual resistance x 15  Supine shoulder flexion:1lbs dowel x 20  Supine serratus punches x 15, 1lb PVC  Supine ER stretch with dowel x 15, 5 sec hold   Supine horizontal Abd : yellow theraband x 10  Sidelying shoulder Abd  x 15  Sidelying ER 2x10  Pull downs x 20, yellow theraband  Rows x 20, red theraband     manual therapy techniques: Joint mobilizations were applied to the: L shoulder for 05 minutes,  including:  Glenohumeral joint mobilizations: A/P, inferior grade II      Patient Education and Home Exercises     Home Exercises Provided and Patient Education Provided     Education provided:   - HEP compliance     Written Home Exercises Provided: Patient instructed to cont prior HEP. Exercises were reviewed and Juan was able to demonstrate them prior to the end of the session.  Juan demonstrated good  understanding of the education provided. See EMR under Patient Instructions for exercises provided during therapy sessions    ASSESSMENT     Juan demonstrates ROM limitation in ER with firm end feel noted. End feel and ROM improve with repetition without provoking pain. Good tolerance to light rotator cuff and piyush-scapular loading.     Juan Is progressing well towards her goals.   Pt prognosis is Good.     Pt will continue to benefit from skilled outpatient physical therapy to address the deficits listed in the problem list box on initial evaluation, provide pt/family education and to maximize pt's level of independence in the home and community environment.     Pt's spiritual, cultural and educational needs considered and pt agreeable to plan of care and goals.     Anticipated barriers to physical therapy: none    Goals:   Short Term Goals: 3-4 weeks   Increase shoulder external rotation AROM  by 20 degrees or more  Increase external rotation strength by 1/2 grade ,  Increased shoulder Abd strength by 1/2 grade,  Decrease pain with ADLs by 2 points or more     Long Term Goals: 8-12 weeks   Able to do bra with left arm,  Increase left shoulder ROM equal to right  Increase left shoulder ROM to 4+/5    PLAN     Continue current POC with emphasis on improving ROM and restoring strength.     Emely Antunez, PTA

## 2022-11-08 ENCOUNTER — CLINICAL SUPPORT (OUTPATIENT)
Dept: REHABILITATION | Facility: HOSPITAL | Age: 42
End: 2022-11-08
Attending: PHYSICAL MEDICINE & REHABILITATION
Payer: MEDICAID

## 2022-11-08 DIAGNOSIS — M25.512 CHRONIC LEFT SHOULDER PAIN: Primary | ICD-10-CM

## 2022-11-08 DIAGNOSIS — M25.612 DECREASED ROM OF LEFT SHOULDER: ICD-10-CM

## 2022-11-08 DIAGNOSIS — G89.29 CHRONIC LEFT SHOULDER PAIN: Primary | ICD-10-CM

## 2022-11-08 DIAGNOSIS — R29.898 SHOULDER WEAKNESS: ICD-10-CM

## 2022-11-08 PROCEDURE — 97110 THERAPEUTIC EXERCISES: CPT | Mod: PO,CQ

## 2022-11-09 NOTE — TELEPHONE ENCOUNTER
----- Message from Suki Brown sent at 2/5/2018  2:41 PM CST -----  Contact: pt  OUT OF MED      CALLED LAST WEEK FOR REFILL     ----    Contact: Self [phone]  PT states she is taking levothyroxine (SYNTHROID) 50 MCG tablet and she thinks it is causing heart palpitations. She wants to know if the dosage can be decreased.     .Refill request.  levothyroxine (SYNTHROID) 25 MCG tablet  ..               levothyroxine (SYNTHROID) 25 MCG tablet     [facility] [address] [place] [address] [email or web address]    Deleted Julio     Thanks      [Time Spent: ___ minutes] : I have spent [unfilled] minutes of time on the encounter. [>50% of the face to face encounter time was spent on counseling and/or coordination of care for ___] : Greater than 50% of the face to face encounter time was spent on counseling and/or coordination of care for [unfilled]

## 2022-11-11 ENCOUNTER — TELEPHONE (OUTPATIENT)
Dept: PHYSICAL MEDICINE AND REHAB | Facility: CLINIC | Age: 42
End: 2022-11-11
Payer: MEDICAID

## 2022-11-11 ENCOUNTER — PATIENT MESSAGE (OUTPATIENT)
Dept: REHABILITATION | Facility: HOSPITAL | Age: 42
End: 2022-11-11
Payer: MEDICAID

## 2022-11-11 DIAGNOSIS — M75.32 CALCIFIC TENDINITIS OF LEFT SHOULDER: Primary | ICD-10-CM

## 2022-11-11 DIAGNOSIS — M25.511 ACUTE PAIN OF RIGHT SHOULDER: ICD-10-CM

## 2022-11-11 DIAGNOSIS — M25.521 PAIN IN RIGHT ELBOW: Primary | ICD-10-CM

## 2022-11-11 NOTE — TELEPHONE ENCOUNTER
----- Message from Karly Olson sent at 11/11/2022  2:05 PM CST -----  Contact: Self  Type:  Needs Medical Advice    Who Called: patient    Would the patient rather a call back or a response via [facility]? call  Best Call Back Number: [phone]    Additional Information: pt ref'd to ortho but they aren't accepting new pt's. Pt req call back from Dr Pimentel's staff to see about being new pt or ref'd to somebody that can do same types of procedures as previous

## 2022-11-14 ENCOUNTER — HOSPITAL ENCOUNTER (OUTPATIENT)
Dept: RADIOLOGY | Facility: HOSPITAL | Age: 42
Discharge: HOME | End: 2022-11-14
Attending: PHYSICAL MEDICINE & REHABILITATION
Payer: MEDICAID

## 2022-11-14 ENCOUNTER — OFFICE VISIT (OUTPATIENT)
Dept: PHYSICAL MEDICINE AND REHAB | Facility: CLINIC | Age: 42
End: 2022-11-14
Payer: MEDICAID

## 2022-11-14 VITALS — BODY MASS INDEX: 21.14 KG/M2 | HEIGHT: 61 IN | WEIGHT: 112 LBS

## 2022-11-14 DIAGNOSIS — M25.511 ACUTE PAIN OF RIGHT SHOULDER: ICD-10-CM

## 2022-11-14 DIAGNOSIS — G89.29 CHRONIC RIGHT SHOULDER PAIN: Primary | ICD-10-CM

## 2022-11-14 DIAGNOSIS — M25.521 PAIN IN RIGHT ELBOW: ICD-10-CM

## 2022-11-14 DIAGNOSIS — M25.511 CHRONIC RIGHT SHOULDER PAIN: Primary | ICD-10-CM

## 2022-11-14 PROCEDURE — 99999 PR PBB SHADOW E&M-EST. PATIENT-LVL III: CPT | Mod: PBBFAC,,, | Performed by: PHYSICAL MEDICINE & REHABILITATION

## 2022-11-14 PROCEDURE — 99214 OFFICE O/P EST MOD 30 MIN: CPT | Mod: 24,S$PBB,, | Performed by: PHYSICAL MEDICINE & REHABILITATION

## 2022-11-14 PROCEDURE — 1159F PR MEDICATION LIST DOCUMENTED IN MEDICAL RECORD: ICD-10-PCS | Mod: CPTII,,, | Performed by: PHYSICAL MEDICINE & REHABILITATION

## 2022-11-14 PROCEDURE — 1160F PR REVIEW ALL MEDS BY PRESCRIBER/CLIN PHARMACIST DOCUMENTED: ICD-10-PCS | Mod: CPTII,,, | Performed by: PHYSICAL MEDICINE & REHABILITATION

## 2022-11-14 PROCEDURE — 1160F RVW MEDS BY RX/DR IN RCRD: CPT | Mod: CPTII,,, | Performed by: PHYSICAL MEDICINE & REHABILITATION

## 2022-11-14 PROCEDURE — 73030 X-RAY EXAM OF SHOULDER: CPT | Mod: TC,PO,RT

## 2022-11-14 PROCEDURE — 73030 XR SHOULDER TRAUMA 3 VIEW RIGHT: ICD-10-PCS | Mod: 26,RT,, | Performed by: RADIOLOGY

## 2022-11-14 PROCEDURE — 1159F MED LIST DOCD IN RCRD: CPT | Mod: CPTII,,, | Performed by: PHYSICAL MEDICINE & REHABILITATION

## 2022-11-14 PROCEDURE — 99999 PR PBB SHADOW E&M-EST. PATIENT-LVL III: ICD-10-PCS | Mod: PBBFAC,,, | Performed by: PHYSICAL MEDICINE & REHABILITATION

## 2022-11-14 PROCEDURE — 73030 X-RAY EXAM OF SHOULDER: CPT | Mod: 26,RT,, | Performed by: RADIOLOGY

## 2022-11-14 PROCEDURE — 3008F PR BODY MASS INDEX (BMI) DOCUMENTED: ICD-10-PCS | Mod: CPTII,,, | Performed by: PHYSICAL MEDICINE & REHABILITATION

## 2022-11-14 PROCEDURE — 3008F BODY MASS INDEX DOCD: CPT | Mod: CPTII,,, | Performed by: PHYSICAL MEDICINE & REHABILITATION

## 2022-11-14 PROCEDURE — 99213 OFFICE O/P EST LOW 20 MIN: CPT | Mod: PBBFAC,PN | Performed by: PHYSICAL MEDICINE & REHABILITATION

## 2022-11-14 PROCEDURE — 99214 PR OFFICE/OUTPT VISIT, EST, LEVL IV, 30-39 MIN: ICD-10-PCS | Mod: 24,S$PBB,, | Performed by: PHYSICAL MEDICINE & REHABILITATION

## 2022-11-14 RX ORDER — COVID-19 MOLECULAR TEST ASSAY
KIT MISCELLANEOUS
COMMUNITY
Start: 2022-06-05 | End: 2023-08-07

## 2022-11-14 RX ORDER — TRETINOIN 0.025 %
CREAM (GRAM) TOPICAL
COMMUNITY
Start: 2022-10-28

## 2022-11-14 RX ORDER — MELOXICAM 15 MG/1
15 TABLET ORAL DAILY
COMMUNITY
Start: 2022-06-25 | End: 2023-02-14

## 2022-11-14 NOTE — PROGRESS NOTES
[facility]    CHIEF COMPLAINT:   Chief Complaint   Patient presents with    Shoulder Pain     Right       HISTORY OF PRESENT ILLNESS: Juan Ramirez is a 41 y.o. female who presents to me for evaluation and management of right shoulder pain.    Previous patient of Dr. Fried, for which he was managing her chronic left shoulder pain with known rotator cuff calcific tendinitis.     Today reports, 1 month of right shoulder pain after playing badminton denies any significant trauma but noted next day soreness.  Few days later she endorses walking her dog and being pulled by the leash with increased pain to her right shoulder denies popping sensation.  Denies weakness of the right upper extremity.  Denies numbness or tingling to the right upper extremity.    Medications:  None, previously has trialed oral steroids as well as meloxicam for left shoulder  Injections:  None  Therapies:  No formal for right shoulder, is currently in formal physical therapy 2 times a week for her left shoulder.  Bracing:  None  DME:  None    Review of Systems   Constitutional: Negative for fever.   HENT: Negative for drooling.    Eyes: Negative for discharge.   Respiratory: Negative for choking.    Cardiovascular: Negative for chest pain.   Genitourinary: Negative for flank pain.   Skin: Negative for wound.   Allergic/Immunologic: Negative for immunocompromised state.   Neurological: Negative for tremors and syncope.   Psychiatric/Behavioral: Negative for behavioral problems.     Past Medical History:   Past Medical History:   Diagnosis Date    Abnormal Pap smear of cervix     Abnormal Pap smear of vagina     hx LEEP    Primary hypothyroidism 06/09/2014    TPO +    Thyroid nodule 6/9/2014       Past Surgical History:   Past Surgical History:   Procedure Laterality Date    CERVICAL BIOPSY  W/ LOOP ELECTRODE EXCISION  2006    ECTOPIC PREGNANCY SURGERY  2009    right salpingostomy    TENOTOMY OF  SINGLE TENDON OF SHOULDER REGION Left 10/7/2022    Procedure: TENOTOMY, SHOULDER REGION, 1 TENDON;  Surgeon: Phill Fried MD;  Location: [facility];  Service: Orthopedics;  Laterality: Left;    TONSILLECTOMY         Family History:   Family History   Problem Relation Age of Onset    Breast cancer Paternal Grandmother 58        inflammatory    Breast cancer Mother 44        unilat, s/p, chemo, unk if TNBC, no germline testing    Other Father         born with 1 kidney    Scoliosis Daughter         tested negative for Charcot-Esther-Tooth    Scoliosis Son     Charcot-Esther-Tooth disease Son         from his father    Skin cancer Maternal Aunt         pt believes, ?type, nose (lived most of life in [place]_    No Known Problems Daughter     No Known Problems Other     No Known Problems Other     No Known Problems Other     Colon cancer Neg Hx     Ovarian cancer Neg Hx     Allergic rhinitis Neg Hx     Allergies Neg Hx     Angioedema Neg Hx     Asthma Neg Hx     Atopy Neg Hx     Eczema Neg Hx     Immunodeficiency Neg Hx     Rhinitis Neg Hx     Urticaria Neg Hx     Cancer Neg Hx        Medications:   Current Outpatient Medications on File Prior to Visit   Medication Sig Dispense Refill    doxycycline (VIBRA-TABS) 100 MG tablet Take 100 mg by mouth once daily.      ID NOW COVID-19 TEST KIT Kit TEST AS DIRECTED TODAY      ivermectin (SOOLANTRA) 1 % Crea Apply 1 ampule topically every evening. 30 g 3    letrozole (FEMARA) 2.5 mg Tab Take 1 tablet (2.5 mg total) by mouth once daily. 5 tablet 0    letrozole (FEMARA) 2.5 mg Tab Take 1 tablet (2.5 mg total) by mouth once daily Take on cycle days 3-7. 5 tablet 0    LIDOcaine (LIDODERM) 5 % Place 1 patch onto the skin once daily. Remove & Discard patch within 12 hours or as directed by MD 15 patch 0    meloxicam (MOBIC) 15 MG tablet Take 15 mg by mouth once daily.      metroNIDAZOLE (METROGEL) 0.75 % gel Apply topically once daily. 45 g 5    metronidazole 1% (METROGEL) 1 % Gel  "Apply topically once daily. 60 g 0    RETIN-A 0.025 % cream       TIROSINT 13 mcg Cap TAKE 1 CAPSULE BY MOUTH EVERY DAY WITH THE 50 MCG 30 capsule 6    TIROSINT 50 mcg Cap TAKE 1 CAPSULE BY MOUTH EVERY DAY 30 capsule 0    traMADoL (ULTRAM) 50 mg tablet Take 1 tablet (50 mg total) by mouth every 24 hours as needed for Pain. 15 tablet 0     No current facility-administered medications on file prior to visit.       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    Drug use: Never    Sexual activity: Yes     Partners: Male     Birth control/protection: None   Other Topics Concern    Are you pregnant or think you may be? No    Breast-feeding No   Social History Narrative    Single and has three children.  One daughter and one son has Scoliosis- no other problems.       PHYSICAL EXAMINATION:   Vitals:    22 1515   Weight: 50.8 kg (112 lb)   Height: 5' 1" (1.549 m)     Constitutional: No apparent distress. Pleasant.  HENT: Trachea midline.  Head: Normocephalic and atraumatic. Head and neck revealed no asymmetry, no masses to be present.  Eyes: Right eye exhibits no discharge. Left eye exhibits no discharge. No scleral icterus.   CV: Well perfused.   Pulmonary/Chest: Effort normal. No respiratory distress.   Abdominal: There is no guarding.   Neurological: Awake, alert and cooperative.  SKIN: Intact no apparent lesions, cut, ulcers or abrasions  EXT:  No cyanosis, clubbing, or edema.  SENSORY: Intact to light touch in the bilateral " upper extemities.  MUSCKULOSKELETAL:   Muscle Strength:(0-5)                              Right     Left  Shoulder abduction:               5  5  Shoulder external rotation:     5  5  Elbow flexion:              5  5  Elbow extension:          5  5  Wrist extension:             5  5  Wrist flexion:             5  5  Finger extension:        5  5  Finger flexion:   5  5  Finger abduction:  5  5    Reflexes: 0-4+/4   Right     Left  Biceps (C5):  2+  2+  Brachioradialis (C6): 2+  2+  Triceps (C7):  2+  2+    INSPECTION:         Right     Left   Localized/Generalized swelling:   -  -  Muscle contours normal and symmetrical: +  +  Ecchymoses:      -  -  Erythema:      -  -  Gross deformity:    -  -  Subluxation/Dislocation:   -  -    SHOULDER DEFORMITY:            Right      Left  Normal:          +  +  Other:    RANGE OF MOTION:           Right      Left  Flexion:  Full  Full   Extension:  Full  Full  Internal rotation:  Full  Full  External rotation:  Full  Full  Abduction:  Full  Full  Adduction:  Full  Full  Other:     TENDERNESS:                 Right      Left  AC joint:        -  -  Subacromial bursa:  +  -  Biceps tendon:  -  -  Supraspinatus tendon: -  -  Other:     SOFT TISSUE PALPATION:      Right  Left   Effusion:  -  -  Other:        SPECIAL TESTS:         Right     Left  Drop arm:  -  -  Painful arc:  -  -  Nieto:  +  -  Neer:   +  -  Yergason:  -  -  Speed:   -  -  Cross-arm:  +  -  Pulaski's:  -  -  Empty can:  -  -  Spurling's:  -  -  Wrist clonus:  -  -  Martinez's:  -  -    IMAGING:  XR right shoulder from 11/14/2022 without noted bony pathology.     Data Reviewed: X-ray  Supportive Actions: Independent visualization of images or test specimens.    ASSESSMENT:   1. Chronic right shoulder pain        PLAN:   1. Time was spent reviewing the above diagnosis in depth with Juan seaman, including acute management and rehabilitation.     2. Physical examination consistent with right intra-articular shoulder  pain.  We discussed management options with oral medications versus topical medications versus intra-articular injections with steroids versus platelet rich plasma.  At this time patient does not want to proceed with any injections, but in the future may consider platelet rich plasma injections.    3. She states that she has a previous prescription for meloxicam for which I have asked her to take once a day for at least the next week for pain/inflammation management.  I also discussed use topical diclofenac gel 4 times a day over the right shoulder painful area for which she agrees with.    4. She plans to discuss with her physical therapist to consider addition of treatment to her right shoulder along with her left.  She plans to reach out to us if this is an issue.    RTC in 1 month

## 2022-11-14 NOTE — PROGRESS NOTES
[facility]   Physical Therapy Treatment Note     Name: Juan Ramirez  [facility] Number: [id number]    Therapy Diagnosis:   Encounter Diagnoses   Name Primary?    Chronic left shoulder pain Yes    Shoulder weakness     Decreased ROM of left shoulder          Physician: Phill Fried, *    Visit Date: 11/15/2022  Physician Orders: PT Eval and Treat   Medical Diagnosis from Referral: M75.32 (ICD-10-CM) - Calcific tendinitis of left shoulder region      Evaluation Date: 10/31/2022  Authorization Period Expiration: 9/16/23  Plan of Care Expiration: 12/26/22  Progress Note Due:11/30/22  Visit # / Visits authorized: 2/16  FOTO: not collected    PTA Visit #: 1/5     Precautions: Standard     Time In: 1430  Time Out: 1515  Total Billable Time: 45 minutes    SUBJECTIVE     Pt reports: her left shoulder is still feeling pretty good with only some mild intermittent pain but not often but is still tight with external rotation. She has been having increased right shoulder pain not as bad as the left was but can notice it with ADLs and normal movement. She was seen by physical medicine and they want her to work on exercises and NSAID treatment and see how she does.        She was compliant with home exercise program.  Response to previous treatment: no adverse effects   Functional change: too soon to determine     Pain: 4/10  Location:  right  shoulder    Left shoulder 0/10  OBJECTIVE     Objective Measures updated at progress report unless specified.     Treatment     Juan received the treatments listed below:      therapeutic exercises to develop strength, endurance, ROM, flexibility, posture and core stabilization for 40 minutes including:  Left shoulder PROM in all directions x 10 min (focus on ER)  Shoulder internal red theraband: bilateral x 15  Shoulder external rotation theraband : bilateral x 15  Supine shoulder flexion:3lbs shoulder flexion x 15  Supine serratus punches x 15, 3lb  PVC  Supine ER stretch with dowel x 15, 5 sec hold   Supine horizontal Abd : Red theraband x 10  Sidelying shoulder Abd  x 15  Sidelying ER 2x10: not performed  Pull downs x 20, yellow theraband  Rows x 20, red theraband     manual therapy techniques: Joint mobilizations were applied to the: L shoulder for 0 minutes, including:  Glenohumeral joint mobilizations: A/P, inferior grade II      Patient Education and Home Exercises     Home Exercises Provided and Patient Education Provided     Education provided:   - HEP compliance     Written Home Exercises Provided: Patient instructed to cont prior HEP. Exercises were reviewed and Juan was able to demonstrate them prior to the end of the session.  Juan demonstrated good  understanding of the education provided. See EMR under Patient Instructions for exercises provided during therapy sessions    ASSESSMENT     Juan is doing good with her left shoulder except for external rotation tightness on the left. I will address both shoulders per recent request by physical medicine during her treatment session to work on strenghtening and ROM still on left.      Juan Is progressing well towards her goals.   Pt prognosis is Good.     Pt will continue to benefit from skilled outpatient physical therapy to address the deficits listed in the problem list box on initial evaluation, provide pt/family education and to maximize pt's level of independence in the home and community environment.     Pt's spiritual, cultural and educational needs considered and pt agreeable to plan of care and goals.     Anticipated barriers to physical therapy: none    Goals:   Short Term Goals: 3-4 weeks   Increase shoulder external rotation AROM  by 20 degrees or more, progressing, not met  Increase external rotation strength by 1/2 grade ,progressing, not met  Increased shoulder Abd strength by 1/2 grade,progressing, not met  Decrease pain with ADLs by 2 points or more, progressing, not met     Long  Term Goals: 8-12 weeks   Able to do bra with left arm,progressing, not met  Increase left shoulder ROM equal to right, progressing, not met  Increase left shoulder ROM to 4+/5, progressing, not met    PLAN     Continue current POC with emphasis on improving ROM and restoring strength.     Jose Reece, PT

## 2022-11-15 ENCOUNTER — CLINICAL SUPPORT (OUTPATIENT)
Dept: REHABILITATION | Facility: HOSPITAL | Age: 42
End: 2022-11-15
Attending: PHYSICAL MEDICINE & REHABILITATION
Payer: MEDICAID

## 2022-11-15 DIAGNOSIS — M25.612 DECREASED ROM OF LEFT SHOULDER: ICD-10-CM

## 2022-11-15 DIAGNOSIS — R29.898 SHOULDER WEAKNESS: ICD-10-CM

## 2022-11-15 DIAGNOSIS — G89.29 CHRONIC LEFT SHOULDER PAIN: Primary | ICD-10-CM

## 2022-11-15 DIAGNOSIS — M25.512 CHRONIC LEFT SHOULDER PAIN: Primary | ICD-10-CM

## 2022-11-15 PROCEDURE — 97110 THERAPEUTIC EXERCISES: CPT | Mod: PO

## 2022-11-28 NOTE — PROGRESS NOTES
[facility]   Physical Therapy Treatment Note     Name: Juan Ramirez  [facility] Number: [id number]    Therapy Diagnosis:   Encounter Diagnoses   Name Primary?    Chronic left shoulder pain Yes    Shoulder weakness     Decreased ROM of left shoulder            Physician: Phill Fried, *    Visit Date: 11/29/2022  Physician Orders: PT Eval and Treat   Medical Diagnosis from Referral: M75.32 (ICD-10-CM) - Calcific tendinitis of left shoulder region      Evaluation Date: 10/31/2022  Authorization Period Expiration: 9/16/23  Plan of Care Expiration: 12/26/22  Progress Note Due:11/30/22  Visit # / Visits authorized: 3/16  FOTO: not collected    PTA Visit #: 1/5     Precautions: Standard     Time In: 1020  Time Out: 1100  Total Billable Time: 40 minutes    SUBJECTIVE     Pt reports:  she has been feeling pretty good w/o pain in left and only intermittent pain in right        She was compliant with home exercise program.  Response to previous treatment: no adverse effects   Functional change: too soon to determine     Pain: 0/10  Location:  right  shoulder    Left shoulder 0/10  OBJECTIVE     Objective Measures updated at progress report unless specified.     Treatment     Juan received the treatments listed below:      therapeutic exercises to develop strength, endurance, ROM, flexibility, posture and core stabilization for 40 minutes including:  Left shoulder PROM in all directions x 10 min (focus on ER)  Shoulder internal red theraband: bilateral x 15  Shoulder external rotation red theraband : bilateral x 15  Supine shoulder flexion:3lbs shoulder flexion x 15  Supine serratus punches x 15, 3lb PVC  Supine ER stretch with dowel x 15, 5 sec hold : not performed  Supine horizontal Abd : Red theraband x 10  Sidelying shoulder Abd  x 15, 2lbs   Sidelying ER 2x10: not performed  Pull downs x 10, green theraband  Rows x 15, blue theraband   standing shoulder flexion: red therband x 15  Yellow  theraband : sherie: x 10  manual therapy techniques: Joint mobilizations were applied to the: L shoulder for 0 minutes, including:  Glenohumeral joint mobilizations: A/P, inferior grade II      Patient Education and Home Exercises     Home Exercises Provided and Patient Education Provided     Education provided:   - HEP compliance     Written Home Exercises Provided: Patient instructed to cont prior HEP. Exercises were reviewed and Juan was able to demonstrate them prior to the end of the session.  Juan demonstrated good  understanding of the education provided. See EMR under Patient Instructions for exercises provided during therapy sessions    ASSESSMENT     Juan has been doing good with decreasing pain with ADLs. She is still having left shoulder external rotation tightness but it is improving. Her right shoulder is feeling better with less pain. She has done well with her in clinic exercises with some mild pain in right shoulder with sherie theraband exercises     Juan Is progressing well towards her goals.   Pt prognosis is Good.     Pt will continue to benefit from skilled outpatient physical therapy to address the deficits listed in the problem list box on initial evaluation, provide pt/family education and to maximize pt's level of independence in the home and community environment.     Pt's spiritual, cultural and educational needs considered and pt agreeable to plan of care and goals.     Anticipated barriers to physical therapy: none    Goals:   Short Term Goals: 3-4 weeks   Increase shoulder external rotation AROM  by 20 degrees or more, progressing, not met  Increase external rotation strength by 1/2 grade ,progressing, not met  Increased shoulder Abd strength by 1/2 grade,progressing, not met  Decrease pain with ADLs by 2 points or more, progressing, not met     Long Term Goals: 8-12 weeks   Able to do bra with left arm,progressing, not met  Increase left shoulder ROM equal to right,  progressing, not met  Increase left shoulder ROM to 4+/5, progressing, not met    PLAN     Continue current POC with emphasis on improving ROM and restoring strength.     Jose Reece, PT

## 2022-11-29 ENCOUNTER — CLINICAL SUPPORT (OUTPATIENT)
Dept: REHABILITATION | Facility: HOSPITAL | Age: 42
End: 2022-11-29
Attending: PHYSICAL MEDICINE & REHABILITATION
Payer: MEDICAID

## 2022-11-29 DIAGNOSIS — R29.898 SHOULDER WEAKNESS: ICD-10-CM

## 2022-11-29 DIAGNOSIS — G89.29 CHRONIC LEFT SHOULDER PAIN: Primary | ICD-10-CM

## 2022-11-29 DIAGNOSIS — M25.612 DECREASED ROM OF LEFT SHOULDER: ICD-10-CM

## 2022-11-29 DIAGNOSIS — M25.512 CHRONIC LEFT SHOULDER PAIN: Primary | ICD-10-CM

## 2022-11-29 PROCEDURE — 97110 THERAPEUTIC EXERCISES: CPT | Mod: PO

## 2022-11-30 ENCOUNTER — PATIENT MESSAGE (OUTPATIENT)
Dept: ENDOCRINOLOGY | Facility: CLINIC | Age: 42
End: 2022-11-30
Payer: MEDICAID

## 2022-12-01 ENCOUNTER — TELEPHONE (OUTPATIENT)
Dept: ENDOCRINOLOGY | Facility: CLINIC | Age: 42
End: 2022-12-01
Payer: MEDICAID

## 2022-12-01 DIAGNOSIS — E03.8 HYPOTHYROIDISM DUE TO HASHIMOTO'S THYROIDITIS: Primary | ICD-10-CM

## 2022-12-01 DIAGNOSIS — E06.3 HYPOTHYROIDISM DUE TO HASHIMOTO'S THYROIDITIS: Primary | ICD-10-CM

## 2022-12-01 RX ORDER — LEVOTHYROXINE SODIUM 50 UG/1
CAPSULE ORAL
Qty: 30 CAPSULE | Refills: 0 | Status: SHIPPED | OUTPATIENT
Start: 2022-12-01 | End: 2023-01-03

## 2022-12-01 NOTE — TELEPHONE ENCOUNTER
----- Message from Smiley Palomares sent at 12/1/2022 10:34 AM CST -----  Type:  Sooner Apoointment Request    Caller is requesting a sooner appointment.  Caller declined first available appointment listed below.  Caller will not accept being placed on the waitlist and is requesting a message be sent to doctor.  Name of Caller:pt  When is the first available appointment?none   Symptoms:annual   Would the patient rather a call back or a response via [facility]? Call back   Best Call Back Number:[phone]  Additional Information: pt states that she was told to call to schedule appt by nurse

## 2023-01-04 ENCOUNTER — DOCUMENTATION ONLY (OUTPATIENT)
Dept: REHABILITATION | Facility: HOSPITAL | Age: 43
End: 2023-01-04
Payer: MEDICAID

## 2023-01-04 DIAGNOSIS — R29.898 SHOULDER WEAKNESS: ICD-10-CM

## 2023-01-04 DIAGNOSIS — G89.29 CHRONIC LEFT SHOULDER PAIN: Primary | ICD-10-CM

## 2023-01-04 DIAGNOSIS — M25.612 DECREASED ROM OF LEFT SHOULDER: ICD-10-CM

## 2023-01-04 DIAGNOSIS — M25.512 CHRONIC LEFT SHOULDER PAIN: Primary | ICD-10-CM

## 2023-01-04 NOTE — PROGRESS NOTES
Outpatient Therapy Discharge Summary     Name: Juan Ramirez  [facility] Number: [id number]    Therapy Diagnosis:   Encounter Diagnoses   Name Primary?    Chronic left shoulder pain Yes    Shoulder weakness     Decreased ROM of left shoulder      Physician: No ref. provider found    Physician Orders: PT Eval and Treat   Medical Diagnosis from Referral: M75.32 (ICD-10-CM) - Calcific tendinitis of left shoulder region      Evaluation Date: 10/31/2022      Date of Last visit: 11/29/22  Total Visits Received: 15  Cancelled Visits: 2  No Show Visits: 1    Assessment    Goals:   Short Term Goals: 3-4 weeks   Increase shoulder external rotation AROM  by 20 degrees or more, progressing, not met  Increase external rotation strength by 1/2 grade ,progressing, not met  Increased shoulder Abd strength by 1/2 grade,progressing, not met  Decrease pain with ADLs by 2 points or more, progressing, not met     Long Term Goals: 8-12 weeks   Able to do bra with left arm,progressing, not met  Increase left shoulder ROM equal to right, progressing, not met  Increase left shoulder ROM to 4+/5, progressing, not met    Discharge reason: Patient has not attended therapy since 11/29/22    Plan   This patient is discharged from Physical Therapy

## 2023-01-26 ENCOUNTER — HOSPITAL ENCOUNTER (EMERGENCY)
Facility: HOSPITAL | Age: 43
Discharge: HOME | End: 2023-01-26
Attending: EMERGENCY MEDICINE
Payer: MEDICAID

## 2023-01-26 VITALS
BODY MASS INDEX: 21.71 KG/M2 | DIASTOLIC BLOOD PRESSURE: 74 MMHG | HEART RATE: 82 BPM | WEIGHT: 115 LBS | RESPIRATION RATE: 18 BRPM | TEMPERATURE: 99 F | OXYGEN SATURATION: 99 % | HEIGHT: 61 IN | SYSTOLIC BLOOD PRESSURE: 148 MMHG

## 2023-01-26 DIAGNOSIS — M79.601 PAIN OF RIGHT UPPER EXTREMITY: Primary | ICD-10-CM

## 2023-01-26 PROCEDURE — 99283 EMERGENCY DEPT VISIT LOW MDM: CPT | Mod: ,,, | Performed by: PHYSICIAN ASSISTANT

## 2023-01-26 PROCEDURE — 99283 PR EMERGENCY DEPT VISIT,LEVEL III: ICD-10-PCS | Mod: ,,, | Performed by: PHYSICIAN ASSISTANT

## 2023-01-26 PROCEDURE — 25000003 PHARM REV CODE 250: Performed by: PHYSICIAN ASSISTANT

## 2023-01-26 PROCEDURE — 99283 EMERGENCY DEPT VISIT LOW MDM: CPT

## 2023-01-26 RX ORDER — LIDOCAINE 50 MG/G
1 PATCH TOPICAL
Status: DISCONTINUED | OUTPATIENT
Start: 2023-01-26 | End: 2023-01-26 | Stop reason: HOSPADM

## 2023-01-26 RX ORDER — ACETAMINOPHEN 500 MG
1000 TABLET ORAL
Status: DISCONTINUED | OUTPATIENT
Start: 2023-01-26 | End: 2023-01-26

## 2023-01-26 RX ORDER — KETOROLAC TROMETHAMINE 30 MG/ML
15 INJECTION, SOLUTION INTRAMUSCULAR; INTRAVENOUS
Status: DISCONTINUED | OUTPATIENT
Start: 2023-01-26 | End: 2023-01-26

## 2023-01-26 RX ORDER — LIDOCAINE 50 MG/G
1 PATCH TOPICAL DAILY
Qty: 15 PATCH | Refills: 2 | Status: SHIPPED | OUTPATIENT
Start: 2023-01-26 | End: 2023-08-07

## 2023-01-26 RX ADMIN — LIDOCAINE 1 PATCH: 50 PATCH CUTANEOUS at 06:01

## 2023-01-27 ENCOUNTER — PATIENT MESSAGE (OUTPATIENT)
Dept: PHYSICAL MEDICINE AND REHAB | Facility: CLINIC | Age: 43
End: 2023-01-27
Payer: MEDICAID

## 2023-01-27 NOTE — DISCHARGE INSTRUCTIONS
Diagnosis: Arm pain    I suspect that your injury may have caused some damage to your muscle without a complete tear.  I am prescribing numbing patches that you can use for pain as needed. You should take Tylenol as needed for pain up to 3 grams daily which is 6 of the 500 mg extra strength tablets.    I sent a referral to Orthopedic surgery at [facility] for follow-up. Please call to schedule a follow-up appointment.  Please also follow-up with your primary care doctor and physical medicine and rehabilitation doctor.  If you start to have any worsening symptoms, please return to the emergency department.

## 2023-01-27 NOTE — ED PROVIDER NOTES
Encounter Date: 1/26/2023       History     Chief Complaint   Patient presents with    Arm Pain     Right arm pain started in November after playing with her daughter.      42-year-old female with hypothyroidism and history of calcific tendinitis of the left shoulder presents for persistent right upper arm pain for several months after minor trauma.  She reports initially her arm started hurting after playing badminton and then worsened acutely after her arm was pulled while walking her dog.  She saw a PM&R physician 2 months ago for the same complaint and was prescribed meloxicam.  Initially, her symptoms were mild and she not feel that she needed the medicine but the symptoms have worsened.  Pain is worse with any movement of that arm is now limiting her ADLs.  She denies any additional falls or trauma, weakness, numbness, arm swelling or pain in the contralateral arm.  She has been attempting to make an appointment with an orthopedist but has been unable to find a doctor who will take her insurance.    Review of patient's allergies indicates:  No Known Allergies  Past Medical History:   Diagnosis Date    Abnormal Pap smear of cervix     Abnormal Pap smear of vagina     hx LEEP    Primary hypothyroidism 06/09/2014    TPO +    Thyroid nodule 6/9/2014     Past Surgical History:   Procedure Laterality Date    CERVICAL BIOPSY  W/ LOOP ELECTRODE EXCISION  2006    ECTOPIC PREGNANCY SURGERY  2009    right salpingostomy    TENOTOMY OF SINGLE TENDON OF SHOULDER REGION Left 10/7/2022    Procedure: TENOTOMY, SHOULDER REGION, 1 TENDON;  Surgeon: Phill Fried MD;  Location: [facility];  Service: Orthopedics;  Laterality: Left;    TONSILLECTOMY       Family History   Problem Relation Age of Onset    Breast cancer Paternal Grandmother 58        inflammatory    Breast cancer Mother 44        unilat, s/p, chemo, unk if TNBC, no germline testing    Other Father         born with 1 kidney    Scoliosis Daughter         tested  negative for Charcot-Esther-Tooth    Scoliosis Son     Charcot-Esther-Tooth disease Son         from his father    Skin cancer Maternal Aunt         pt believes, ?type, nose (lived most of life in [place]_    No Known Problems Daughter     No Known Problems Other     No Known Problems Other     No Known Problems Other     Colon cancer Neg Hx     Ovarian cancer Neg Hx     Allergic rhinitis Neg Hx     Allergies Neg Hx     Angioedema Neg Hx     Asthma Neg Hx     Atopy Neg Hx     Eczema Neg Hx     Immunodeficiency Neg Hx     Rhinitis Neg Hx     Urticaria Neg Hx     Cancer Neg Hx      Social History     Tobacco Use    Smoking status: Former     Types: Cigarettes     Quit date: 3/3/1995     Years since quittin.9    Smokeless tobacco: Never   Substance Use Topics    Alcohol use: Never     Alcohol/week: 0.0 standard drinks     Comment: Occ.    Drug use: Never     Review of Systems    Physical Exam     Initial Vitals [23 1615]   BP Pulse Resp Temp SpO2   (!) 156/75 78 19 99 °F (37.2 °C) 98 %      MAP       --         Physical Exam    Nursing note and vitals reviewed.  Constitutional: She appears well-developed and well-nourished.   HENT:   Head: Normocephalic and atraumatic.   Eyes: EOM are normal. Pupils are equal, round, and reactive to light.   Neck: Neck supple.   Normal range of motion.  Cardiovascular:  Normal rate, regular rhythm, normal heart sounds and intact distal pulses.     Exam reveals no gallop and no friction rub.       No murmur heard.  Pulmonary/Chest: Breath sounds normal. No respiratory distress. She has no wheezes. She has no rhonchi. She has no rales. She exhibits no tenderness.   Musculoskeletal:      Right shoulder: Tenderness and bony tenderness present. Decreased range of motion.      Left shoulder: Normal.      Right upper arm: Tenderness present. No bony tenderness.      Left upper arm: Normal.      Right elbow: Normal.      Left elbow: Normal.      Right forearm: Normal.      Left forearm:  Normal.      Right wrist: Normal.      Left wrist: Normal.      Cervical back: Normal, normal range of motion and neck supple.      Thoracic back: Normal.      Lumbar back: Normal.      Comments: Tenderness to palpation of the biceps and anterior right arm.  No bony tenderness or deformity.  Difficulty fully abducting the right arm of the left of the shoulder.  Symptoms provoked with coke can test.    No arm swelling, erythema or warmth     Neurological: She is alert and oriented to person, place, and time.   Skin: Skin is warm and dry.   Psychiatric: She has a normal mood and affect.       ED Course   Procedures  Labs Reviewed - No data to display       Imaging Results    None          Medications - No data to display  Medical Decision Making:   History:   Old Medical Records: I decided to obtain old medical records.  Old Records Summarized: records from clinic visits.       <> Summary of Records: Patient seen in clinic for the same complaint 2 months ago.  She would x-rays performed which were negative for acute fracture or dislocation  Initial Assessment:   42-year-old female presenting for persistent right upper arm pain after a minor injury.  She is hypertensive 156/75 with otherwise normal vitals.  Differential Diagnosis:   Muscle strain   Rotator cuff injury   She has no bony tenderness or deformity, no recurrent trauma after recent x-ray.  I do not suspect fracture  Tendinitis  Her presentation is not consistent with complete biceps tendon rupture  Clinical Tests:   Radiological Study: Reviewed  ED Management:  Patient offered pain medication, she would like to try lidocaine patches but declined other medicines.  She is more concerned about the etiology of her symptoms.  I explained to her that I think she will need to be seen by an orthopedist and/or return to her PM&R physician given the persistence of her symptoms.  Discussed this patient with ED , patient should be able to be seen at [facility].  Referral placed. Stressed the importance of follow-up, strict ED return precautions given.  Patient voiced understanding and is comfortable with discharge.   Other:   I have discussed this case with another health care provider.           ED Course as of 01/27/23 0202   Thu Jan 26, 2023   1837 Case discussed with ED , patient should be able to be seen at [facility].  Will place referral. [CC]      ED Course User Index  [CC] Aster Rivero PA-C                 Clinical Impression:   Final diagnoses:  [M79.601] Pain of right upper extremity (Primary)        ED Disposition Condition    Discharge Stable          ED Prescriptions       Medication Sig Dispense Start Date End Date Auth. Provider    LIDOcaine (LIDODERM) 5 % Place 1 patch onto the skin once daily. Remove & Discard patch within 12 hours or as directed by MD 15 patch 1/26/2023 -- Aster Rivero PA-C          Follow-up Information       Follow up With Specialties Details Why Contact Info    [facility] - Musculosketetal Neuromusculoskeletal Medicine Schedule an appointment as soon as possible for a visit in 1 week  [address] [facility] [address]  [phone]      Bia Abad MD Family Medicine Schedule an appointment as soon as possible for a visit in 1 week  [address] [place] [address]  [phone]      [facility] - Emergency Dept Emergency Medicine Go to  If symptoms worsen [address]  [facility] [address] [phone]             Aster Rivero PA-C  01/27/23 0202

## 2023-01-27 NOTE — ED TRIAGE NOTES
Juan Ramirez, a 42 y.o. female presents to the ED w/ complaint of right shoulder pain     Triage note: Patient states that she plays [employer] and in November felt as if she injured her shoulder, Patient states she has had pain in this shoulder since this time. However reports pain got worse at the end of November when walking the dig and dog pulled on injured shoulder. Patient states that she has visited her primary care physician who did an xray noting no breaks or dislocations. Primary care recommended F/U with ortho for further evaluation but patient states she has bee unable to as they do not work with her insurance. 0/10 resting but pain increases with ADL patient states she is unable to dress or lift with pain.   Chief Complaint   Patient presents with    Arm Pain     Right arm pain started in November after playing with her daughter.      Review of patient's allergies indicates:  No Known Allergies  Past Medical History:   Diagnosis Date    Abnormal Pap smear of cervix     Abnormal Pap smear of vagina     hx LEEP    Primary hypothyroidism 06/09/2014    TPO +    Thyroid nodule 6/9/2014

## 2023-01-31 ENCOUNTER — OFFICE VISIT (OUTPATIENT)
Dept: OBSTETRICS AND GYNECOLOGY | Facility: CLINIC | Age: 43
End: 2023-01-31
Attending: OBSTETRICS & GYNECOLOGY
Payer: MEDICAID

## 2023-01-31 VITALS
BODY MASS INDEX: 22.07 KG/M2 | DIASTOLIC BLOOD PRESSURE: 74 MMHG | HEIGHT: 61 IN | SYSTOLIC BLOOD PRESSURE: 115 MMHG | WEIGHT: 116.88 LBS

## 2023-01-31 DIAGNOSIS — E03.8 HYPOTHYROIDISM DUE TO HASHIMOTO'S THYROIDITIS: ICD-10-CM

## 2023-01-31 DIAGNOSIS — E06.3 HYPOTHYROIDISM DUE TO HASHIMOTO'S THYROIDITIS: ICD-10-CM

## 2023-01-31 DIAGNOSIS — Z01.419 WELL WOMAN EXAM WITH ROUTINE GYNECOLOGICAL EXAM: Primary | ICD-10-CM

## 2023-01-31 DIAGNOSIS — Z12.39 SCREENING BREAST EXAMINATION: ICD-10-CM

## 2023-01-31 DIAGNOSIS — Z71.85 HPV VACCINE COUNSELING: ICD-10-CM

## 2023-01-31 DIAGNOSIS — Z80.3 FAMILY HISTORY OF BREAST CANCER: ICD-10-CM

## 2023-01-31 PROCEDURE — 3008F BODY MASS INDEX DOCD: CPT | Mod: CPTII,,, | Performed by: OBSTETRICS & GYNECOLOGY

## 2023-01-31 PROCEDURE — 99396 PR PREVENTIVE VISIT,EST,40-64: ICD-10-PCS | Mod: S$PBB,,, | Performed by: OBSTETRICS & GYNECOLOGY

## 2023-01-31 PROCEDURE — 99396 PREV VISIT EST AGE 40-64: CPT | Mod: S$PBB,,, | Performed by: OBSTETRICS & GYNECOLOGY

## 2023-01-31 PROCEDURE — 99999 PR PBB SHADOW E&M-EST. PATIENT-LVL III: CPT | Mod: PBBFAC,,, | Performed by: OBSTETRICS & GYNECOLOGY

## 2023-01-31 PROCEDURE — 1159F MED LIST DOCD IN RCRD: CPT | Mod: CPTII,,, | Performed by: OBSTETRICS & GYNECOLOGY

## 2023-01-31 PROCEDURE — 3078F DIAST BP <80 MM HG: CPT | Mod: CPTII,,, | Performed by: OBSTETRICS & GYNECOLOGY

## 2023-01-31 PROCEDURE — 3074F PR MOST RECENT SYSTOLIC BLOOD PRESSURE < 130 MM HG: ICD-10-PCS | Mod: CPTII,,, | Performed by: OBSTETRICS & GYNECOLOGY

## 2023-01-31 PROCEDURE — 1159F PR MEDICATION LIST DOCUMENTED IN MEDICAL RECORD: ICD-10-PCS | Mod: CPTII,,, | Performed by: OBSTETRICS & GYNECOLOGY

## 2023-01-31 PROCEDURE — 3074F SYST BP LT 130 MM HG: CPT | Mod: CPTII,,, | Performed by: OBSTETRICS & GYNECOLOGY

## 2023-01-31 PROCEDURE — 99999 PR PBB SHADOW E&M-EST. PATIENT-LVL III: ICD-10-PCS | Mod: PBBFAC,,, | Performed by: OBSTETRICS & GYNECOLOGY

## 2023-01-31 PROCEDURE — 88175 CYTOPATH C/V AUTO FLUID REDO: CPT | Performed by: OBSTETRICS & GYNECOLOGY

## 2023-01-31 PROCEDURE — 3008F PR BODY MASS INDEX (BMI) DOCUMENTED: ICD-10-PCS | Mod: CPTII,,, | Performed by: OBSTETRICS & GYNECOLOGY

## 2023-01-31 PROCEDURE — 3078F PR MOST RECENT DIASTOLIC BLOOD PRESSURE < 80 MM HG: ICD-10-PCS | Mod: CPTII,,, | Performed by: OBSTETRICS & GYNECOLOGY

## 2023-01-31 PROCEDURE — 87624 HPV HI-RISK TYP POOLED RSLT: CPT | Performed by: OBSTETRICS & GYNECOLOGY

## 2023-01-31 PROCEDURE — 99213 OFFICE O/P EST LOW 20 MIN: CPT | Mod: PBBFAC | Performed by: OBSTETRICS & GYNECOLOGY

## 2023-01-31 NOTE — PROGRESS NOTES
CC: Well woman exam    Juan Ramirez is a 42 y.o. female  presents for well woman exam.  LMP: Patient's last menstrual period was 2023 (exact date)..  No issues, problems, or complaints.  Due for mmg in July, last T-C score was 13%.  Due for pap, history of LEEP in the past.  Periods are 28-30 days apart.  Not heavy or painful.  Discussed gardasil vaccine.    Past Medical History:   Diagnosis Date    Abnormal Pap smear of cervix     Abnormal Pap smear of vagina     hx LEEP    Primary hypothyroidism 2014    TPO +    Thyroid nodule 2014     Past Surgical History:   Procedure Laterality Date    CERVICAL BIOPSY  W/ LOOP ELECTRODE EXCISION      ECTOPIC PREGNANCY SURGERY  2009    right salpingostomy    TENOTOMY OF SINGLE TENDON OF SHOULDER REGION Left 10/7/2022    Procedure: TENOTOMY, SHOULDER REGION, 1 TENDON;  Surgeon: Phill Fried MD;  Location: [facility];  Service: Orthopedics;  Laterality: Left;    TONSILLECTOMY       Social History     Socioeconomic History    Marital status: Single   Occupational History    Occupation: billing     Employer: [employer]     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    Drug use: Never    Sexual activity: Yes     Partners: Male     Birth control/protection: None   Other Topics Concern    Are you pregnant or think you may be? No    Breast-feeding No   Social History Narrative    Single and has three children.  One daughter and one son has Scoliosis- no other problems.     Family History   Problem Relation Age of Onset    Breast cancer Paternal Grandmother 58        inflammatory    Breast cancer Mother 44        unilat, s/p, chemo, unk if TNBC, no germline testing    Other Father          "born with 1 kidney    Scoliosis Daughter         tested negative for Charcot-Esther-Tooth    Scoliosis Son     Charcot-Esther-Tooth disease Son         from his father    Skin cancer Maternal Aunt         pt believes, ?type, nose (lived most of life in [place]_    No Known Problems Daughter     No Known Problems Other     No Known Problems Other     No Known Problems Other     Colon cancer Neg Hx     Ovarian cancer Neg Hx     Allergic rhinitis Neg Hx     Allergies Neg Hx     Angioedema Neg Hx     Asthma Neg Hx     Atopy Neg Hx     Eczema Neg Hx     Immunodeficiency Neg Hx     Rhinitis Neg Hx     Urticaria Neg Hx     Cancer Neg Hx      OB History          4    Para   3    Term   3            AB   1    Living   3         SAB        IAB        Ectopic   1    Multiple        Live Births   3                 /74   Ht 5' 1" (1.549 m)   Wt 53 kg (116 lb 13.5 oz)   LMP 2023 (Exact Date)   BMI 22.08 kg/m²       ROS:  GENERAL: Denies weight gain or weight loss. Feeling well overall.   SKIN: Denies rash or lesions.   HEAD: Denies head injury or headache.   NODES: Denies enlarged lymph nodes.   CHEST: Denies chest pain or shortness of breath.   CARDIOVASCULAR: Denies palpitations or left sided chest pain.   ABDOMEN: No abdominal pain, constipation, diarrhea, nausea, vomiting or rectal bleeding.   URINARY: No frequency, dysuria, hematuria, or burning on urination.  REPRODUCTIVE: See HPI.   BREASTS: The patient performs breast self-examination and denies pain, lumps, or nipple discharge.   HEMATOLOGIC: No easy bruisability or excessive bleeding.   MUSCULOSKELETAL: Denies joint pain or swelling.   NEUROLOGIC: Denies syncope or weakness.   PSYCHIATRIC: Denies depression, anxiety or mood swings.    PHYSICAL EXAM:  APPEARANCE: Well nourished, well developed, in no acute distress.  AFFECT: WNL, alert and oriented x 3  SKIN: No acne or hirsutism  NECK: Neck symmetric without masses or thyromegaly  NODES: No " inguinal, cervical, axillary, or femoral lymph node enlargement  CHEST: Good respiratory effect  ABDOMEN: Soft.  No tenderness or masses.  No hepatosplenomegaly.  No hernias.  BREASTS: Symmetrical, no skin changes or visible lesions.  No palpable masses, nipple discharge bilaterally.  PELVIC: Normal external genitalia without lesions.  Normal hair distribution.  Adequate perineal body, normal urethral meatus.  Vagina moist and well rugated without lesions or discharge.  Cervix pink, without lesions, discharge or tenderness.  No significant cystocele or rectocele.  Bimanual exam shows uterus to be normal size, regular, mobile and nontender.  Adnexa without masses or tenderness.    EXTREMITIES: No edema.    Well woman exam with routine gynecological exam  -     Liquid-Based Pap Smear, Screening  -     HPV High Risk Genotypes, PCR    Screening breast examination  -     Mammo Digital Screening Bilat w/ Aime; Future; Expected date: 01/31/2023    Hypothyroidism due to Hashimoto's thyroiditis    Family history of breast cancer    HPV vaccine counseling  -     (In Office Administered) HPV Vaccine (9-Valent) (3 Dose) (IM)            Patient was counseled today on A.C.S. Pap guidelines and recommendations for yearly pelvic exams, mammograms and monthly self breast exams; to see her PCP for other health maintenance.     No follow-ups on file.

## 2023-02-03 ENCOUNTER — OFFICE VISIT (OUTPATIENT)
Dept: PHYSICAL MEDICINE AND REHAB | Facility: CLINIC | Age: 43
End: 2023-02-03
Payer: MEDICAID

## 2023-02-03 VITALS
SYSTOLIC BLOOD PRESSURE: 114 MMHG | BODY MASS INDEX: 22.51 KG/M2 | HEART RATE: 54 BPM | WEIGHT: 119.25 LBS | DIASTOLIC BLOOD PRESSURE: 62 MMHG | HEIGHT: 61 IN

## 2023-02-03 DIAGNOSIS — M25.511 CHRONIC RIGHT SHOULDER PAIN: Primary | ICD-10-CM

## 2023-02-03 DIAGNOSIS — M75.01 ADHESIVE CAPSULITIS OF RIGHT SHOULDER: ICD-10-CM

## 2023-02-03 DIAGNOSIS — G89.29 CHRONIC RIGHT SHOULDER PAIN: Primary | ICD-10-CM

## 2023-02-03 PROCEDURE — 3078F PR MOST RECENT DIASTOLIC BLOOD PRESSURE < 80 MM HG: ICD-10-PCS | Mod: CPTII,,, | Performed by: PHYSICAL MEDICINE & REHABILITATION

## 2023-02-03 PROCEDURE — 99213 OFFICE O/P EST LOW 20 MIN: CPT | Mod: PBBFAC,PN | Performed by: PHYSICAL MEDICINE & REHABILITATION

## 2023-02-03 PROCEDURE — 99214 OFFICE O/P EST MOD 30 MIN: CPT | Mod: S$PBB,,, | Performed by: PHYSICAL MEDICINE & REHABILITATION

## 2023-02-03 PROCEDURE — 1160F RVW MEDS BY RX/DR IN RCRD: CPT | Mod: CPTII,,, | Performed by: PHYSICAL MEDICINE & REHABILITATION

## 2023-02-03 PROCEDURE — 3074F PR MOST RECENT SYSTOLIC BLOOD PRESSURE < 130 MM HG: ICD-10-PCS | Mod: CPTII,,, | Performed by: PHYSICAL MEDICINE & REHABILITATION

## 2023-02-03 PROCEDURE — 3078F DIAST BP <80 MM HG: CPT | Mod: CPTII,,, | Performed by: PHYSICAL MEDICINE & REHABILITATION

## 2023-02-03 PROCEDURE — 1159F PR MEDICATION LIST DOCUMENTED IN MEDICAL RECORD: ICD-10-PCS | Mod: CPTII,,, | Performed by: PHYSICAL MEDICINE & REHABILITATION

## 2023-02-03 PROCEDURE — 99999 PR PBB SHADOW E&M-EST. PATIENT-LVL III: ICD-10-PCS | Mod: PBBFAC,,, | Performed by: PHYSICAL MEDICINE & REHABILITATION

## 2023-02-03 PROCEDURE — 3074F SYST BP LT 130 MM HG: CPT | Mod: CPTII,,, | Performed by: PHYSICAL MEDICINE & REHABILITATION

## 2023-02-03 PROCEDURE — 99214 PR OFFICE/OUTPT VISIT, EST, LEVL IV, 30-39 MIN: ICD-10-PCS | Mod: S$PBB,,, | Performed by: PHYSICAL MEDICINE & REHABILITATION

## 2023-02-03 PROCEDURE — 3008F PR BODY MASS INDEX (BMI) DOCUMENTED: ICD-10-PCS | Mod: CPTII,,, | Performed by: PHYSICAL MEDICINE & REHABILITATION

## 2023-02-03 PROCEDURE — 3008F BODY MASS INDEX DOCD: CPT | Mod: CPTII,,, | Performed by: PHYSICAL MEDICINE & REHABILITATION

## 2023-02-03 PROCEDURE — 1159F MED LIST DOCD IN RCRD: CPT | Mod: CPTII,,, | Performed by: PHYSICAL MEDICINE & REHABILITATION

## 2023-02-03 PROCEDURE — 99999 PR PBB SHADOW E&M-EST. PATIENT-LVL III: CPT | Mod: PBBFAC,,, | Performed by: PHYSICAL MEDICINE & REHABILITATION

## 2023-02-03 PROCEDURE — 1160F PR REVIEW ALL MEDS BY PRESCRIBER/CLIN PHARMACIST DOCUMENTED: ICD-10-PCS | Mod: CPTII,,, | Performed by: PHYSICAL MEDICINE & REHABILITATION

## 2023-02-03 NOTE — PROGRESS NOTES
[facility]    CHIEF COMPLAINT:   Chief Complaint   Patient presents with    Arm Pain     C/o right arm pain     Shoulder Pain     C/o right shoulder pain        HISTORY OF PRESENT ILLNESS: Juan Ramirez is a 42 y.o. female who presents to me for follow up evaluation and management of chronic right shoulder pain.    Last seen in clinic, 11/14/2022 at which time she wanted to avoid intra-articular shoulder injections with plans to trial meloxicam once daily for one week.     01/27/2023 reached out to our clinic requesting re-evaluation of her right shoulder secondary to worsening pain with noted weakness and difficulty with ADLs.     Today reports, shoulder pain states that after last visit she completed 7 day trial of meloxicam with only noted minimal benefit but at the time was able to move her shoulder all around.  She states that the end of December and beginning of this year she has noticed increased pain in her right shoulder without noted traumatic event, now the pain is so severe she is limited range of motion in his unable to perform self hygiene with bathing and grooming needs.  The pain starts at her shoulder and radiates predominantly down into her right arm.  She did not endorse some neck pain a couple days ago but this has since resolved.  Denies hand weakness.  Denies numbness and tingling of the right lower extremity.  Left shoulder symptoms are stable at this time.      Medications:  None, previously has trialed oral steroids as well as meloxicam   Injections:  None, at this time would like to avoid  Therapies:  No formal for right shoulder, is currently in formal physical therapy 2 times a week for her left shoulder.  Bracing:  None  DME:  None    Review of Systems   Constitutional: Negative for fever.   HENT: Negative for drooling.    Eyes: Negative for discharge.   Respiratory: Negative for choking.    Cardiovascular: Negative for chest pain.    Genitourinary: Negative for flank pain.   Skin: Negative for wound.   Allergic/Immunologic: Negative for immunocompromised state.   Neurological: Negative for tremors and syncope.   Psychiatric/Behavioral: Negative for behavioral problems.     Past Medical History:   Past Medical History:   Diagnosis Date    Abnormal Pap smear of cervix     Abnormal Pap smear of vagina     hx LEEP    Primary hypothyroidism 06/09/2014    TPO +    Thyroid nodule 6/9/2014       Past Surgical History:   Past Surgical History:   Procedure Laterality Date    CERVICAL BIOPSY  W/ LOOP ELECTRODE EXCISION  2006    ECTOPIC PREGNANCY SURGERY  2009    right salpingostomy    TENOTOMY OF SINGLE TENDON OF SHOULDER REGION Left 10/7/2022    Procedure: TENOTOMY, SHOULDER REGION, 1 TENDON;  Surgeon: Phill Fried MD;  Location: [facility];  Service: Orthopedics;  Laterality: Left;    TONSILLECTOMY         Family History:   Family History   Problem Relation Age of Onset    Breast cancer Paternal Grandmother 58        inflammatory    Breast cancer Mother 44        unilat, s/p, chemo, unk if TNBC, no germline testing    Other Father         born with 1 kidney    Scoliosis Daughter         tested negative for Charcot-Esther-Tooth    Scoliosis Son     Charcot-Esther-Tooth disease Son         from his father    Skin cancer Maternal Aunt         pt believes, ?type, nose (lived most of life in [place]_    No Known Problems Daughter     No Known Problems Other     No Known Problems Other     No Known Problems Other     Colon cancer Neg Hx     Ovarian cancer Neg Hx     Allergic rhinitis Neg Hx     Allergies Neg Hx     Angioedema Neg Hx     Asthma Neg Hx     Atopy Neg Hx     Eczema Neg Hx     Immunodeficiency Neg Hx     Rhinitis Neg Hx     Urticaria Neg Hx     Cancer Neg Hx        Medications:   Current Outpatient Medications on File Prior to Visit   Medication Sig Dispense Refill    doxycycline (VIBRA-TABS) 100 MG tablet Take 100 mg by mouth once daily.      ID  NOW COVID-19 TEST KIT Kit TEST AS DIRECTED TODAY      ivermectin (SOOLANTRA) 1 % Crea Apply 1 ampule topically every evening. 30 g 3    letrozole (FEMARA) 2.5 mg Tab Take 1 tablet (2.5 mg total) by mouth once daily. 5 tablet 0    letrozole (FEMARA) 2.5 mg Tab Take 1 tablet (2.5 mg total) by mouth once daily Take on cycle days 3-7. 5 tablet 0    LIDOcaine (LIDODERM) 5 % Place 1 patch onto the skin once daily. Remove & Discard patch within 12 hours or as directed by MD 15 patch 2    meloxicam (MOBIC) 15 MG tablet Take 15 mg by mouth once daily.      metroNIDAZOLE (METROGEL) 0.75 % gel Apply topically once daily. 45 g 5    metronidazole 1% (METROGEL) 1 % Gel Apply topically once daily. 60 g 0    RETIN-A 0.025 % cream       TIROSINT 13 mcg Cap TAKE 1 CAPSULE BY MOUTH EVERY DAY WITH THE 50 MCG 30 capsule 6    TIROSINT 50 mcg Cap TAKE 1 CAPSULE BY MOUTH EVERY DAY 30 capsule 6    traMADoL (ULTRAM) 50 mg tablet Take 1 tablet (50 mg total) by mouth every 24 hours as needed for Pain. 15 tablet 0     No current facility-administered medications on file prior to visit.       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    Drug use: Never    Sexual activity: Yes     Partners: Male     Birth control/protection: None   Other Topics Concern    Are you pregnant or think you may be? No    Breast-feeding No   Social History Narrative    Single and has three children.  One daughter and one son has Scoliosis- no other problems.       PHYSICAL  "EXAMINATION:   Vitals:    02/03/23 0927   BP: 114/62   Pulse: (!) 54   Weight: 54.1 kg (119 lb 4.3 oz)   Height: 5' 1" (1.549 m)       Constitutional: No apparent distress. Pleasant.  HENT: Trachea midline.  Head: Normocephalic and atraumatic. Head and neck revealed no asymmetry, no masses to be present.  Eyes: Right eye exhibits no discharge. Left eye exhibits no discharge. No scleral icterus.   CV: Well perfused.   Pulmonary/Chest: Effort normal. No respiratory distress.   Abdominal: There is no guarding.   Neurological: Awake, alert and cooperative.  SKIN: Intact no apparent lesions, cut, ulcers or abrasions  EXT:  No cyanosis, clubbing, or edema.  SENSORY: Intact to light touch in the bilateral upper extemities.  MUSCKULOSKELETAL:   Muscle Strength:(0-5)                              Right     Left  Shoulder abduction:               4  5  Shoulder external rotation:     4  5  Elbow flexion:              5  5  Elbow extension:          5  5  Wrist extension:             5  5  Wrist flexion:             5  5  Finger extension:        5  5  Finger flexion:   5  5  Finger abduction:  5  5    Reflexes: 0-4+/4   Right     Left  Biceps (C5):  2+  2+  Brachioradialis (C6): 2+  2+  Triceps (C7):  2+  2+    INSPECTION:         Right     Left   Localized/Generalized swelling:   -  -  Muscle contours normal and symmetrical: +  +  Ecchymoses:      -  -  Erythema:      -  -  Gross deformity:    -  -  Subluxation/Dislocation:   -  -    SHOULDER DEFORMITY:            Right      Left  Normal:          +  +  Other:    RANGE OF MOTION:           Right      Left  Flexion:  limited  Full   Extension:  limited  Full  Internal rotation:  limited  Full  External rotation:  limited  Full  Abduction:  limited  Full  Adduction:  limited  Full  Other:  Significant loss in all planes of range of motion to the right shoulder    TENDERNESS:                 Right      Left  AC joint:        +  -  Subacromial bursa:  +  -  Biceps " tendon:  -  -  Supraspinatus tendon: +  -  Other:     SOFT TISSUE PALPATION:      Right  Left   Effusion:  -  -  Other:        SPECIAL TESTS:         Right     Left  Drop arm:  +  -  Painful arc:  +  -  Nieto:  +  -  Neer:   +  -  Yergason:  -  -  Speed:   -  -  Cross-arm:  +  -  [place]'s:  +  -  Empty can:  +  -  [place]ling's:  -  -  Wrist clonus:  -  -  Martinez's:  -  -    IMAGING:  XR right shoulder from 11/14/2022 without noted bony pathology.     Data Reviewed: X-ray  Supportive Actions: Independent visualization of images or test specimens.    ASSESSMENT:   1. Chronic right shoulder pain    2. Adhesive capsulitis of right shoulder          PLAN:   1. Time was spent reviewing the above diagnosis in depth with Juan today, including acute management and rehabilitation.     2. Physical examination consistent with severe right intra-articular shoulder pain with loss of range of motion active and passively in all planes.  We discussed management options with oral medications versus topical medications versus intra-articular injections with steroids versus platelet rich plasma.  At this time patient does not want to proceed with any injections, but in the future may consider platelet rich plasma injections.    3. With noted severe increase in shoulder pain as well as loss of range of motion and reported weakness on examination we will obtain MRI of right shoulder for further evaluation and suspicion for underlying adhesive capsulitis versus rotator cuff pathology    We will call patient with results of MRI depending on findings will dictate whether continue with conservative management or consideration for further evaluation by us ortho surgeon.

## 2023-02-13 ENCOUNTER — HOSPITAL ENCOUNTER (OUTPATIENT)
Dept: RADIOLOGY | Facility: HOSPITAL | Age: 43
Discharge: HOME | End: 2023-02-13
Attending: PHYSICAL MEDICINE & REHABILITATION
Payer: MEDICAID

## 2023-02-13 DIAGNOSIS — G89.29 CHRONIC RIGHT SHOULDER PAIN: ICD-10-CM

## 2023-02-13 DIAGNOSIS — M25.511 CHRONIC RIGHT SHOULDER PAIN: ICD-10-CM

## 2023-02-13 PROCEDURE — 73221 MRI JOINT UPR EXTREM W/O DYE: CPT | Mod: 26,RT,, | Performed by: RADIOLOGY

## 2023-02-13 PROCEDURE — 73221 MRI SHOULDER WITHOUT CONTRAST RIGHT: ICD-10-PCS | Mod: 26,RT,, | Performed by: RADIOLOGY

## 2023-02-13 PROCEDURE — 73221 MRI JOINT UPR EXTREM W/O DYE: CPT | Mod: TC,PO,RT

## 2023-02-14 ENCOUNTER — OFFICE VISIT (OUTPATIENT)
Dept: RHEUMATOLOGY | Facility: CLINIC | Age: 43
End: 2023-02-14
Payer: MEDICAID

## 2023-02-14 VITALS
WEIGHT: 115.31 LBS | DIASTOLIC BLOOD PRESSURE: 80 MMHG | SYSTOLIC BLOOD PRESSURE: 113 MMHG | HEIGHT: 61 IN | BODY MASS INDEX: 21.77 KG/M2 | HEART RATE: 81 BPM

## 2023-02-14 DIAGNOSIS — H20.9 UVEITIS: Primary | ICD-10-CM

## 2023-02-14 DIAGNOSIS — E03.8 HYPOTHYROIDISM DUE TO HASHIMOTO'S THYROIDITIS: ICD-10-CM

## 2023-02-14 DIAGNOSIS — L71.9 ROSACEA: ICD-10-CM

## 2023-02-14 DIAGNOSIS — M75.51 CHRONIC SHOULDER BURSITIS, RIGHT: ICD-10-CM

## 2023-02-14 DIAGNOSIS — E06.3 HYPOTHYROIDISM DUE TO HASHIMOTO'S THYROIDITIS: ICD-10-CM

## 2023-02-14 DIAGNOSIS — M75.111 INCOMPLETE TEAR OF RIGHT ROTATOR CUFF, UNSPECIFIED WHETHER TRAUMATIC: ICD-10-CM

## 2023-02-14 DIAGNOSIS — E06.3 HASHIMOTO'S THYROIDITIS: ICD-10-CM

## 2023-02-14 PROCEDURE — 3008F BODY MASS INDEX DOCD: CPT | Mod: CPTII,,, | Performed by: INTERNAL MEDICINE

## 2023-02-14 PROCEDURE — 99999 PR PBB SHADOW E&M-EST. PATIENT-LVL III: ICD-10-PCS | Mod: PBBFAC,,, | Performed by: INTERNAL MEDICINE

## 2023-02-14 PROCEDURE — 99213 OFFICE O/P EST LOW 20 MIN: CPT | Mod: PBBFAC,PN | Performed by: INTERNAL MEDICINE

## 2023-02-14 PROCEDURE — 99999 PR PBB SHADOW E&M-EST. PATIENT-LVL III: CPT | Mod: PBBFAC,,, | Performed by: INTERNAL MEDICINE

## 2023-02-14 PROCEDURE — 99215 PR OFFICE/OUTPT VISIT, EST, LEVL V, 40-54 MIN: ICD-10-PCS | Mod: S$PBB,,, | Performed by: INTERNAL MEDICINE

## 2023-02-14 PROCEDURE — 3008F PR BODY MASS INDEX (BMI) DOCUMENTED: ICD-10-PCS | Mod: CPTII,,, | Performed by: INTERNAL MEDICINE

## 2023-02-14 PROCEDURE — 3074F SYST BP LT 130 MM HG: CPT | Mod: CPTII,,, | Performed by: INTERNAL MEDICINE

## 2023-02-14 PROCEDURE — 3079F DIAST BP 80-89 MM HG: CPT | Mod: CPTII,,, | Performed by: INTERNAL MEDICINE

## 2023-02-14 PROCEDURE — 3074F PR MOST RECENT SYSTOLIC BLOOD PRESSURE < 130 MM HG: ICD-10-PCS | Mod: CPTII,,, | Performed by: INTERNAL MEDICINE

## 2023-02-14 PROCEDURE — 99215 OFFICE O/P EST HI 40 MIN: CPT | Mod: S$PBB,,, | Performed by: INTERNAL MEDICINE

## 2023-02-14 PROCEDURE — 3079F PR MOST RECENT DIASTOLIC BLOOD PRESSURE 80-89 MM HG: ICD-10-PCS | Mod: CPTII,,, | Performed by: INTERNAL MEDICINE

## 2023-02-14 RX ORDER — GENTAMICIN SULFATE 3 MG/ML
1 SOLUTION/ DROPS OPHTHALMIC EVERY 4 HOURS
Qty: 10 ML | Refills: 1 | Status: SHIPPED | OUTPATIENT
Start: 2023-02-14 | End: 2023-03-01

## 2023-02-14 ASSESSMENT — ROUTINE ASSESSMENT OF PATIENT INDEX DATA (RAPID3)
FATIGUE SCORE: 1.1
MDHAQ FUNCTION SCORE: 0.5
PAIN SCORE: 3
PSYCHOLOGICAL DISTRESS SCORE: 0
PATIENT GLOBAL ASSESSMENT SCORE: 1
TOTAL RAPID3 SCORE: 1.89

## 2023-02-15 ENCOUNTER — PATIENT MESSAGE (OUTPATIENT)
Dept: OBSTETRICS AND GYNECOLOGY | Facility: CLINIC | Age: 43
End: 2023-02-15
Payer: MEDICAID

## 2023-02-15 ENCOUNTER — PATIENT MESSAGE (OUTPATIENT)
Dept: PHYSICAL MEDICINE AND REHAB | Facility: CLINIC | Age: 43
End: 2023-02-15
Payer: MEDICAID

## 2023-02-17 ENCOUNTER — LAB VISIT (OUTPATIENT)
Dept: LAB | Facility: HOSPITAL | Age: 43
End: 2023-02-17
Attending: INTERNAL MEDICINE
Payer: MEDICAID

## 2023-02-17 DIAGNOSIS — E03.8 HYPOTHYROIDISM DUE TO HASHIMOTO'S THYROIDITIS: ICD-10-CM

## 2023-02-17 DIAGNOSIS — E06.3 HYPOTHYROIDISM DUE TO HASHIMOTO'S THYROIDITIS: ICD-10-CM

## 2023-02-17 DIAGNOSIS — L71.9 ROSACEA: ICD-10-CM

## 2023-02-17 DIAGNOSIS — M75.111 INCOMPLETE TEAR OF RIGHT ROTATOR CUFF, UNSPECIFIED WHETHER TRAUMATIC: ICD-10-CM

## 2023-02-17 DIAGNOSIS — M75.51 CHRONIC SHOULDER BURSITIS, RIGHT: ICD-10-CM

## 2023-02-17 DIAGNOSIS — E06.3 HASHIMOTO'S THYROIDITIS: ICD-10-CM

## 2023-02-17 DIAGNOSIS — H20.9 UVEITIS: ICD-10-CM

## 2023-02-17 LAB
BASOPHILS # BLD AUTO: 0.07 K/UL (ref 0–0.2)
BASOPHILS NFR BLD: 1 % (ref 0–1.9)
DIFFERENTIAL METHOD: ABNORMAL
EOSINOPHIL # BLD AUTO: 0.1 K/UL (ref 0–0.5)
EOSINOPHIL NFR BLD: 1.3 % (ref 0–8)
ERYTHROCYTE [DISTWIDTH] IN BLOOD BY AUTOMATED COUNT: 11.9 % (ref 11.5–14.5)
ERYTHROCYTE [SEDIMENTATION RATE] IN BLOOD BY PHOTOMETRIC METHOD: 13 MM/HR (ref 0–36)
HCT VFR BLD AUTO: 45.1 % (ref 37–48.5)
HGB BLD-MCNC: 14.3 G/DL (ref 12–16)
IMM GRANULOCYTES # BLD AUTO: 0.03 K/UL (ref 0–0.04)
IMM GRANULOCYTES NFR BLD AUTO: 0.4 % (ref 0–0.5)
LYMPHOCYTES # BLD AUTO: 1.9 K/UL (ref 1–4.8)
LYMPHOCYTES NFR BLD: 28.3 % (ref 18–48)
MCH RBC QN AUTO: 30.4 PG (ref 27–31)
MCHC RBC AUTO-ENTMCNC: 31.7 G/DL (ref 32–36)
MCV RBC AUTO: 96 FL (ref 82–98)
MONOCYTES # BLD AUTO: 0.5 K/UL (ref 0.3–1)
MONOCYTES NFR BLD: 7 % (ref 4–15)
NEUTROPHILS # BLD AUTO: 4.2 K/UL (ref 1.8–7.7)
NEUTROPHILS NFR BLD: 62 % (ref 38–73)
NRBC BLD-RTO: 0 /100 WBC
PLATELET # BLD AUTO: 290 K/UL (ref 150–450)
PMV BLD AUTO: 10.9 FL (ref 9.2–12.9)
RBC # BLD AUTO: 4.7 M/UL (ref 4–5.4)
THYROGLOB AB SERPL IA-ACNC: 7.8 IU/ML (ref 0–3.9)
THYROPEROXIDASE IGG SERPL-ACNC: 127.6 IU/ML
WBC # BLD AUTO: 6.71 K/UL (ref 3.9–12.7)

## 2023-02-17 PROCEDURE — 86140 C-REACTIVE PROTEIN: CPT | Performed by: INTERNAL MEDICINE

## 2023-02-17 PROCEDURE — 85652 RBC SED RATE AUTOMATED: CPT | Performed by: INTERNAL MEDICINE

## 2023-02-17 PROCEDURE — 86800 THYROGLOBULIN ANTIBODY: CPT | Performed by: INTERNAL MEDICINE

## 2023-02-17 PROCEDURE — 86003 ALLG SPEC IGE CRUDE XTRC EA: CPT | Mod: 59 | Performed by: INTERNAL MEDICINE

## 2023-02-17 PROCEDURE — 86003 ALLG SPEC IGE CRUDE XTRC EA: CPT | Performed by: INTERNAL MEDICINE

## 2023-02-17 PROCEDURE — 84443 ASSAY THYROID STIM HORMONE: CPT | Performed by: INTERNAL MEDICINE

## 2023-02-17 PROCEDURE — 86376 MICROSOMAL ANTIBODY EACH: CPT | Performed by: INTERNAL MEDICINE

## 2023-02-17 PROCEDURE — 84439 ASSAY OF FREE THYROXINE: CPT | Performed by: INTERNAL MEDICINE

## 2023-02-17 PROCEDURE — 84480 ASSAY TRIIODOTHYRONINE (T3): CPT | Performed by: INTERNAL MEDICINE

## 2023-02-17 PROCEDURE — 85025 COMPLETE CBC W/AUTO DIFF WBC: CPT | Performed by: INTERNAL MEDICINE

## 2023-02-17 PROCEDURE — 80053 COMPREHEN METABOLIC PANEL: CPT | Performed by: INTERNAL MEDICINE

## 2023-02-18 LAB
ALBUMIN SERPL BCP-MCNC: 3.9 G/DL (ref 3.5–5.2)
ALP SERPL-CCNC: 55 U/L (ref 55–135)
ALT SERPL W/O P-5'-P-CCNC: 10 U/L (ref 10–44)
ANION GAP SERPL CALC-SCNC: 11 MMOL/L (ref 8–16)
AST SERPL-CCNC: 14 U/L (ref 10–40)
BILIRUB SERPL-MCNC: 0.7 MG/DL (ref 0.1–1)
BUN SERPL-MCNC: 8 MG/DL (ref 6–20)
CALCIUM SERPL-MCNC: 9.8 MG/DL (ref 8.7–10.5)
CHLORIDE SERPL-SCNC: 104 MMOL/L (ref 95–110)
CO2 SERPL-SCNC: 26 MMOL/L (ref 23–29)
CREAT SERPL-MCNC: 0.8 MG/DL (ref 0.5–1.4)
CRP SERPL-MCNC: <0.3 MG/L (ref 0–8.2)
EST. GFR  (NO RACE VARIABLE): >60 ML/MIN/1.73 M^2
GLUCOSE SERPL-MCNC: 94 MG/DL (ref 70–110)
POTASSIUM SERPL-SCNC: 3.9 MMOL/L (ref 3.5–5.1)
PROT SERPL-MCNC: 7.4 G/DL (ref 6–8.4)
SODIUM SERPL-SCNC: 141 MMOL/L (ref 136–145)
T3 SERPL-MCNC: 83 NG/DL (ref 60–180)
T4 FREE SERPL-MCNC: 0.97 NG/DL (ref 0.71–1.51)
TSH SERPL DL<=0.005 MIU/L-ACNC: 3.13 UIU/ML (ref 0.4–4)

## 2023-02-20 LAB
CELERY IGE QN: <0.1 KU/L
CHILI PEPPER IGE QN: <0.1 KU/L
DEPRECATED CELERY IGE RAST QL: NORMAL
DEPRECATED CHILI PEPPER IGE RAST QL: NORMAL
DEPRECATED HAZELNUT IGE RAST QL: NORMAL
DEPRECATED OREGANO IGE RAST QL: NORMAL
DEPRECATED SOYBEAN IGE RAST QL: NORMAL
HAZELNUT IGE QN: <0.1 KU/L
OREGANO IGE QN: <0.1 KU/L
SOYBEAN IGE QN: <0.1 KU/L

## 2023-02-23 ENCOUNTER — OFFICE VISIT (OUTPATIENT)
Dept: SPORTS MEDICINE | Facility: CLINIC | Age: 43
End: 2023-02-23
Payer: MEDICAID

## 2023-02-23 VITALS
DIASTOLIC BLOOD PRESSURE: 74 MMHG | BODY MASS INDEX: 22.58 KG/M2 | WEIGHT: 115 LBS | SYSTOLIC BLOOD PRESSURE: 117 MMHG | HEIGHT: 60 IN

## 2023-02-23 DIAGNOSIS — M75.51 CHRONIC SHOULDER BURSITIS, RIGHT: ICD-10-CM

## 2023-02-23 DIAGNOSIS — G89.29 CHRONIC RIGHT SHOULDER PAIN: Primary | ICD-10-CM

## 2023-02-23 DIAGNOSIS — S46.011A TRAUMATIC INCOMPLETE TEAR OF RIGHT ROTATOR CUFF, INITIAL ENCOUNTER: ICD-10-CM

## 2023-02-23 DIAGNOSIS — M25.511 CHRONIC RIGHT SHOULDER PAIN: Primary | ICD-10-CM

## 2023-02-23 PROCEDURE — 1159F MED LIST DOCD IN RCRD: CPT | Mod: CPTII,,, | Performed by: STUDENT IN AN ORGANIZED HEALTH CARE EDUCATION/TRAINING PROGRAM

## 2023-02-23 PROCEDURE — 3074F SYST BP LT 130 MM HG: CPT | Mod: CPTII,,, | Performed by: STUDENT IN AN ORGANIZED HEALTH CARE EDUCATION/TRAINING PROGRAM

## 2023-02-23 PROCEDURE — 1125F PR PAIN SEVERITY QUANTIFIED, PAIN PRESENT: ICD-10-PCS | Mod: CPTII,,, | Performed by: STUDENT IN AN ORGANIZED HEALTH CARE EDUCATION/TRAINING PROGRAM

## 2023-02-23 PROCEDURE — 99999 PR PBB SHADOW E&M-EST. PATIENT-LVL III: ICD-10-PCS | Mod: PBBFAC,,, | Performed by: STUDENT IN AN ORGANIZED HEALTH CARE EDUCATION/TRAINING PROGRAM

## 2023-02-23 PROCEDURE — 99213 OFFICE O/P EST LOW 20 MIN: CPT | Mod: PBBFAC | Performed by: STUDENT IN AN ORGANIZED HEALTH CARE EDUCATION/TRAINING PROGRAM

## 2023-02-23 PROCEDURE — 3008F PR BODY MASS INDEX (BMI) DOCUMENTED: ICD-10-PCS | Mod: CPTII,,, | Performed by: STUDENT IN AN ORGANIZED HEALTH CARE EDUCATION/TRAINING PROGRAM

## 2023-02-23 PROCEDURE — 3008F BODY MASS INDEX DOCD: CPT | Mod: CPTII,,, | Performed by: STUDENT IN AN ORGANIZED HEALTH CARE EDUCATION/TRAINING PROGRAM

## 2023-02-23 PROCEDURE — 99204 OFFICE O/P NEW MOD 45 MIN: CPT | Mod: S$PBB,,, | Performed by: STUDENT IN AN ORGANIZED HEALTH CARE EDUCATION/TRAINING PROGRAM

## 2023-02-23 PROCEDURE — 1160F RVW MEDS BY RX/DR IN RCRD: CPT | Mod: CPTII,,, | Performed by: STUDENT IN AN ORGANIZED HEALTH CARE EDUCATION/TRAINING PROGRAM

## 2023-02-23 PROCEDURE — 1160F PR REVIEW ALL MEDS BY PRESCRIBER/CLIN PHARMACIST DOCUMENTED: ICD-10-PCS | Mod: CPTII,,, | Performed by: STUDENT IN AN ORGANIZED HEALTH CARE EDUCATION/TRAINING PROGRAM

## 2023-02-23 PROCEDURE — 3078F DIAST BP <80 MM HG: CPT | Mod: CPTII,,, | Performed by: STUDENT IN AN ORGANIZED HEALTH CARE EDUCATION/TRAINING PROGRAM

## 2023-02-23 PROCEDURE — 1125F AMNT PAIN NOTED PAIN PRSNT: CPT | Mod: CPTII,,, | Performed by: STUDENT IN AN ORGANIZED HEALTH CARE EDUCATION/TRAINING PROGRAM

## 2023-02-23 PROCEDURE — 99999 PR PBB SHADOW E&M-EST. PATIENT-LVL III: CPT | Mod: PBBFAC,,, | Performed by: STUDENT IN AN ORGANIZED HEALTH CARE EDUCATION/TRAINING PROGRAM

## 2023-02-23 PROCEDURE — 3074F PR MOST RECENT SYSTOLIC BLOOD PRESSURE < 130 MM HG: ICD-10-PCS | Mod: CPTII,,, | Performed by: STUDENT IN AN ORGANIZED HEALTH CARE EDUCATION/TRAINING PROGRAM

## 2023-02-23 PROCEDURE — 3078F PR MOST RECENT DIASTOLIC BLOOD PRESSURE < 80 MM HG: ICD-10-PCS | Mod: CPTII,,, | Performed by: STUDENT IN AN ORGANIZED HEALTH CARE EDUCATION/TRAINING PROGRAM

## 2023-02-23 PROCEDURE — 1159F PR MEDICATION LIST DOCUMENTED IN MEDICAL RECORD: ICD-10-PCS | Mod: CPTII,,, | Performed by: STUDENT IN AN ORGANIZED HEALTH CARE EDUCATION/TRAINING PROGRAM

## 2023-02-23 PROCEDURE — 99204 PR OFFICE/OUTPT VISIT, NEW, LEVL IV, 45-59 MIN: ICD-10-PCS | Mod: S$PBB,,, | Performed by: STUDENT IN AN ORGANIZED HEALTH CARE EDUCATION/TRAINING PROGRAM

## 2023-02-23 NOTE — PROGRESS NOTES
CC: right shoulder pain    42 y.o. Female presents today for evaluation of her right shoulder pain. Pt reports she started having shoulder pain in November; pt states that she was playing badminton in the backyard with her daughter and felt a pull. Pt reports she then was walking dog a few days later, and the dog jerked her shoulder. Pt was prescribed meloxicam; pt states she took it for 1 week, but did not receive relief. Pt localizes pain to lateral upper arm and global shoulder with referred pain into lateral neck and forearm. Pt states her pain has worsened over the past few days. Pt reports her pain is 3/10 today at rest and can get up to 8/10 with activity. Pt denies mechanical symptoms. Pt denies numbness/tingling, but notes shooting pain intermittently down right arm.     Pt states she prev had Tenex done on L shoulder for calcific tendinitis, which provided relief.     Hand dominance: Right     SYMPTOMS:   Pain Score: 3/10 at rest, 8/10 with activity   Pain location: lateral upper arm, global shoulder; referred pain to lateral neck and forearm  Time of onset: Nov '22  Trauma, injury: badminton, walking dog    Nocturnal pain: yes  Weakness: yes, as of yesterday  Clicking, catching: none  Neck problems: lateral neck pain    INTERVENTIONS:   Medications tried: meloxicam (1 week, no relief)  Physical therapy: none; did fPT for L shoulder last Fall   Injections: none    RELEVANT HISTORY:   Imaging to date: XR 11/14/22, MRI 02/13/23  Previous significant shoulder/arm injuries: none  Previous shoulder surgeries: none    Occupation: desk job     REVIEW OF SYSTEMS:   Constitution: Patient denies fever or chills.  Eyes: Patient denies eye pain or vision changes.  HEENT: Patient denies ear pain, sore throat, or nasal discharge.  CVS: Patient denies chest pain.  Lungs: Patient denies shortness of breath or cough.  Abdomen: Patient denies any stomach pain, nausea, vomiting, or diarrhea  Skin: Patient denies skin rash or  itching.    Musculoskeletal: Patient denies recent injuries or trauma.  Neuro: Patient denies any numbness or tingling in upper or lower extremities.  Psych: Patient denies any current anxiety or nervousness.    PAST MEDICAL HISTORY:   Past Medical History:   Diagnosis Date    Abnormal Pap smear of cervix     Abnormal Pap smear of vagina     hx LEEP    Primary hypothyroidism 06/09/2014    TPO +    Thyroid nodule 6/9/2014       PAST SURGICAL HISTORY:  Past Surgical History:   Procedure Laterality Date    CERVICAL BIOPSY  W/ LOOP ELECTRODE EXCISION  2006    ECTOPIC PREGNANCY SURGERY  2009    right salpingostomy    TENOTOMY OF SINGLE TENDON OF SHOULDER REGION Left 10/7/2022    Procedure: TENOTOMY, SHOULDER REGION, 1 TENDON;  Surgeon: Phill Fried MD;  Location: [facility];  Service: Orthopedics;  Laterality: Left;    TONSILLECTOMY         FAMILY HISTORY:  Family History   Problem Relation Age of Onset    Breast cancer Paternal Grandmother 58        inflammatory    Breast cancer Mother 44        unilat, s/p, chemo, unk if TNBC, no germline testing    Other Father         born with 1 kidney    Scoliosis Daughter         tested negative for Charcot-Esther-Tooth    Scoliosis Son     Charcot-Esther-Tooth disease Son         from his father    Skin cancer Maternal Aunt         pt believes, ?type, nose (lived most of life in [place]_    No Known Problems Daughter     No Known Problems Other     No Known Problems Other     No Known Problems Other     Colon cancer Neg Hx     Ovarian cancer Neg Hx     Allergic rhinitis Neg Hx     Allergies Neg Hx     Angioedema Neg Hx     Asthma Neg Hx     Atopy Neg Hx     Eczema Neg Hx     Immunodeficiency Neg Hx     Rhinitis Neg Hx     Urticaria Neg Hx     Cancer Neg Hx        SOCIAL HISTORY:  Social History     Socioeconomic History    Marital status: Single   Occupational History    Occupation: billing     Employer: [employer]     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    Drug use: Never    Sexual activity: Yes     Partners: Male     Birth control/protection: None   Other Topics Concern    Are you pregnant or think you may be? No    Breast-feeding No   Social History Narrative    Single and has three children.  One daughter and one son has Scoliosis- no other problems.       MEDICATIONS:     Current Outpatient Medications:     doxycycline (VIBRA-TABS) 100 MG tablet, Take 100 mg by mouth once daily., Disp: , Rfl:     gentamicin (GARAMYCIN) 0.3 % ophthalmic solution, Place 1 drop into both eyes every 4 (four) hours. for 15 days, Disp: 10 mL, Rfl: 1    ID NOW COVID-19 TEST KIT Kit, TEST AS DIRECTED TODAY, Disp: , Rfl:     ivermectin (SOOLANTRA) 1 % Crea, Apply 1 ampule topically every evening., Disp: 30 g, Rfl: 3    LIDOcaine (LIDODERM) 5 %, Place 1 patch onto the skin once daily. Remove & Discard patch within 12 hours or as directed by MD, Disp: 15 patch, Rfl: 2    metroNIDAZOLE (METROGEL) 0.75 % gel, Apply topically once daily., Disp: 45 g, Rfl: 5    metronidazole 1% (METROGEL) 1 % Gel, Apply topically once daily., Disp: 60 g, Rfl: 0    RETIN-A 0.025 % cream, , Disp: , Rfl:     TIROSINT 13 mcg Cap, TAKE 1 CAPSULE BY MOUTH EVERY DAY WITH THE 50 MCG, Disp: 30 capsule, Rfl: 6    TIROSINT 50 mcg Cap, TAKE 1 CAPSULE BY MOUTH EVERY DAY, Disp: 30 capsule, Rfl: 6    traMADoL (ULTRAM) 50 mg tablet, Take 1 tablet (50 mg total) by mouth every 24 hours as needed for Pain., Disp: 15 tablet, Rfl: 0    ALLERGIES:   Review of patient's allergies indicates:  No Known Allergies     IMAGIN. MRI ordered due to right shoulder pain, taken on 23.  2. MRI images were reviewed personally by me and then directly with patient.  3. FINDINGS: There is mild  hypertrophic degenerative osteoarthritis of the right acromioclavicular joint.  No acromioclavicular joint effusion.  No abnormal widening of the acromioclavicular joint to suggest an acromioclavicular joint sprain.  There is subacromial/subdeltoid bursal fluid present.  Please correlate clinically for symptoms of subacromial/subdeltoid bursitis.  There is a flat type I acromion present.  There is a 9 mm AP diameter high-grade partial-thickness bursal surface tear (60-70%) of the supraspinatus tendon at its attachment on the greater tuberosity (series 5, image 18).  No definite full-thickness rotator cuff tendon tear appreciated.  The subscapularis tendon and biceps tendon are normal in appearance.  There is mildly prominent fluid surrounding the biceps tendon within the bicipital groove, nonspecific.  Please correlate clinically for symptoms of biceps tendonitis.  The infraspinatus tendon and teres minor tendon are unremarkable.     No discrete labral tear appreciated on this non-arthrographic study.  No paralabral cyst formation.  There is minimal thickening of the inferior joint capsule with adjacent edema like signal observed in the axillary recess on series 4, image 8, nonspecific.  Please correlate for symptoms of adhesive capsulitis.  The rotator cuff musculature shows no evidence of denervation edema, fatty atrophy, or intramuscular mass.  No shoulder joint effusion.     The visualized osseous structures show no evidence of fracture, dislocation, or pathologic marrow replacement process.    4. IMPRESSION: 1. 9 mm AP diameter high-grade partial-thickness bursal surface insertional tear of the supraspinatus tendon at its attachment on the greater tuberosity.  2. Mild hypertrophic degenerative osteoarthritis of the right acromioclavicular joint and mild subacromial/subdeltoid bursitis.  3. Minimal thickening of the inferior joint capsule with adjacent edema like signal on series 4, image 7-8), nonspecific.   Please correlate for symptoms of adhesive capsulitis.    IMAGIN. Shoulder X-ray ordered due to right shoulder pain. 3 views taken 22.   2. X-ray images were reviewed personally by me and then directly with patient.  3. FINDINGS: No acute fracture or traumatic malalignment.  There is dextrocurvature of the upper thoracic spine which may be positional.  Preserved joint spaces.  Unremarkable soft tissues.    4. IMPRESSION:  See above.    PHYSICAL EXAMINATION:  /74   Ht 5' (1.524 m)   Wt 52.2 kg (115 lb)   LMP 2023 (Exact Date)   BMI 22.46 kg/m²   Vitals signs and nursing note have been reviewed.    General: In no acute distress, well developed, well nourished, no diaphoresis  Eyes: EOM full and smooth, no eye redness or discharge  HEENT: normocephalic and atraumatic, neck supple, trachea midline, no nasal discharge  Cardiovascular: no LE edema  Lungs: respirations non-labored, no conversational dyspnea   Neuro: AAOx3, CN2-12 grossly intact  Skin: No rashes, warm and dry  Psychiatric: cooperative, pleasant, mood and affect appropriate for age    Right Shoulder:  INSPECTION / PALPATION:  -Deformity   -Ecchymosis   -Atrophy   -Sulcus sign   -Global TTP    ROM:    Flex to 60° vs 180° contra   Abduct to 45° vs 160° contra   Int rot to waistline vs T7 contra   Ext rot to 45° vs 60° contra  -Scapular dyskinesis     STRENGTH:   Scaption 3+/5   Flexion 3+/5   Ext rot 4/5   Int rot 4/5    OTHER:   +Painful arc   +Drop arm   -Int lag  -Ext lag  +Nieto-Drake   +Hague active compression   +Empty Can  +Full Can  -Speed's   -Apprehension    NECK:   Grossly FROM & painless in neck flex, ext, B/L torsion, B/L lat flexion   -Spurling B/L    Hands NVI B/L    ASSESSMENT:      ICD-10-CM ICD-9-CM   1. Chronic right shoulder pain  M25.511 719.41    G89.29 338.29   2. Incomplete tear of right rotator cuff, unspecified whether traumatic  M75.111 840.4   3. Chronic shoulder bursitis, right  M75.51 726.10          PLAN:  Given the pathology to the patient's shoulder her progression of decreased range of motion and strength, patient be referred to Orthopedic Sports surgery for surgical consultation.    Future planning includes - referral to Orthopedic Sports surgery    All questions were answered to the best of my ability and all concerns were addressed at this time.    Follow up for above, or sooner if needed.      This note is dictated using the M*Modal Fluency Direct word recognition program. There are word recognition mistakes that are occasionally missed on review.

## 2023-02-26 NOTE — PROGRESS NOTES
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 Please make sure you are supplementing with selenium and vit d ane we will discuss other changes if any at your visit

## 2023-02-28 ENCOUNTER — OFFICE VISIT (OUTPATIENT)
Dept: SPORTS MEDICINE | Facility: CLINIC | Age: 43
End: 2023-02-28
Payer: MEDICAID

## 2023-02-28 VITALS
WEIGHT: 118.06 LBS | BODY MASS INDEX: 23.18 KG/M2 | HEART RATE: 81 BPM | HEIGHT: 60 IN | DIASTOLIC BLOOD PRESSURE: 74 MMHG | SYSTOLIC BLOOD PRESSURE: 117 MMHG

## 2023-02-28 DIAGNOSIS — M75.01 ADHESIVE CAPSULITIS OF RIGHT SHOULDER: Primary | ICD-10-CM

## 2023-02-28 DIAGNOSIS — S46.011A TRAUMATIC INCOMPLETE TEAR OF RIGHT ROTATOR CUFF, INITIAL ENCOUNTER: ICD-10-CM

## 2023-02-28 PROCEDURE — 3074F PR MOST RECENT SYSTOLIC BLOOD PRESSURE < 130 MM HG: ICD-10-PCS | Mod: CPTII,,, | Performed by: STUDENT IN AN ORGANIZED HEALTH CARE EDUCATION/TRAINING PROGRAM

## 2023-02-28 PROCEDURE — 3008F PR BODY MASS INDEX (BMI) DOCUMENTED: ICD-10-PCS | Mod: CPTII,,, | Performed by: STUDENT IN AN ORGANIZED HEALTH CARE EDUCATION/TRAINING PROGRAM

## 2023-02-28 PROCEDURE — 99999 PR PBB SHADOW E&M-EST. PATIENT-LVL III: CPT | Mod: PBBFAC,,, | Performed by: STUDENT IN AN ORGANIZED HEALTH CARE EDUCATION/TRAINING PROGRAM

## 2023-02-28 PROCEDURE — 1159F MED LIST DOCD IN RCRD: CPT | Mod: CPTII,,, | Performed by: STUDENT IN AN ORGANIZED HEALTH CARE EDUCATION/TRAINING PROGRAM

## 2023-02-28 PROCEDURE — 3078F PR MOST RECENT DIASTOLIC BLOOD PRESSURE < 80 MM HG: ICD-10-PCS | Mod: CPTII,,, | Performed by: STUDENT IN AN ORGANIZED HEALTH CARE EDUCATION/TRAINING PROGRAM

## 2023-02-28 PROCEDURE — 1160F PR REVIEW ALL MEDS BY PRESCRIBER/CLIN PHARMACIST DOCUMENTED: ICD-10-PCS | Mod: CPTII,,, | Performed by: STUDENT IN AN ORGANIZED HEALTH CARE EDUCATION/TRAINING PROGRAM

## 2023-02-28 PROCEDURE — 3078F DIAST BP <80 MM HG: CPT | Mod: CPTII,,, | Performed by: STUDENT IN AN ORGANIZED HEALTH CARE EDUCATION/TRAINING PROGRAM

## 2023-02-28 PROCEDURE — 1159F PR MEDICATION LIST DOCUMENTED IN MEDICAL RECORD: ICD-10-PCS | Mod: CPTII,,, | Performed by: STUDENT IN AN ORGANIZED HEALTH CARE EDUCATION/TRAINING PROGRAM

## 2023-02-28 PROCEDURE — 1160F RVW MEDS BY RX/DR IN RCRD: CPT | Mod: CPTII,,, | Performed by: STUDENT IN AN ORGANIZED HEALTH CARE EDUCATION/TRAINING PROGRAM

## 2023-02-28 PROCEDURE — 99214 PR OFFICE/OUTPT VISIT, EST, LEVL IV, 30-39 MIN: ICD-10-PCS | Mod: S$PBB,,, | Performed by: STUDENT IN AN ORGANIZED HEALTH CARE EDUCATION/TRAINING PROGRAM

## 2023-02-28 PROCEDURE — 99214 OFFICE O/P EST MOD 30 MIN: CPT | Mod: S$PBB,,, | Performed by: STUDENT IN AN ORGANIZED HEALTH CARE EDUCATION/TRAINING PROGRAM

## 2023-02-28 PROCEDURE — 99999 PR PBB SHADOW E&M-EST. PATIENT-LVL III: ICD-10-PCS | Mod: PBBFAC,,, | Performed by: STUDENT IN AN ORGANIZED HEALTH CARE EDUCATION/TRAINING PROGRAM

## 2023-02-28 PROCEDURE — 3074F SYST BP LT 130 MM HG: CPT | Mod: CPTII,,, | Performed by: STUDENT IN AN ORGANIZED HEALTH CARE EDUCATION/TRAINING PROGRAM

## 2023-02-28 PROCEDURE — 99213 OFFICE O/P EST LOW 20 MIN: CPT | Mod: PBBFAC,PN | Performed by: STUDENT IN AN ORGANIZED HEALTH CARE EDUCATION/TRAINING PROGRAM

## 2023-02-28 PROCEDURE — 3008F BODY MASS INDEX DOCD: CPT | Mod: CPTII,,, | Performed by: STUDENT IN AN ORGANIZED HEALTH CARE EDUCATION/TRAINING PROGRAM

## 2023-02-28 RX ORDER — METHYLPREDNISOLONE 4 MG/1
TABLET ORAL
Qty: 21 EACH | Refills: 0 | Status: SHIPPED | OUTPATIENT
Start: 2023-02-28 | End: 2023-03-21

## 2023-02-28 NOTE — PROGRESS NOTES
Subjective:          Chief Complaint: Juan Ramirez is a 42 y.o. female who had concerns including Pain of the Right Shoulder.    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 that presents for evaluation for her right shoulder. She is referred by Dr. Morales. She comes today with a completed MRI. She states that her pain started about 3 months ago after playing badminton and having her right arm jerked while walking her dog. She rates her pain as a 8/10 at worst primarily over the lateral aspect of the right shoulder. She denies any instability. She notes that her motions h has been decreasing recently.  The pain has also been increasing.  She is difficulty performing basic activities of daily living due to her limited motion or pain.  Additionally, she is pain at night making it difficult to sleep.  Overall, she has about 30% of her baseline shoulder function.    Of note, she is a history of Hashimoto's thyroiditis.    Dominant Hand: Right    Occupation:  Financial employee [employer]    SSV: 30%    Night Pain? yes    Interfere with ADLs? yes      Past Medical History:   Diagnosis Date    Abnormal Pap smear of cervix     Abnormal Pap smear of vagina     hx LEEP    Primary hypothyroidism 06/09/2014    TPO +    Thyroid nodule 6/9/2014       Current Outpatient Medications on File Prior to Visit   Medication Sig Dispense Refill    doxycycline (VIBRA-TABS) 100 MG tablet Take 100 mg by mouth once daily.      gentamicin (GARAMYCIN) 0.3 % ophthalmic solution Place 1 drop into both eyes every 4 (four) hours. for 15 days 10 mL 1    ID NOW COVID-19 TEST KIT Kit TEST AS DIRECTED TODAY      ivermectin (SOOLANTRA) 1 % Crea Apply 1 ampule topically every evening. 30 g 3    LIDOcaine (LIDODERM) 5 % Place 1 patch onto the skin once daily. Remove & Discard patch within 12 hours or as directed by MD 15 patch 2    metroNIDAZOLE (METROGEL) 0.75 % gel Apply topically  once daily. 45 g 5    metronidazole 1% (METROGEL) 1 % Gel Apply topically once daily. 60 g 0    RETIN-A 0.025 % cream       TIROSINT 13 mcg Cap TAKE 1 CAPSULE BY MOUTH EVERY DAY WITH THE 50 MCG 30 capsule 6    TIROSINT 50 mcg Cap TAKE 1 CAPSULE BY MOUTH EVERY DAY 30 capsule 6    traMADoL (ULTRAM) 50 mg tablet Take 1 tablet (50 mg total) by mouth every 24 hours as needed for Pain. (Patient not taking: Reported on 2/28/2023) 15 tablet 0     No current facility-administered medications on file prior to visit.       Past Surgical History:   Procedure Laterality Date    CERVICAL BIOPSY  W/ LOOP ELECTRODE EXCISION  2006    ECTOPIC PREGNANCY SURGERY  2009    right salpingostomy    TENOTOMY OF SINGLE TENDON OF SHOULDER REGION Left 10/7/2022    Procedure: TENOTOMY, SHOULDER REGION, 1 TENDON;  Surgeon: Phill Fried MD;  Location: [facility] OR;  Service: Orthopedics;  Laterality: Left;    TONSILLECTOMY         Family History   Problem Relation Age of Onset    Breast cancer Paternal Grandmother 58        inflammatory    Breast cancer Mother 44        unilat, s/p, chemo, unk if TNBC, no germline testing    Other Father         born with 1 kidney    Scoliosis Daughter         tested negative for Charcot-Esther-Tooth    Scoliosis Son     Charcot-Esther-Tooth disease Son         from his father    Skin cancer Maternal Aunt         pt believes, ?type, nose (lived most of life in [place]_    No Known Problems Daughter     No Known Problems Other     No Known Problems Other     No Known Problems Other     Colon cancer Neg Hx     Ovarian cancer Neg Hx     Allergic rhinitis Neg Hx     Allergies Neg Hx     Angioedema Neg Hx     Asthma Neg Hx     Atopy Neg Hx     Eczema Neg Hx     Immunodeficiency Neg Hx     Rhinitis Neg Hx     Urticaria Neg Hx     Cancer Neg Hx        Social History     Socioeconomic History    Marital status: Single   Occupational History    Occupation: billing     Employer: [employer]     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    Drug use: Never    Sexual activity: Yes     Partners: Male     Birth control/protection: None   Other Topics Concern    Are you pregnant or think you may be? No    Breast-feeding No   Social History Narrative    Single and has three children.  One daughter and one son has Scoliosis- no other problems.       Review of Systems   Constitutional: Negative.   HENT: Negative.     Eyes: Negative.    Cardiovascular: Negative.    Respiratory: Negative.     Endocrine: Negative.    Hematologic/Lymphatic: Negative.    Skin: Negative.    Musculoskeletal:  Positive for joint pain (right shoulder), muscle weakness and stiffness. Negative for arthritis, falls, gout and neck pain.   Neurological: Negative.    Psychiatric/Behavioral: Negative.     Allergic/Immunologic: Negative.                  Objective:        General: Juan is well-developed, well-nourished, appears stated age, in no acute distress, alert and oriented to time, place and person.     General    Nursing note and vitals reviewed.  Constitutional: She is oriented to person, place, and time. She appears well-developed and well-nourished. No distress.   HENT:   Head: Normocephalic and atraumatic.   Nose: Nose normal.   Eyes: EOM are normal.   Cardiovascular:  Intact distal pulses.            Pulmonary/Chest: Effort normal. No respiratory distress.   Neurological: She is alert and oriented to person, place, and time.   Psychiatric: She has a normal mood and affect. Her behavior is normal. Judgment and thought content normal.         Back (L-Spine & T-Spine) / Neck (C-Spine) Exam     Tenderness   The patient is tender to palpation of the right  trapezial.   Right Shoulder Exam     Inspection/Observation   Swelling: absent  Bruising: absent  Scars: absent  Deformity: absent  Scapular Winging: absent  Scapular Dyskinesia: negative  Atrophy: absent    Tenderness   The patient is tender to palpation of the greater tuberosity.    Range of Motion   Active abduction:  90   Passive abduction:  90   Forward Flexion:  90   Forward Elevation: 90  External Rotation 0 degrees:  20   Internal rotation 0 degrees:  Sacrum     Tests & Signs   Cross arm: positive  Nieto test: positive  Impingement: positive  Rotator Cuff Painful Arc/Range: severe    Other   Sensation: normal    Comments:  Active motion and passive motion are equal both severely limited    Left Shoulder Exam   Left shoulder exam is normal.    Inspection/Observation   Swelling: absent  Bruising: absent  Scars: absent  Deformity: absent  Scapular Winging: absent  Scapular Dyskinesia: negative  Atrophy: absent    Tenderness   The patient is experiencing no tenderness.     Range of Motion   Active abduction:  170   Passive abduction:  170   Forward Flexion:  180   Forward Elevation: 180  External Rotation 0 degrees:  60   Internal rotation 0 degrees:  Mid thoracic     Other   Sensation: normal       Muscle Strength   Right Upper Extremity   Shoulder Abduction: 4/5   Shoulder Internal Rotation: 4/5   Shoulder External Rotation: 4/5   Supraspinatus: 4/5   Subscapularis: 4/5   Biceps: 4/5   Left Upper Extremity  Shoulder Abduction: 5/5   Shoulder Internal Rotation: 5/5   Shoulder External Rotation: 5/5   Supraspinatus: 5/5   Subscapularis: 5/5   Biceps: 5/5     Vascular Exam     Right Pulses      Radial:                    2+      Left Pulses      Radial:                    2+      Capillary Refill  Right Hand: normal capillary refill  Left Hand: normal capillary refill          Imaging:   X-rays of the right shoulder from 11/14/2022 personally viewed by me 02/28/2023.  The glenohumeral joint is reduced.  No bony  abnormality.      MRI of the right shoulder from 02/13/2023 personally viewed by me 02/20/2023.  High-grade partial, near full-thickness, tear of the supraspinatus tendon just proximal to its insertion on the footprint.  There was thickening of the joint capsule consistent with adhesive capsulitis.  Fluid around the long tendon of the biceps.      Assessment:     Juan Ramirez is a 42 y.o. female with right adhesive capsulitis and traumatic high-grade partial thickness tear of the supraspinatus  Encounter Diagnoses   Name Primary?    Adhesive capsulitis of right shoulder Yes    Traumatic incomplete tear of right rotator cuff, initial encounter           Plan:      The diagnosis and treatment options with the patient all her questions were answered I showed her the x-rays and the MRI and discussed the findings with her.  We discussed treatment options for high-grade partial rotator cuff tears with concurrent adhesive capsulitis.  I stated I typically like to begin with non operative management of this.  My recommendation is a corticosteroid injection for pain control as well as the curb the inflammation in the joint capsule as well as help with pain control.  This is accompanied with physical therapy to work on aggressive active and passive motion and stretching only.  I would then re-evaluate her in about 6 weeks to gauge her progress.  If she was not seeing significant improvement in her motion we would consider surgical intervention which would include shoulder arthroscopy with lysis of adhesions, manipulation under anesthesia, a thorough examination of the rotator cuff with repair if needed.  After this discussion, she is interested in physical therapy but she is not interested in the corticosteroid injection.  I stated that I do recommended to postop inflammation pain control, but she is still not interested.  Rather, I will prescribe her a Medrol Dosepak.  She will return to clinic in 6 weeks for repeat  evaluation.    All of their questions were answered.  They will call the clinic with any questions or concerns in the interim.    Should the patient's symptoms worsen, persist, or fail to improve they should return for reevaluation and I would be happy to see them back anytime.        Gurwinder Orta M.D.    Please be aware that this note has been generated with the assistance of [employer] voice-to-text.  Please excuse any spelling or grammatical errors.    Thank you for choosing Dr. Gurwinder Orta for your sports medicine care. It is our goal to provide you with exceptional care that will help keep you healthy, active, and get you back in the game.     If you felt that you received exemplary care today, please consider leaving feedback for Dr. Orta on [employer] at [email or web address].    Please do not hesitate to reach out to us via email, phone, or MyChart with any questions, concerns, or feedback.

## 2023-03-02 ENCOUNTER — PATIENT MESSAGE (OUTPATIENT)
Dept: SPORTS MEDICINE | Facility: CLINIC | Age: 43
End: 2023-03-02
Payer: MEDICAID

## 2023-03-09 ENCOUNTER — PATIENT MESSAGE (OUTPATIENT)
Dept: SPORTS MEDICINE | Facility: CLINIC | Age: 43
End: 2023-03-09
Payer: MEDICAID

## 2023-03-17 ENCOUNTER — PATIENT MESSAGE (OUTPATIENT)
Dept: SPORTS MEDICINE | Facility: CLINIC | Age: 43
End: 2023-03-17
Payer: MEDICAID

## 2023-03-24 ENCOUNTER — OFFICE VISIT (OUTPATIENT)
Dept: SPORTS MEDICINE | Facility: CLINIC | Age: 43
End: 2023-03-24
Payer: MEDICAID

## 2023-03-24 DIAGNOSIS — G89.29 CHRONIC RIGHT SHOULDER PAIN: Primary | ICD-10-CM

## 2023-03-24 DIAGNOSIS — M75.01 ADHESIVE CAPSULITIS OF RIGHT SHOULDER: ICD-10-CM

## 2023-03-24 DIAGNOSIS — M25.511 CHRONIC RIGHT SHOULDER PAIN: Primary | ICD-10-CM

## 2023-03-24 PROCEDURE — 1159F MED LIST DOCD IN RCRD: CPT | Mod: CPTII,95,, | Performed by: STUDENT IN AN ORGANIZED HEALTH CARE EDUCATION/TRAINING PROGRAM

## 2023-03-24 PROCEDURE — 1160F RVW MEDS BY RX/DR IN RCRD: CPT | Mod: CPTII,95,, | Performed by: STUDENT IN AN ORGANIZED HEALTH CARE EDUCATION/TRAINING PROGRAM

## 2023-03-24 PROCEDURE — 1160F PR REVIEW ALL MEDS BY PRESCRIBER/CLIN PHARMACIST DOCUMENTED: ICD-10-PCS | Mod: CPTII,95,, | Performed by: STUDENT IN AN ORGANIZED HEALTH CARE EDUCATION/TRAINING PROGRAM

## 2023-03-24 PROCEDURE — 1159F PR MEDICATION LIST DOCUMENTED IN MEDICAL RECORD: ICD-10-PCS | Mod: CPTII,95,, | Performed by: STUDENT IN AN ORGANIZED HEALTH CARE EDUCATION/TRAINING PROGRAM

## 2023-03-24 PROCEDURE — 99214 PR OFFICE/OUTPT VISIT, EST, LEVL IV, 30-39 MIN: ICD-10-PCS | Mod: 95,,, | Performed by: STUDENT IN AN ORGANIZED HEALTH CARE EDUCATION/TRAINING PROGRAM

## 2023-03-24 PROCEDURE — 99214 OFFICE O/P EST MOD 30 MIN: CPT | Mod: 95,,, | Performed by: STUDENT IN AN ORGANIZED HEALTH CARE EDUCATION/TRAINING PROGRAM

## 2023-03-24 NOTE — PROGRESS NOTES
Telemedicine/Virtual Visit Documentation:     The patient location is: home    The chief complaint leading to consultation is: see HPI    VISIT TYPE X   Virtual visit with synchronous audio and video X   Telephone E/M service      Total time spent with patient: see X jerrica on chart below.   More than half of the time was spent counseling or coordinating care including prognosis, differential diagnosis, risks and benefits of treatment, instructions, compliance risk reductions     EST MINUTES X   [id number] 5    [id number] 10    05845 15    13485 25 X   [id number] 40    NEW     57060 10    27919 20    08669 30    54011 45    15919 60    PHONE      5-10    65929 11-20    43898 21-30      H&P  Orthopaedics      SUBJECTIVE:     History of Present Illness:  Patient is a 42 y.o. female following up for consultation of PRP procedure for adhesive capsulitis of the right shoulder.  Was evaluated by Dr. Orta and he would like to proceed with some type of intra-articular injection to address the adhesive capsulitis prior to addressing the rotator cuff tear.    Patient referred to my clinic by Dr. Orta.    Review of patient's allergies indicates:  No Known Allergies    Past Medical History:   Diagnosis Date    Abnormal Pap smear of cervix     Abnormal Pap smear of vagina     hx LEEP    Primary hypothyroidism 06/09/2014    TPO +    Thyroid nodule 6/9/2014     Past Surgical History:   Procedure Laterality Date    CERVICAL BIOPSY  W/ LOOP ELECTRODE EXCISION  2006    ECTOPIC PREGNANCY SURGERY  2009    right salpingostomy    TENOTOMY OF SINGLE TENDON OF SHOULDER REGION Left 10/7/2022    Procedure: TENOTOMY, SHOULDER REGION, 1 TENDON;  Surgeon: Phill Fried MD;  Location: [facility] OR;  Service: Orthopedics;  Laterality: Left;    TONSILLECTOMY       Family History   Problem Relation Age of Onset    Breast cancer Paternal Grandmother 58        inflammatory    Breast cancer Mother 44        unilat, s/p, chemo, unk if TNBC, no germline  testing    Other Father         born with 1 kidney    Scoliosis Daughter         tested negative for Charcot-Esther-Tooth    Scoliosis Son     Charcot-Esther-Tooth disease Son         from his father    Skin cancer Maternal Aunt         pt believes, ?type, nose (lived most of life in [place]_    No Known Problems Daughter     No Known Problems Other     No Known Problems Other     No Known Problems Other     Colon cancer Neg Hx     Ovarian cancer Neg Hx     Allergic rhinitis Neg Hx     Allergies Neg Hx     Angioedema Neg Hx     Asthma Neg Hx     Atopy Neg Hx     Eczema Neg Hx     Immunodeficiency Neg Hx     Rhinitis Neg Hx     Urticaria Neg Hx     Cancer Neg Hx      Social History     Tobacco Use    Smoking status: Former     Types: Cigarettes     Quit date: 3/3/1995     Years since quittin.0    Smokeless tobacco: Never   Substance Use Topics    Alcohol use: Never     Alcohol/week: 0.0 standard drinks     Comment: Occ.    Drug use: Never        Review of Systems:  Patient denies constitutional symptoms, cardiac symptoms, respiratory symptoms, GI symptoms.  The remainder of the musculoskeletal ROS is included in the HPI.      OBJECTIVE:     Physical Exam:  Physical exam limited as this was a virtual visit, but it is apparent that the individual is in no acute distress, well groomed, well kept.  Speech is normal.  Patient is alert and oriented x3.  Mood and affect appear normal.       ASSESSMENT/PLAN:     A/P: Juan Ramirez is a 42 y.o. with right shoulder adhesive capsulitis and rotator cuff tear.  Will move for with ACP injection right glenohumeral joint.  Patient given information on PRP.  Patient instructed to stop anti-inflammatories for 10 days prior to procedure.    Plan:  - right glenohumeral joint PRP-ACP  injection

## 2023-03-30 ENCOUNTER — PROCEDURE VISIT (OUTPATIENT)
Dept: SPORTS MEDICINE | Facility: CLINIC | Age: 43
End: 2023-03-30
Payer: MEDICAID

## 2023-03-30 VITALS
BODY MASS INDEX: 23.07 KG/M2 | WEIGHT: 117.5 LBS | HEART RATE: 73 BPM | SYSTOLIC BLOOD PRESSURE: 96 MMHG | DIASTOLIC BLOOD PRESSURE: 69 MMHG | HEIGHT: 60 IN

## 2023-03-30 DIAGNOSIS — M75.01 ADHESIVE CAPSULITIS OF RIGHT SHOULDER: ICD-10-CM

## 2023-03-30 DIAGNOSIS — G89.18 OTHER ACUTE POSTPROCEDURAL PAIN: ICD-10-CM

## 2023-03-30 DIAGNOSIS — M25.511 CHRONIC RIGHT SHOULDER PAIN: Primary | ICD-10-CM

## 2023-03-30 DIAGNOSIS — G89.29 CHRONIC RIGHT SHOULDER PAIN: Primary | ICD-10-CM

## 2023-03-30 PROCEDURE — 99499 UNLISTED E&M SERVICE: CPT | Mod: CSM,,, | Performed by: STUDENT IN AN ORGANIZED HEALTH CARE EDUCATION/TRAINING PROGRAM

## 2023-03-30 PROCEDURE — 0232T PR INJECT PLATELET PLASMA W/IMG HARVEST/PREPARATOIN: ICD-10-PCS | Mod: CSM,,, | Performed by: STUDENT IN AN ORGANIZED HEALTH CARE EDUCATION/TRAINING PROGRAM

## 2023-03-30 PROCEDURE — 0232T NJX PLATELET PLASMA: CPT | Mod: PBBFAC | Performed by: STUDENT IN AN ORGANIZED HEALTH CARE EDUCATION/TRAINING PROGRAM

## 2023-03-30 PROCEDURE — 0232T NJX PLATELET PLASMA: CPT | Mod: CSM,,, | Performed by: STUDENT IN AN ORGANIZED HEALTH CARE EDUCATION/TRAINING PROGRAM

## 2023-03-30 PROCEDURE — 99499 NO LOS: ICD-10-PCS | Mod: CSM,,, | Performed by: STUDENT IN AN ORGANIZED HEALTH CARE EDUCATION/TRAINING PROGRAM

## 2023-03-30 RX ORDER — TRAMADOL HYDROCHLORIDE 50 MG/1
50 TABLET ORAL EVERY 6 HOURS PRN
Qty: 28 TABLET | Refills: 0 | Status: SHIPPED | OUTPATIENT
Start: 2023-03-30 | End: 2023-04-06

## 2023-03-30 NOTE — PROCEDURES
Subjective:     Juan Ramirez     Chief Complaint   Patient presents with    Right Shoulder - Pain       Juan is a 42 y.o. female coming in today for an Autologous Conditioned Plasma (ACP-PRP) injection. Written consent obtained prior to procedure.     Objective:     VITAL SIGNS: BP 96/69   Pulse 73   Ht 5' (1.524 m)   Wt 53.3 kg (117 lb 8 oz)   BMI 22.95 kg/m²      PRE-PROCEDURE DIAGNOSIS:   1. Chronic right shoulder pain  Sports Medicine US - Guidance for Needle Placement      2. Adhesive capsulitis of right shoulder  Sports Medicine US - Guidance for Needle Placement          PROCEDURE TYPE: ACP injection to the right glenohumeral joint under ultrasound guidance  .  RISKS, BENEFITS, ADVERSE EFFECTS:  We reviewed that this is a medical procedure involving first the harvesting of some of patients own blood. This tissue will be minimally manipulated and prepared for injection; and finally the tissue will be injected into damaged ligaments, tendons, muscle and/or joints, for the purpose of strengthening and healing and/or lessening pain from the damaged areas. The diagnosis, the nature of the treatment, and the theoretical basis of regenerative medicine has been explained. The patient was given the opportunity to ask any questions concerning the specific procedure. We further reviewed that in many medical circles this is considered experimental and/or investigational. It is not standard of care in many medical communities. It is also not covered under insurance. We further reviewed this treatment is part of a treatment program, and all parts of the program are essential to a successful outcome, including but not limited to post-injection physical therapy at home and/or in office. As part of a treatment program, this procedure treatment may involve several injections given at each treatment session AND may require multiple treatment sessions. We reviewed that, while this is inherently a safe procedure, there are  "risks involved. The specific risks depend on the tissue being aspirated and the reinjection site of treatment.     RISKS INCLUDE, but not be limited to:   COMMON:   -Soreness and/or moderately increased pain, with bruising for several days after each treatment  -typically decreased range of motion from what the patient might feel on most days   -Improvement in pain and function may take 4-12 weeks, occasionally longer in some cases   RARE:    - Patient expressed understanding that risks can include, but not be limited to, Infection, worsening pain, headache, bleeding (bruising or hematoma) allergic reaction (itching, rash, shortness of breath, seizures, and even death), and other rare risks can be present due to the nature of the location of the procedure including temporary or permanent nerve damage.     POTENTIAL BENEFITS include: reduction or elimination of pain and greater function. We also reviewed alternative or conventional methods of treatment, and the probability that the proposed treatment will or will not be successful. No guarantee or assurance has been made to me relative to results that may be expected from these techniques. Furthermore, patient understands that he/she/they may withdraw from treatment at any time.     Written consent obtained from patient prior to performing procedure.     PROCEDURE DETAILS:     Sterile technique was used to phlebotomize 15 mL of the patients blood from a peripheral vein. This blood was  into density-divided layers using the ArthSynerchip ACP Centrifuge. The layer of ACP, a volume of 5.5 mL, was placed into a sterile syringe in preparation for injection.    Timeout: Prior to procedure the correct patient, procedure, and site was verified     Joint: The tip of the 18 gauge 3.5" needle was used to enter the joint capsule. 5.5 mL of autologous conditioned plasma was injected in total.       The procedure was tolerated well. Immobilization of the tendon was obtained " and was instructed to be maintained for 12-24 hours. .     DISCHARGE CONDITION: The patient tolerated the procedure well and there were no immediate complications. The patient was instructed to call the clinic immediately for any mild to moderate adverse side effects, or to call 911 in the event of an emergency.    Complications: none     Estimated blood loss from injection procedure: none     Joint aspirate volume: 0 mL    POST PROCEDURE INSTRUCTIONS: Post-procedure care  Following the procedure, a sterile bandage was placed over the injection site.     Description of ultrasound utilization for needle guidance:  Ultrasound guidance was used for needle localization with SonModeWalk Edge 2, C1-5 MHz probe(s). Images were saved and stored for documentation. The glenohumeral joint was visualized. Dynamic visualization of the needle(s)/probe was continuous throughout the procedure and maintained good position and correct needle placement.        Assessment:      Encounter Diagnoses   Name Primary?    Chronic right shoulder pain Yes    Adhesive capsulitis of right shoulder           Plan:     Autologous Condition Plasma injection procedure performed today for right glenohumeral joint adhesive capsulitis (see details above).  Take home instructions handout given to patient.   - Recommend Extra-strength Tylenol (Acetaminophen 500mg) 2 tables every 4-6 hours, but not to exceed 3000mg (6 pills) in a day.  If pain is not controlled by exercise and Tylenol, patient instructed to take tramadol 50 mg Q 6 hr p.r.n. for pain control instead.   - recommend avoiding NSAIDs for at least 21 days, but ideally throughout the entire treatment period    2. Follow-up in 6 weeks for reevaluation    3. Patient agreeable to today's plan and all questions were answered    This note is dictated using the M*Modal Fluency Direct word recognition program. There are word recognition mistakes that are occasionally missed on review.

## 2023-03-31 ENCOUNTER — PATIENT MESSAGE (OUTPATIENT)
Dept: SPORTS MEDICINE | Facility: CLINIC | Age: 43
End: 2023-03-31
Payer: MEDICAID

## 2023-04-06 ENCOUNTER — PATIENT MESSAGE (OUTPATIENT)
Dept: RHEUMATOLOGY | Facility: CLINIC | Age: 43
End: 2023-04-06
Payer: MEDICAID

## 2023-04-07 ENCOUNTER — PATIENT MESSAGE (OUTPATIENT)
Dept: SPORTS MEDICINE | Facility: CLINIC | Age: 43
End: 2023-04-07
Payer: MEDICAID

## 2023-04-21 ENCOUNTER — PATIENT MESSAGE (OUTPATIENT)
Dept: OBSTETRICS AND GYNECOLOGY | Facility: CLINIC | Age: 43
End: 2023-04-21
Payer: MEDICAID

## 2023-04-21 ENCOUNTER — LAB VISIT (OUTPATIENT)
Dept: LAB | Facility: HOSPITAL | Age: 43
End: 2023-04-21
Attending: OBSTETRICS & GYNECOLOGY
Payer: MEDICAID

## 2023-04-21 DIAGNOSIS — N39.0 URINARY TRACT INFECTION WITHOUT HEMATURIA, SITE UNSPECIFIED: ICD-10-CM

## 2023-04-21 DIAGNOSIS — N39.0 URINARY TRACT INFECTION WITHOUT HEMATURIA, SITE UNSPECIFIED: Primary | ICD-10-CM

## 2023-04-21 PROCEDURE — 87086 URINE CULTURE/COLONY COUNT: CPT | Performed by: OBSTETRICS & GYNECOLOGY

## 2023-04-23 LAB
BACTERIA UR CULT: NORMAL
BACTERIA UR CULT: NORMAL

## 2023-06-17 ENCOUNTER — LAB VISIT (OUTPATIENT)
Dept: LAB | Facility: HOSPITAL | Age: 43
End: 2023-06-17
Attending: INTERNAL MEDICINE
Payer: MEDICAID

## 2023-06-17 DIAGNOSIS — E03.8 HYPOTHYROIDISM DUE TO HASHIMOTO'S THYROIDITIS: ICD-10-CM

## 2023-06-17 DIAGNOSIS — E06.3 HYPOTHYROIDISM DUE TO HASHIMOTO'S THYROIDITIS: ICD-10-CM

## 2023-06-17 LAB
T4 FREE SERPL-MCNC: 1.05 NG/DL (ref 0.71–1.51)
TSH SERPL DL<=0.005 MIU/L-ACNC: 4.38 UIU/ML (ref 0.4–4)

## 2023-06-17 PROCEDURE — 36415 COLL VENOUS BLD VENIPUNCTURE: CPT | Mod: PO | Performed by: INTERNAL MEDICINE

## 2023-06-17 PROCEDURE — 84439 ASSAY OF FREE THYROXINE: CPT | Performed by: INTERNAL MEDICINE

## 2023-06-17 PROCEDURE — 84443 ASSAY THYROID STIM HORMONE: CPT | Performed by: INTERNAL MEDICINE

## 2023-06-19 ENCOUNTER — OFFICE VISIT (OUTPATIENT)
Dept: ENDOCRINOLOGY | Facility: CLINIC | Age: 43
End: 2023-06-19
Payer: MEDICAID

## 2023-06-19 VITALS
HEIGHT: 62 IN | BODY MASS INDEX: 20.96 KG/M2 | SYSTOLIC BLOOD PRESSURE: 100 MMHG | DIASTOLIC BLOOD PRESSURE: 62 MMHG | WEIGHT: 113.88 LBS | HEART RATE: 66 BPM | OXYGEN SATURATION: 98 %

## 2023-06-19 DIAGNOSIS — R93.89 ABNORMAL THYROID ULTRASOUND: ICD-10-CM

## 2023-06-19 DIAGNOSIS — E03.8 HYPOTHYROIDISM DUE TO HASHIMOTO'S THYROIDITIS: Primary | ICD-10-CM

## 2023-06-19 DIAGNOSIS — E06.3 HYPOTHYROIDISM DUE TO HASHIMOTO'S THYROIDITIS: Primary | ICD-10-CM

## 2023-06-19 PROCEDURE — 3008F PR BODY MASS INDEX (BMI) DOCUMENTED: ICD-10-PCS | Mod: CPTII,,, | Performed by: INTERNAL MEDICINE

## 2023-06-19 PROCEDURE — 99999 PR PBB SHADOW E&M-EST. PATIENT-LVL III: ICD-10-PCS | Mod: PBBFAC,,, | Performed by: INTERNAL MEDICINE

## 2023-06-19 PROCEDURE — 3008F BODY MASS INDEX DOCD: CPT | Mod: CPTII,,, | Performed by: INTERNAL MEDICINE

## 2023-06-19 PROCEDURE — 99214 OFFICE O/P EST MOD 30 MIN: CPT | Mod: S$PBB,,, | Performed by: INTERNAL MEDICINE

## 2023-06-19 PROCEDURE — 99999 PR PBB SHADOW E&M-EST. PATIENT-LVL III: CPT | Mod: PBBFAC,,, | Performed by: INTERNAL MEDICINE

## 2023-06-19 PROCEDURE — 1160F RVW MEDS BY RX/DR IN RCRD: CPT | Mod: CPTII,,, | Performed by: INTERNAL MEDICINE

## 2023-06-19 PROCEDURE — 99214 PR OFFICE/OUTPT VISIT, EST, LEVL IV, 30-39 MIN: ICD-10-PCS | Mod: S$PBB,,, | Performed by: INTERNAL MEDICINE

## 2023-06-19 PROCEDURE — 1160F PR REVIEW ALL MEDS BY PRESCRIBER/CLIN PHARMACIST DOCUMENTED: ICD-10-PCS | Mod: CPTII,,, | Performed by: INTERNAL MEDICINE

## 2023-06-19 PROCEDURE — 99213 OFFICE O/P EST LOW 20 MIN: CPT | Mod: PBBFAC,PO | Performed by: INTERNAL MEDICINE

## 2023-06-19 PROCEDURE — 3078F DIAST BP <80 MM HG: CPT | Mod: CPTII,,, | Performed by: INTERNAL MEDICINE

## 2023-06-19 PROCEDURE — 3074F PR MOST RECENT SYSTOLIC BLOOD PRESSURE < 130 MM HG: ICD-10-PCS | Mod: CPTII,,, | Performed by: INTERNAL MEDICINE

## 2023-06-19 PROCEDURE — 3078F PR MOST RECENT DIASTOLIC BLOOD PRESSURE < 80 MM HG: ICD-10-PCS | Mod: CPTII,,, | Performed by: INTERNAL MEDICINE

## 2023-06-19 PROCEDURE — 1159F PR MEDICATION LIST DOCUMENTED IN MEDICAL RECORD: ICD-10-PCS | Mod: CPTII,,, | Performed by: INTERNAL MEDICINE

## 2023-06-19 PROCEDURE — 3074F SYST BP LT 130 MM HG: CPT | Mod: CPTII,,, | Performed by: INTERNAL MEDICINE

## 2023-06-19 PROCEDURE — 1159F MED LIST DOCD IN RCRD: CPT | Mod: CPTII,,, | Performed by: INTERNAL MEDICINE

## 2023-06-19 NOTE — PROGRESS NOTES
CHIEF COMPLAINT: Hypothyroidism  42 y.o.  being seen as a f/u. Hypothyroidism diagnosed 2012. Was on armour 60 mg daily. Now on Tirosint 50 mcg daily. Was on 13 mg tablet as well. Stopped due to palpitations and was having insomnia. After labs restarted. Still planning on getting pregnant. No diarrhea or constipation. No CP/SOB. Has been under more stress.           PAST MEDICAL HISTORY/PAST SURGICAL HISTORY:  Reviewed in [facility]     SOCIAL HISTORY: Quit smoking 1995. No alcohol.      FAMILY HISTORY:  + Breast Ca. No known with thyroid disease.      MEDICATIONS/ALLERGIES: The patient's MedCard has been updated and reviewed.          PE:    GENERAL: Well developed, well nourished.  NECK: Supple, trachea midline, no palpable thyroid nodules  CHEST: Resp even and unlabored, CTA bilateral.  CARDIAC: RRR, S1, S2 heard, no murmurs, rubs, S3, or S4           Latest Reference Range & Units 06/17/23 09:07   TSH 0.400 - 4.000 uIU/mL 4.375 (H)   Free T4 0.71 - 1.51 ng/dL 1.05   (H): Data is abnormally high          ASSESSMENT/PLAN:  1. Hypothyroidism- TPO +-  does not need to have TPO Ab repeated. Not recommended to follow TPO levels. Use of T3 in pregnancy not recommended. Since planning on getting pregnant will keep TSH < 2.5.  Patient restarted Tirosint 13 mcg on top of her 50 mcg once she saw the labs.  Agree with that change.  Check TSH in 6 weeks.  Discussed not recommended to monitor thyroid antibody testing.  Only needs TSH to titrate dosing of medication    2. Heterogeneous thyroid ultrasound-patient had questions about previous ultrasound that was done.  Previous ultrasound showed a heterogeneous gland which is expected to be seen with underlying autoimmune thyroid disease.  Does not need to be monitored.  Will have thyroid checked with physical exam annually.  If anything palpable on exam, can do ultrasound    3.  Patient had several questions about different types of hormonal tests such as testing for adrenal  fatigue and other hormones.  Discussed the difference between evidence based medicine and what is often times done in integrative/alternative medicine practices.  Discussed we do not testing for adrenal fatigue and not a recognized diseased based on Endocrine society guidelines.  Discussed the cortisol testing due to stress is not recommended test.  Cortisol testing done for Cushing's disease and adrenal insufficiency       FOLLOWUP  6 weeks- TSH  F/U 1 year with TSH

## 2023-08-04 ENCOUNTER — PATIENT MESSAGE (OUTPATIENT)
Dept: ENDOCRINOLOGY | Facility: CLINIC | Age: 43
End: 2023-08-04
Payer: MEDICAID

## 2023-08-04 ENCOUNTER — PATIENT MESSAGE (OUTPATIENT)
Dept: SPORTS MEDICINE | Facility: CLINIC | Age: 43
End: 2023-08-04
Payer: MEDICAID

## 2023-08-07 ENCOUNTER — LAB VISIT (OUTPATIENT)
Dept: LAB | Facility: HOSPITAL | Age: 43
End: 2023-08-07
Attending: INTERNAL MEDICINE
Payer: MEDICAID

## 2023-08-07 ENCOUNTER — OFFICE VISIT (OUTPATIENT)
Dept: FAMILY MEDICINE | Facility: CLINIC | Age: 43
End: 2023-08-07
Payer: MEDICAID

## 2023-08-07 VITALS
BODY MASS INDEX: 20.99 KG/M2 | SYSTOLIC BLOOD PRESSURE: 124 MMHG | WEIGHT: 114.06 LBS | DIASTOLIC BLOOD PRESSURE: 70 MMHG | HEIGHT: 62 IN | HEART RATE: 62 BPM | OXYGEN SATURATION: 99 %

## 2023-08-07 DIAGNOSIS — Z00.01 ANNUAL VISIT FOR GENERAL ADULT MEDICAL EXAMINATION WITH ABNORMAL FINDINGS: ICD-10-CM

## 2023-08-07 DIAGNOSIS — L71.9 ROSACEA: ICD-10-CM

## 2023-08-07 DIAGNOSIS — G43.829 MENSTRUAL MIGRAINE WITHOUT STATUS MIGRAINOSUS, NOT INTRACTABLE: ICD-10-CM

## 2023-08-07 DIAGNOSIS — E03.8 HYPOTHYROIDISM DUE TO HASHIMOTO'S THYROIDITIS: ICD-10-CM

## 2023-08-07 DIAGNOSIS — E78.49 OTHER HYPERLIPIDEMIA: ICD-10-CM

## 2023-08-07 DIAGNOSIS — E06.3 HYPOTHYROIDISM DUE TO HASHIMOTO'S THYROIDITIS: ICD-10-CM

## 2023-08-07 DIAGNOSIS — E06.3 HYPOTHYROIDISM DUE TO HASHIMOTO'S THYROIDITIS: Primary | ICD-10-CM

## 2023-08-07 DIAGNOSIS — E03.8 HYPOTHYROIDISM DUE TO HASHIMOTO'S THYROIDITIS: Primary | ICD-10-CM

## 2023-08-07 LAB
ALBUMIN SERPL BCP-MCNC: 3.8 G/DL (ref 3.5–5.2)
ALP SERPL-CCNC: 49 U/L (ref 55–135)
ALT SERPL W/O P-5'-P-CCNC: 10 U/L (ref 10–44)
ANION GAP SERPL CALC-SCNC: 8 MMOL/L (ref 8–16)
AST SERPL-CCNC: 16 U/L (ref 10–40)
BASOPHILS # BLD AUTO: 0.07 K/UL (ref 0–0.2)
BASOPHILS NFR BLD: 1.1 % (ref 0–1.9)
BILIRUB SERPL-MCNC: 0.5 MG/DL (ref 0.1–1)
BUN SERPL-MCNC: 10 MG/DL (ref 6–20)
CALCIUM SERPL-MCNC: 9.2 MG/DL (ref 8.7–10.5)
CHLORIDE SERPL-SCNC: 106 MMOL/L (ref 95–110)
CHOLEST SERPL-MCNC: 217 MG/DL (ref 120–199)
CHOLEST/HDLC SERPL: 3.5 {RATIO} (ref 2–5)
CO2 SERPL-SCNC: 27 MMOL/L (ref 23–29)
CREAT SERPL-MCNC: 0.7 MG/DL (ref 0.5–1.4)
DIFFERENTIAL METHOD: ABNORMAL
EOSINOPHIL # BLD AUTO: 0.1 K/UL (ref 0–0.5)
EOSINOPHIL NFR BLD: 1.8 % (ref 0–8)
ERYTHROCYTE [DISTWIDTH] IN BLOOD BY AUTOMATED COUNT: 12.4 % (ref 11.5–14.5)
EST. GFR  (NO RACE VARIABLE): >60 ML/MIN/1.73 M^2
GLUCOSE SERPL-MCNC: 75 MG/DL (ref 70–110)
HCT VFR BLD AUTO: 40.8 % (ref 37–48.5)
HDLC SERPL-MCNC: 62 MG/DL (ref 40–75)
HDLC SERPL: 28.6 % (ref 20–50)
HGB BLD-MCNC: 13.6 G/DL (ref 12–16)
IMM GRANULOCYTES # BLD AUTO: 0.02 K/UL (ref 0–0.04)
IMM GRANULOCYTES NFR BLD AUTO: 0.3 % (ref 0–0.5)
LDLC SERPL CALC-MCNC: 147 MG/DL (ref 63–159)
LYMPHOCYTES # BLD AUTO: 2.2 K/UL (ref 1–4.8)
LYMPHOCYTES NFR BLD: 36.3 % (ref 18–48)
MCH RBC QN AUTO: 31.3 PG (ref 27–31)
MCHC RBC AUTO-ENTMCNC: 33.3 G/DL (ref 32–36)
MCV RBC AUTO: 94 FL (ref 82–98)
MONOCYTES # BLD AUTO: 0.4 K/UL (ref 0.3–1)
MONOCYTES NFR BLD: 6.4 % (ref 4–15)
NEUTROPHILS # BLD AUTO: 3.3 K/UL (ref 1.8–7.7)
NEUTROPHILS NFR BLD: 54.1 % (ref 38–73)
NONHDLC SERPL-MCNC: 155 MG/DL
NRBC BLD-RTO: 0 /100 WBC
PLATELET # BLD AUTO: 257 K/UL (ref 150–450)
PMV BLD AUTO: 11.3 FL (ref 9.2–12.9)
POTASSIUM SERPL-SCNC: 4.1 MMOL/L (ref 3.5–5.1)
PROT SERPL-MCNC: 7.1 G/DL (ref 6–8.4)
RBC # BLD AUTO: 4.35 M/UL (ref 4–5.4)
SODIUM SERPL-SCNC: 141 MMOL/L (ref 136–145)
TRIGL SERPL-MCNC: 40 MG/DL (ref 30–150)
TSH SERPL DL<=0.005 MIU/L-ACNC: 2.49 UIU/ML (ref 0.4–4)
WBC # BLD AUTO: 6.12 K/UL (ref 3.9–12.7)

## 2023-08-07 PROCEDURE — 36415 COLL VENOUS BLD VENIPUNCTURE: CPT | Mod: PO | Performed by: INTERNAL MEDICINE

## 2023-08-07 PROCEDURE — 1160F RVW MEDS BY RX/DR IN RCRD: CPT | Mod: CPTII,,, | Performed by: FAMILY MEDICINE

## 2023-08-07 PROCEDURE — 3008F BODY MASS INDEX DOCD: CPT | Mod: CPTII,,, | Performed by: FAMILY MEDICINE

## 2023-08-07 PROCEDURE — 1159F PR MEDICATION LIST DOCUMENTED IN MEDICAL RECORD: ICD-10-PCS | Mod: CPTII,,, | Performed by: FAMILY MEDICINE

## 2023-08-07 PROCEDURE — 3078F DIAST BP <80 MM HG: CPT | Mod: CPTII,,, | Performed by: FAMILY MEDICINE

## 2023-08-07 PROCEDURE — 99214 OFFICE O/P EST MOD 30 MIN: CPT | Mod: S$PBB,,, | Performed by: FAMILY MEDICINE

## 2023-08-07 PROCEDURE — 99214 PR OFFICE/OUTPT VISIT, EST, LEVL IV, 30-39 MIN: ICD-10-PCS | Mod: S$PBB,,, | Performed by: FAMILY MEDICINE

## 2023-08-07 PROCEDURE — 1160F PR REVIEW ALL MEDS BY PRESCRIBER/CLIN PHARMACIST DOCUMENTED: ICD-10-PCS | Mod: CPTII,,, | Performed by: FAMILY MEDICINE

## 2023-08-07 PROCEDURE — 80061 LIPID PANEL: CPT | Performed by: FAMILY MEDICINE

## 2023-08-07 PROCEDURE — 85025 COMPLETE CBC W/AUTO DIFF WBC: CPT | Performed by: FAMILY MEDICINE

## 2023-08-07 PROCEDURE — 3008F PR BODY MASS INDEX (BMI) DOCUMENTED: ICD-10-PCS | Mod: CPTII,,, | Performed by: FAMILY MEDICINE

## 2023-08-07 PROCEDURE — 3074F SYST BP LT 130 MM HG: CPT | Mod: CPTII,,, | Performed by: FAMILY MEDICINE

## 2023-08-07 PROCEDURE — 84443 ASSAY THYROID STIM HORMONE: CPT | Performed by: INTERNAL MEDICINE

## 2023-08-07 PROCEDURE — 99213 OFFICE O/P EST LOW 20 MIN: CPT | Mod: PBBFAC,PO | Performed by: FAMILY MEDICINE

## 2023-08-07 PROCEDURE — 99999 PR PBB SHADOW E&M-EST. PATIENT-LVL III: ICD-10-PCS | Mod: PBBFAC,,, | Performed by: FAMILY MEDICINE

## 2023-08-07 PROCEDURE — 3078F PR MOST RECENT DIASTOLIC BLOOD PRESSURE < 80 MM HG: ICD-10-PCS | Mod: CPTII,,, | Performed by: FAMILY MEDICINE

## 2023-08-07 PROCEDURE — 99999 PR PBB SHADOW E&M-EST. PATIENT-LVL III: CPT | Mod: PBBFAC,,, | Performed by: FAMILY MEDICINE

## 2023-08-07 PROCEDURE — 3074F PR MOST RECENT SYSTOLIC BLOOD PRESSURE < 130 MM HG: ICD-10-PCS | Mod: CPTII,,, | Performed by: FAMILY MEDICINE

## 2023-08-07 PROCEDURE — 1159F MED LIST DOCD IN RCRD: CPT | Mod: CPTII,,, | Performed by: FAMILY MEDICINE

## 2023-08-07 PROCEDURE — 80053 COMPREHEN METABOLIC PANEL: CPT | Performed by: FAMILY MEDICINE

## 2023-08-07 RX ORDER — LEVOTHYROXINE SODIUM 50 UG/1
CAPSULE ORAL
Qty: 30 CAPSULE | Refills: 6 | Status: SHIPPED | OUTPATIENT
Start: 2023-08-07

## 2023-08-07 NOTE — PROGRESS NOTES
Assessment:       1. Hypothyroidism due to Hashimoto's thyroiditis    2. Other hyperlipidemia    3. Rosacea      4.  Menstrual migraine without status migrainosus not intractable  Plan:       Hypothyroidism due to Hashimoto's thyroiditis:  Uncontrolled  -     CBC Auto Differential; Future; Expected date: 08/07/2023    Other hyperlipidemia:  Uncontrolled  -     CBC Auto Differential; Future; Expected date: 08/07/2023  -     Comprehensive Metabolic Panel; Future; Expected date: 08/07/2023  -     Lipid Panel; Future; Expected date: 08/07/2023    Rosacea:  Improved       Migraines:  New problem workup needed    Will check hormonal levels before the patient is having her menstrual cycle secondary to worsening headaches 3 days prior to the cycle.  The patient will follow-up with the gynecologist.  The symptoms get worse, the patient notify us immediately.  Continue with current medications as directed.   Patient agreed with assessment and plan. Patient verbalized understanding.     Subjective:       Patient ID: Juan Ramirez is a 42 y.o. female.    Chief Complaint: Annual Exam    HPI    Hashimoto's:  The patient currently seen the endocrinologist and rheumatologist secondary to hypothyroidism from Hashimoto's.  The patient TSH levels were slightly elevated, the patient is currently not taking any thyroid medication.  The patient stated that she has been trying to cut back on the gluten intake to see said improvement will be in the next few months.    Migraines:  The patient has been having for the last 6 months pain on the left side of the head that happened 3 days before the menstrual cycle and do not go away until her cycle starts.  The patient stated that sometimes wake up with a headache.  By the headaches go away and usually every 28 days.    Hyperlipidemia:  The last cholesterol levels were slightly elevated, the LDL levels were more than 100, HDL levels were in good range, total cholesterol was slightly elevated.   She also has rosacea, using Metrogel and also Retin-A.    Past medical history, past social history was reviewed and discussed with the patient.    Review of Systems   Constitutional:  Negative for activity change, appetite change and chills.   HENT:  Negative for congestion and ear discharge.    Eyes:  Negative for discharge and itching.   Respiratory:  Negative for choking and chest tightness.    Cardiovascular:  Negative for chest pain, palpitations and leg swelling.   Gastrointestinal:  Negative for abdominal distention, abdominal pain and constipation.   Endocrine: Negative for cold intolerance and heat intolerance.   Genitourinary:  Negative for dysuria and flank pain.   Musculoskeletal:  Negative for arthralgias and back pain.   Skin:  Negative for pallor and rash.   Allergic/Immunologic: Negative for environmental allergies and food allergies.   Neurological:  Positive for headaches. Negative for dizziness and facial asymmetry.   Hematological:  Negative for adenopathy. Does not bruise/bleed easily.   Psychiatric/Behavioral:  Negative for agitation, confusion, decreased concentration and sleep disturbance.        Objective:      Physical Exam  Vitals and nursing note reviewed.   Constitutional:       General: She is not in acute distress.     Appearance: Normal appearance. She is well-developed and normal weight. She is not diaphoretic.   HENT:      Head: Normocephalic and atraumatic.      Right Ear: External ear normal.      Left Ear: External ear normal.      Nose: Nose normal.      Mouth/Throat:      Pharynx: No oropharyngeal exudate.   Eyes:      General: No scleral icterus.        Right eye: No discharge.         Left eye: No discharge.      Conjunctiva/sclera: Conjunctivae normal.      Pupils: Pupils are equal, round, and reactive to light.   Cardiovascular:      Rate and Rhythm: Normal rate and regular rhythm.      Heart sounds: Normal heart sounds.   Pulmonary:      Effort: Pulmonary effort is  normal. No respiratory distress.      Breath sounds: Normal breath sounds. No wheezing.   Abdominal:      General: Abdomen is flat. Bowel sounds are normal. There is no distension.      Palpations: There is no mass.      Tenderness: There is no abdominal tenderness.   Musculoskeletal:         General: No tenderness or deformity.      Cervical back: Neck supple.   Skin:     Coloration: Skin is not pale.      Findings: No erythema.   Neurological:      Mental Status: She is alert.      Cranial Nerves: No cranial nerve deficit.      Coordination: Coordination normal.   Psychiatric:         Behavior: Behavior normal.         Thought Content: Thought content normal.         Judgment: Judgment normal.

## 2023-08-08 ENCOUNTER — HOSPITAL ENCOUNTER (OUTPATIENT)
Dept: RADIOLOGY | Facility: HOSPITAL | Age: 43
Discharge: HOME | End: 2023-08-08
Attending: OBSTETRICS & GYNECOLOGY
Payer: MEDICAID

## 2023-08-08 DIAGNOSIS — Z12.31 BREAST CANCER SCREENING BY MAMMOGRAM: ICD-10-CM

## 2023-08-08 DIAGNOSIS — Z12.39 SCREENING BREAST EXAMINATION: ICD-10-CM

## 2023-08-08 PROCEDURE — 77067 SCR MAMMO BI INCL CAD: CPT | Mod: 26,,, | Performed by: RADIOLOGY

## 2023-08-08 PROCEDURE — 77063 MAMMO DIGITAL SCREENING BILAT WITH TOMO: ICD-10-PCS | Mod: 26,,, | Performed by: RADIOLOGY

## 2023-08-08 PROCEDURE — 77067 MAMMO DIGITAL SCREENING BILAT WITH TOMO: ICD-10-PCS | Mod: 26,,, | Performed by: RADIOLOGY

## 2023-08-08 PROCEDURE — 77067 SCR MAMMO BI INCL CAD: CPT | Mod: TC,PO

## 2023-08-08 PROCEDURE — 77063 BREAST TOMOSYNTHESIS BI: CPT | Mod: 26,,, | Performed by: RADIOLOGY

## 2023-08-23 ENCOUNTER — PATIENT MESSAGE (OUTPATIENT)
Dept: FAMILY MEDICINE | Facility: CLINIC | Age: 43
End: 2023-08-23
Payer: MEDICAID

## 2023-08-24 ENCOUNTER — LAB VISIT (OUTPATIENT)
Dept: LAB | Facility: HOSPITAL | Age: 43
End: 2023-08-24
Attending: FAMILY MEDICINE
Payer: MEDICAID

## 2023-08-24 DIAGNOSIS — G43.829 MENSTRUAL MIGRAINE WITHOUT STATUS MIGRAINOSUS, NOT INTRACTABLE: ICD-10-CM

## 2023-08-24 LAB
ESTRADIOL SERPL-MCNC: 101 PG/ML
FSH SERPL-ACNC: 2.17 MIU/ML
PROGEST SERPL-MCNC: 9.2 NG/ML
TESTOST SERPL-MCNC: 30 NG/DL (ref 5–73)

## 2023-08-24 PROCEDURE — 82670 ASSAY OF TOTAL ESTRADIOL: CPT | Performed by: FAMILY MEDICINE

## 2023-08-24 PROCEDURE — 83001 ASSAY OF GONADOTROPIN (FSH): CPT | Performed by: FAMILY MEDICINE

## 2023-08-24 PROCEDURE — 84144 ASSAY OF PROGESTERONE: CPT | Performed by: FAMILY MEDICINE

## 2023-08-24 PROCEDURE — 84403 ASSAY OF TOTAL TESTOSTERONE: CPT | Performed by: FAMILY MEDICINE

## 2023-08-24 PROCEDURE — 36415 COLL VENOUS BLD VENIPUNCTURE: CPT | Mod: PO | Performed by: FAMILY MEDICINE

## 2023-08-31 ENCOUNTER — OFFICE VISIT (OUTPATIENT)
Dept: SPORTS MEDICINE | Facility: CLINIC | Age: 43
End: 2023-08-31
Payer: MEDICAID

## 2023-08-31 VITALS
DIASTOLIC BLOOD PRESSURE: 71 MMHG | HEART RATE: 67 BPM | HEIGHT: 62 IN | BODY MASS INDEX: 21.1 KG/M2 | WEIGHT: 114.63 LBS | SYSTOLIC BLOOD PRESSURE: 106 MMHG

## 2023-08-31 DIAGNOSIS — M75.01 ADHESIVE CAPSULITIS OF RIGHT SHOULDER: Primary | ICD-10-CM

## 2023-08-31 DIAGNOSIS — S46.011A TRAUMATIC INCOMPLETE TEAR OF RIGHT ROTATOR CUFF, INITIAL ENCOUNTER: ICD-10-CM

## 2023-08-31 PROCEDURE — 99999 PR PBB SHADOW E&M-EST. PATIENT-LVL III: ICD-10-PCS | Mod: PBBFAC,,, | Performed by: STUDENT IN AN ORGANIZED HEALTH CARE EDUCATION/TRAINING PROGRAM

## 2023-08-31 PROCEDURE — 1160F PR REVIEW ALL MEDS BY PRESCRIBER/CLIN PHARMACIST DOCUMENTED: ICD-10-PCS | Mod: CPTII,,, | Performed by: STUDENT IN AN ORGANIZED HEALTH CARE EDUCATION/TRAINING PROGRAM

## 2023-08-31 PROCEDURE — 3008F PR BODY MASS INDEX (BMI) DOCUMENTED: ICD-10-PCS | Mod: CPTII,,, | Performed by: STUDENT IN AN ORGANIZED HEALTH CARE EDUCATION/TRAINING PROGRAM

## 2023-08-31 PROCEDURE — 1160F RVW MEDS BY RX/DR IN RCRD: CPT | Mod: CPTII,,, | Performed by: STUDENT IN AN ORGANIZED HEALTH CARE EDUCATION/TRAINING PROGRAM

## 2023-08-31 PROCEDURE — 99213 OFFICE O/P EST LOW 20 MIN: CPT | Mod: PBBFAC | Performed by: STUDENT IN AN ORGANIZED HEALTH CARE EDUCATION/TRAINING PROGRAM

## 2023-08-31 PROCEDURE — 1159F PR MEDICATION LIST DOCUMENTED IN MEDICAL RECORD: ICD-10-PCS | Mod: CPTII,,, | Performed by: STUDENT IN AN ORGANIZED HEALTH CARE EDUCATION/TRAINING PROGRAM

## 2023-08-31 PROCEDURE — 99213 OFFICE O/P EST LOW 20 MIN: CPT | Mod: S$PBB,,, | Performed by: STUDENT IN AN ORGANIZED HEALTH CARE EDUCATION/TRAINING PROGRAM

## 2023-08-31 PROCEDURE — 1159F MED LIST DOCD IN RCRD: CPT | Mod: CPTII,,, | Performed by: STUDENT IN AN ORGANIZED HEALTH CARE EDUCATION/TRAINING PROGRAM

## 2023-08-31 PROCEDURE — 3078F PR MOST RECENT DIASTOLIC BLOOD PRESSURE < 80 MM HG: ICD-10-PCS | Mod: CPTII,,, | Performed by: STUDENT IN AN ORGANIZED HEALTH CARE EDUCATION/TRAINING PROGRAM

## 2023-08-31 PROCEDURE — 99999 PR PBB SHADOW E&M-EST. PATIENT-LVL III: CPT | Mod: PBBFAC,,, | Performed by: STUDENT IN AN ORGANIZED HEALTH CARE EDUCATION/TRAINING PROGRAM

## 2023-08-31 PROCEDURE — 99213 PR OFFICE/OUTPT VISIT, EST, LEVL III, 20-29 MIN: ICD-10-PCS | Mod: S$PBB,,, | Performed by: STUDENT IN AN ORGANIZED HEALTH CARE EDUCATION/TRAINING PROGRAM

## 2023-08-31 PROCEDURE — 3074F SYST BP LT 130 MM HG: CPT | Mod: CPTII,,, | Performed by: STUDENT IN AN ORGANIZED HEALTH CARE EDUCATION/TRAINING PROGRAM

## 2023-08-31 PROCEDURE — 3074F PR MOST RECENT SYSTOLIC BLOOD PRESSURE < 130 MM HG: ICD-10-PCS | Mod: CPTII,,, | Performed by: STUDENT IN AN ORGANIZED HEALTH CARE EDUCATION/TRAINING PROGRAM

## 2023-08-31 PROCEDURE — 3008F BODY MASS INDEX DOCD: CPT | Mod: CPTII,,, | Performed by: STUDENT IN AN ORGANIZED HEALTH CARE EDUCATION/TRAINING PROGRAM

## 2023-08-31 PROCEDURE — 3078F DIAST BP <80 MM HG: CPT | Mod: CPTII,,, | Performed by: STUDENT IN AN ORGANIZED HEALTH CARE EDUCATION/TRAINING PROGRAM

## 2023-08-31 NOTE — PROGRESS NOTES
Subjective:          Chief Complaint: Juan Ramirez is a 42 y.o. female who had concerns including Follow-up of the Right Shoulder.    CC:  right Shoulder Pain    HPI 8/31/23:  Juan Ramirez is a 42 y.o. female returns for follow up evaluation for her right shoulder pain. She states that she feels much improved.  She had a ACP injection by Dr. Morales which also helped with her symptoms.  She does still have pain and limited motion, however it has slowly been improving.  She rates her pain as mostly pain free at rest with occasional pain with overhead motion. She is attending formal physical therapy at [facility] 2x week working on motion. She denies any increased pain at night or difficulty sleeping. She denies any instability or new injury. She rates her function as 50% of her baseline.       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 She is referred by Dr. Morales. She comes today with a completed MRI. She states that her pain started about 3 months ago after playing badminton and having her right arm jerked while walking her dog. She rates her pain as a 8/10 at worst primarily over the lateral aspect of the right shoulder. She denies any instability. She notes that her motions h has been decreasing recently.  The pain has also been increasing.  She is difficulty performing basic activities of daily living due to her limited motion or pain.  Additionally, she is pain at night making it difficult to sleep.  Overall, she has about 30% of her baseline shoulder function.    Of note, she is a history of Hashimoto's thyroiditis.    Dominant Hand: Right    Occupation:  Financial employee [employer]    SSV: 30%    Night Pain? yes    Interfere with ADLs? yes        Past Medical History:   Diagnosis Date    Abnormal Pap smear of cervix     Abnormal Pap smear of vagina     hx LEEP    Primary hypothyroidism 06/09/2014    TPO +        Current Outpatient Medications on File Prior to Visit   Medication Sig Dispense Refill    metroNIDAZOLE (METROGEL) 0.75 % gel Apply topically once daily. 45 g 5    RETIN-A 0.025 % cream       TIROSINT 13 mcg Cap TAKE 1 CAPSULE BY MOUTH EVERY DAY WITH THE 50 MCG 30 capsule 6    TIROSINT 50 mcg Cap TAKE 1 CAPSULE BY MOUTH EVERY DAY 30 capsule 6     No current facility-administered medications on file prior to visit.       Past Surgical History:   Procedure Laterality Date    CERVICAL BIOPSY  W/ LOOP ELECTRODE EXCISION  2006    ECTOPIC PREGNANCY SURGERY  2009    right salpingostomy    TENOTOMY OF SINGLE TENDON OF SHOULDER REGION Left 10/7/2022    Procedure: TENOTOMY, SHOULDER REGION, 1 TENDON;  Surgeon: Phill Fried MD;  Location: [facility] OR;  Service: Orthopedics;  Laterality: Left;    TONSILLECTOMY         Family History   Problem Relation Age of Onset    Breast cancer Paternal Grandmother 58        inflammatory    Breast cancer Mother 44        unilat, s/p, chemo, unk if TNBC, no germline testing    Other Father         born with 1 kidney    Scoliosis Daughter         tested negative for Charcot-Esther-Tooth    Scoliosis Son     Charcot-Esther-Tooth disease Son         from his father    Skin cancer Maternal Aunt         pt believes, ?type, nose (lived most of life in [place]_    No Known Problems Daughter     No Known Problems Other     No Known Problems Other     No Known Problems Other     Colon cancer Neg Hx     Ovarian cancer Neg Hx     Allergic rhinitis Neg Hx     Allergies Neg Hx     Angioedema Neg Hx     Asthma Neg Hx     Atopy Neg Hx     Eczema Neg Hx     Immunodeficiency Neg Hx     Rhinitis Neg Hx     Urticaria Neg Hx     Cancer Neg Hx        Social History     Socioeconomic History    Marital status: Single   Occupational History    Occupation: billing     Employer: [employer]     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    Drug use: Never    Sexual activity: Yes     Partners: Male     Birth control/protection: None   Other Topics Concern    Are you pregnant or think you may be? No    Breast-feeding No   Social History Narrative    Single and has three children.  One daughter and one son has Scoliosis- no other problems.       Review of Systems   Constitutional: Negative.   HENT: Negative.     Eyes: Negative.    Cardiovascular: Negative.    Respiratory: Negative.     Endocrine: Negative.    Hematologic/Lymphatic: Negative.    Skin: Negative.    Musculoskeletal:  Positive for joint pain (right shoulder), muscle weakness and stiffness. Negative for arthritis, falls, gout and neck pain.   Neurological: Negative.    Psychiatric/Behavioral: Negative.     Allergic/Immunologic: Negative.        Pain Related Questions  Over the past 3 days, what was your average pain during activity? (I.e. running, jogging, walking, climbing stairs, getting dressed, ect.): 3  Over the past 3 days, what was your highest pain level?: 4  Over the past 3 days, what was your lowest pain level? : 2    Other  How many nights a week are you awakened by your affected body part?: 0  Was the patient's HEIGHT measured or patient reported?: Patient Reported  Was the patient's WEIGHT measured or patient reported?: Measured      Objective:        General: Juan is well-developed, well-nourished, appears stated age, in no acute distress, alert and oriented to time, place and person.     General    Nursing note and vitals reviewed.  Constitutional: She is oriented to person, place, and time. She appears well-developed and well-nourished. No distress.   HENT:   Head: Normocephalic and atraumatic.   Nose: Nose normal.   Eyes: EOM are normal.    Cardiovascular:  Intact distal pulses.            Pulmonary/Chest: Effort normal. No respiratory distress.   Neurological: She is alert and oriented to person, place, and time.   Psychiatric: She has a normal mood and affect. Her behavior is normal. Judgment and thought content normal.         Right Shoulder Exam     Inspection/Observation   Swelling: absent  Bruising: absent  Scars: absent  Deformity: absent  Scapular Winging: absent  Scapular Dyskinesia: negative  Atrophy: absent    Tenderness   The patient is tender to palpation of the greater tuberosity.    Range of Motion   Active abduction:  120   Passive abduction:  120   Forward Flexion:  120   Forward Elevation: 120  External Rotation 0 degrees:  30   Internal rotation 0 degrees:  Sacrum     Tests & Signs   Cross arm: positive  Nieto test: positive  Impingement: positive  Rotator Cuff Painful Arc/Range: severe  Active Compression Test ([place]'s Sign): positive    Other   Sensation: normal    Comments:  Active motion and passive motion are equal both limited    Left Shoulder Exam   Left shoulder exam is normal.    Inspection/Observation   Swelling: absent  Bruising: absent  Scars: absent  Deformity: absent  Scapular Winging: absent  Scapular Dyskinesia: negative  Atrophy: absent    Tenderness   The patient is experiencing no tenderness.     Range of Motion   Active abduction:  170   Passive abduction:  170   Forward Flexion:  180   Forward Elevation: 180  External Rotation 0 degrees:  60   Internal rotation 0 degrees:  Mid thoracic     Other   Sensation: normal       Muscle Strength   Right Upper Extremity   Shoulder Abduction: 4/5   Shoulder Internal Rotation: 4/5   Shoulder External Rotation: 4/5   Supraspinatus: 4/5   Subscapularis: 4/5   Biceps: 4/5   Left Upper Extremity  Shoulder Abduction: 5/5   Shoulder Internal Rotation: 5/5   Shoulder External Rotation: 5/5   Supraspinatus: 5/5   Subscapularis: 5/5   Biceps: 5/5     Vascular Exam     Right  Pulses      Radial:                    2+      Left Pulses      Radial:                    2+      Capillary Refill  Right Hand: normal capillary refill  Left Hand: normal capillary refill            Imaging:   X-rays of the right shoulder from 11/14/2022 personally viewed by me 02/28/2023.  The glenohumeral joint is reduced.  No bony abnormality.      MRI of the right shoulder from 02/13/2023 personally viewed by me 02/20/2023.  High-grade partial, near full-thickness, tear of the supraspinatus tendon just proximal to its insertion on the footprint.  There was thickening of the joint capsule consistent with adhesive capsulitis.  Fluid around the long tendon of the biceps.      Assessment:     Juan Ramirez is a 42 y.o. female with right adhesive capsulitis and traumatic high-grade partial thickness tear of the supraspinatus  No diagnosis found.         Plan:      The diagnosis and treatment options were again discussed at length with the patient all her questions were answered.  She is interested in repeat ACP injection.  I will refer her to Dr. Morales for repeat injection.  She will continue physical therapy.  I did inform her that her progress is slow, ideally 6 months of physical therapy and an injection she will result in near complete resolution of symptoms.  We will have her return to clinic 6-8 weeks after the injection for repeat evaluation.    All of their questions were answered.  They will call the clinic with any questions or concerns in the interim.    Should the patient's symptoms worsen, persist, or fail to improve they should return for reevaluation and I would be happy to see them back anytime.        Gurwinder Orta M.D.    Please be aware that this note has been generated with the assistance of [employer] voice-to-text.  Please excuse any spelling or grammatical errors.    Thank you for choosing Dr. Gurwinder Orta for your sports medicine care. It is our goal to provide you with exceptional care that  will help keep you healthy, active, and get you back in the game.     If you felt that you received exemplary care today, please consider leaving feedback for Dr. Orta on [employer] at [email or web address].    Please do not hesitate to reach out to us via email, phone, or MyChart with any questions, concerns, or feedback.

## 2023-09-05 ENCOUNTER — PATIENT MESSAGE (OUTPATIENT)
Dept: ENDOCRINOLOGY | Facility: CLINIC | Age: 43
End: 2023-09-05
Payer: MEDICAID

## 2023-09-05 DIAGNOSIS — E03.8 HYPOTHYROIDISM DUE TO HASHIMOTO'S THYROIDITIS: Primary | ICD-10-CM

## 2023-09-05 DIAGNOSIS — E06.3 HYPOTHYROIDISM DUE TO HASHIMOTO'S THYROIDITIS: Primary | ICD-10-CM

## 2023-09-05 RX ORDER — LEVOTHYROXINE SODIUM 13 UG/1
CAPSULE ORAL
Qty: 30 CAPSULE | Refills: 6 | Status: SHIPPED | OUTPATIENT
Start: 2023-09-05

## 2023-09-11 ENCOUNTER — OFFICE VISIT (OUTPATIENT)
Dept: RHEUMATOLOGY | Facility: CLINIC | Age: 43
End: 2023-09-11
Payer: MEDICAID

## 2023-09-11 VITALS
HEIGHT: 62 IN | DIASTOLIC BLOOD PRESSURE: 75 MMHG | WEIGHT: 115.94 LBS | SYSTOLIC BLOOD PRESSURE: 113 MMHG | HEART RATE: 65 BPM | BODY MASS INDEX: 21.34 KG/M2

## 2023-09-11 DIAGNOSIS — L71.9 ROSACEA: ICD-10-CM

## 2023-09-11 DIAGNOSIS — G89.29 CHRONIC LEFT SHOULDER PAIN: ICD-10-CM

## 2023-09-11 DIAGNOSIS — E06.3 HASHIMOTO'S THYROIDITIS: Primary | ICD-10-CM

## 2023-09-11 DIAGNOSIS — E04.1 THYROID NODULE: ICD-10-CM

## 2023-09-11 DIAGNOSIS — M75.32 CALCIFIC TENDINITIS OF LEFT SHOULDER: ICD-10-CM

## 2023-09-11 DIAGNOSIS — M25.512 CHRONIC LEFT SHOULDER PAIN: ICD-10-CM

## 2023-09-11 PROCEDURE — 1159F MED LIST DOCD IN RCRD: CPT | Mod: CPTII,,, | Performed by: INTERNAL MEDICINE

## 2023-09-11 PROCEDURE — 99213 OFFICE O/P EST LOW 20 MIN: CPT | Mod: PBBFAC,PN | Performed by: INTERNAL MEDICINE

## 2023-09-11 PROCEDURE — 3008F BODY MASS INDEX DOCD: CPT | Mod: CPTII,,, | Performed by: INTERNAL MEDICINE

## 2023-09-11 PROCEDURE — 3078F PR MOST RECENT DIASTOLIC BLOOD PRESSURE < 80 MM HG: ICD-10-PCS | Mod: CPTII,,, | Performed by: INTERNAL MEDICINE

## 2023-09-11 PROCEDURE — 99215 PR OFFICE/OUTPT VISIT, EST, LEVL V, 40-54 MIN: ICD-10-PCS | Mod: S$PBB,,, | Performed by: INTERNAL MEDICINE

## 2023-09-11 PROCEDURE — 3078F DIAST BP <80 MM HG: CPT | Mod: CPTII,,, | Performed by: INTERNAL MEDICINE

## 2023-09-11 PROCEDURE — 99215 OFFICE O/P EST HI 40 MIN: CPT | Mod: S$PBB,,, | Performed by: INTERNAL MEDICINE

## 2023-09-11 PROCEDURE — 99999 PR PBB SHADOW E&M-EST. PATIENT-LVL III: CPT | Mod: PBBFAC,,, | Performed by: INTERNAL MEDICINE

## 2023-09-11 PROCEDURE — 1160F PR REVIEW ALL MEDS BY PRESCRIBER/CLIN PHARMACIST DOCUMENTED: ICD-10-PCS | Mod: CPTII,,, | Performed by: INTERNAL MEDICINE

## 2023-09-11 PROCEDURE — 99999 PR PBB SHADOW E&M-EST. PATIENT-LVL III: ICD-10-PCS | Mod: PBBFAC,,, | Performed by: INTERNAL MEDICINE

## 2023-09-11 PROCEDURE — 3044F PR MOST RECENT HEMOGLOBIN A1C LEVEL <7.0%: ICD-10-PCS | Mod: CPTII,,, | Performed by: INTERNAL MEDICINE

## 2023-09-11 PROCEDURE — 3044F HG A1C LEVEL LT 7.0%: CPT | Mod: CPTII,,, | Performed by: INTERNAL MEDICINE

## 2023-09-11 PROCEDURE — 3074F PR MOST RECENT SYSTOLIC BLOOD PRESSURE < 130 MM HG: ICD-10-PCS | Mod: CPTII,,, | Performed by: INTERNAL MEDICINE

## 2023-09-11 PROCEDURE — 1159F PR MEDICATION LIST DOCUMENTED IN MEDICAL RECORD: ICD-10-PCS | Mod: CPTII,,, | Performed by: INTERNAL MEDICINE

## 2023-09-11 PROCEDURE — 3008F PR BODY MASS INDEX (BMI) DOCUMENTED: ICD-10-PCS | Mod: CPTII,,, | Performed by: INTERNAL MEDICINE

## 2023-09-11 PROCEDURE — 1160F RVW MEDS BY RX/DR IN RCRD: CPT | Mod: CPTII,,, | Performed by: INTERNAL MEDICINE

## 2023-09-11 PROCEDURE — 3074F SYST BP LT 130 MM HG: CPT | Mod: CPTII,,, | Performed by: INTERNAL MEDICINE

## 2023-09-11 ASSESSMENT — ROUTINE ASSESSMENT OF PATIENT INDEX DATA (RAPID3)
TOTAL RAPID3 SCORE: 2.06
PSYCHOLOGICAL DISTRESS SCORE: 0
PATIENT GLOBAL ASSESSMENT SCORE: 2.5
FATIGUE SCORE: 0
MDHAQ FUNCTION SCORE: 0.2
PAIN SCORE: 3

## 2023-09-11 NOTE — PROGRESS NOTES
Subjective:       Patient ID: Juan Ramirez is a 42 y.o. female.    Chief Complaint: Disease Management    Follow up:43 yo female hashmoto's thyroiditis and lumbar sacral pain she had what looks malar rash four years ago she resolved  but cleaning up diet.  She had an elevation of TBG first time she  took serrapeptase x 1 month then DC. She had PRP R shoulder. she started Doing fair, she had an episode of erythem,on her breast and back, May 1, 2021,  she is doing PRP injection R shoulder.    Review of Systems   Constitutional:  Positive for activity change. Negative for appetite change, chills, diaphoresis, fatigue, fever and unexpected weight change.   HENT:  Negative for congestion, dental problem, ear discharge, ear pain, facial swelling, mouth sores, nosebleeds, postnasal drip, rhinorrhea, sinus pressure, sneezing, sore throat, tinnitus, trouble swallowing and voice change.    Eyes:  Negative for photophobia, pain, discharge, redness and itching.   Respiratory:  Negative for apnea, cough, chest tightness, shortness of breath and wheezing.    Cardiovascular:  Negative for chest pain, palpitations and leg swelling.   Gastrointestinal:  Negative for abdominal distention, abdominal pain, constipation, diarrhea, nausea and vomiting.   Endocrine: Negative for cold intolerance, heat intolerance, polydipsia and polyuria.   Genitourinary:  Negative for decreased urine volume, difficulty urinating, dysuria, flank pain, frequency, hematuria and urgency.   Musculoskeletal:  Negative for arthralgias, back pain, gait problem, joint swelling, myalgias, neck pain and neck stiffness.   Skin:  Negative for pallor, rash and wound.   Allergic/Immunologic: Negative for immunocompromised state.   Neurological:  Negative for dizziness, tremors, weakness, numbness and headaches.   Hematological:  Negative for adenopathy. Does not bruise/bleed easily.   Psychiatric/Behavioral:  Negative for sleep disturbance. The patient is not  "nervous/anxious.          Objective:   /75   Pulse 65   Ht 5' 2.01" (1.575 m)   Wt 52.6 kg (115 lb 15.4 oz)   BMI 21.20 kg/m²      Physical Exam   Constitutional: She is oriented to person, place, and time. No distress.   HENT:   Head: Normocephalic and atraumatic.   Eyes: Pupils are equal, round, and reactive to light. Right eye exhibits no discharge. Left eye exhibits no discharge.   Neck: No thyromegaly present.   Cardiovascular: Normal rate, regular rhythm and normal heart sounds. Exam reveals no gallop and no friction rub.   No murmur heard.  Pulmonary/Chest: Breath sounds normal. She has no wheezes. She has no rales. She exhibits no tenderness.   Abdominal: There is no abdominal tenderness. There is no rebound and no guarding.   Musculoskeletal:         General: Tenderness present.      Right shoulder: Tenderness present.      Left shoulder: Tenderness present.      Right elbow: Normal.      Left elbow: Tenderness present.      Right wrist: Tenderness present.      Left wrist: Tenderness present.      Cervical back: Neck supple.      Right knee: Effusion present. Tenderness present.      Left knee: Effusion present. Tenderness present.   Lymphadenopathy:     She has no cervical adenopathy.   Neurological: She is alert and oriented to person, place, and time. Gait normal.   Skin: Skin is dry. No rash noted. No erythema. There is pallor.   Psychiatric: Mood, affect and judgment normal.   Vitals reviewed.      Right Side Rheumatological Exam     Examination finds the elbow normal.    The patient is tender to palpation of the shoulder, wrist, knee, 1st PIP, 1st MCP, 2nd PIP, 2nd MCP, 3rd PIP, 3rd MCP, 4th PIP, 4th MCP, 5th PIP and 5th MCP    She has swelling of the 1st PIP, 1st MCP, 2nd PIP, 2nd MCP, 3rd PIP, 3rd MCP, 4th PIP, 4th MCP, 5th PIP and 5th MCP    Shoulder Exam   Tenderness Location: no tenderness    Range of Motion   Active abduction:  abnormal   Adduction: abnormal  Sensation: normal    Knee " Exam   Patellofemoral Crepitus: positive  Effusion: positive  Sensation: normal    Hip Exam   Tenderness Location: posterior  Sensation: normal    Elbow/Wrist Exam   Tenderness Location: no tenderness  Sensation: normal    Left Side Rheumatological Exam     The patient is tender to palpation of the shoulder, elbow, wrist, knee, 1st PIP, 1st MCP, 2nd PIP, 2nd MCP, 3rd PIP, 3rd MCP, 4th PIP, 4th MCP, 5th PIP and 5th MCP.    She has swelling of the 1st PIP, 1st MCP, 2nd PIP, 2nd MCP, 3rd PIP, 3rd MCP, 4th PIP, 4th MCP, 5th PIP and 5th MCP    Shoulder Exam   Tenderness Location: no tenderness    Range of Motion   Active abduction:  abnormal   Sensation: normal    Knee Exam     Patellofemoral Crepitus: positive  Effusion: positive  Sensation: normal    Hip Exam   Tenderness Location: posterior  Sensation: normal    Elbow/Wrist Exam   Sensation: normal      Back/Neck Exam   General Inspection   Gait: normal           Results for orders placed or performed in visit on 08/24/23   ESTRADIOL   Result Value Ref Range    Estradiol 101 See Text pg/mL   PROGESTERONE   Result Value Ref Range    Progesterone 9.2 See Text ng/mL   TESTOSTERONE   Result Value Ref Range    Testosterone, Total 30 5 - 73 ng/dL   FOLLICLE STIMULATING HORMONE   Result Value Ref Range    Follicle Stimulating Hormone 2.17 See Text mIU/mL     MRI Shoulder Without Contrast Right  Order: [id number]  Status: Final result     Visible to patient: Yes (seen)     Next appt: 09/14/2023 at 01:30 PM in Sports Medicine (Cielo Morales MD)     Dx: Chronic right shoulder pain     0 Result Notes    1 Patient Communication  Details    Reading Physician Reading Date Result Priority   John Begum MD  [phone] 2/13/2023 Routine     Narrative & Impression  EXAMINATION:  MRI SHOULDER WITHOUT CONTRAST RIGHT     CLINICAL HISTORY:  Shoulder pain, adhesive capsulitis suspected, xray done;  Pain in right shoulder     TECHNIQUE:  Routine multiplanar MR imaging of the  right shoulder was performed without intravenous or intra-articular contrast material utilizing axial PD fat suppressed, coronal T2 fat-suppressed, and sagittal T1 and T2 fat suppressed pulse sequences.     COMPARISON:  None.     FINDINGS:  There is mild hypertrophic degenerative osteoarthritis of the right acromioclavicular joint.  No acromioclavicular joint effusion.  No abnormal widening of the acromioclavicular joint to suggest an acromioclavicular joint sprain.  There is subacromial/subdeltoid bursal fluid present.  Please correlate clinically for symptoms of subacromial/subdeltoid bursitis.  There is a flat type I acromion present.  There is a 9 mm AP diameter high-grade partial-thickness bursal surface tear (60-70%) of the supraspinatus tendon at its attachment on the greater tuberosity (series 5, image 18).  No definite full-thickness rotator cuff tendon tear appreciated.  The subscapularis tendon and biceps tendon are normal in appearance.  There is mildly prominent fluid surrounding the biceps tendon within the bicipital groove, nonspecific.  Please correlate clinically for symptoms of biceps tendonitis.  The infraspinatus tendon and teres minor tendon are unremarkable.     No discrete labral tear appreciated on this non-arthrographic study.  No paralabral cyst formation.  There is minimal thickening of the inferior joint capsule with adjacent edema like signal observed in the axillary recess on series 4, image 8, nonspecific.  Please correlate for symptoms of adhesive capsulitis.  The rotator cuff musculature shows no evidence of denervation edema, fatty atrophy, or intramuscular mass.  No shoulder joint effusion.     The visualized osseous structures show no evidence of fracture, dislocation, or pathologic marrow replacement process.     Impression:     1. 9 mm AP diameter high-grade partial-thickness bursal surface insertional tear of the supraspinatus tendon at its attachment on the greater  tuberosity.  2. Mild hypertrophic degenerative osteoarthritis of the right acromioclavicular joint and mild subacromial/subdeltoid bursitis.  3. Minimal thickening of the inferior joint capsule with adjacent edema like signal on series 4, image 7-8), nonspecific.  Please correlate for symptoms of adhesive capsulitis.        Assessment:       1. Hashimoto's thyroiditis    2. Rosacea    3. Thyroid nodule    4. Calcific tendinitis of left shoulder    5. Chronic left shoulder pain                Plan:       Juan was seen today for disease management.    Diagnoses and all orders for this visit:    Hashimoto's thyroiditis  -     US Soft Tissue Head Neck Thyroid; Future  -     Insulin, Random; Future  -     Cortisol, free, serum; Future  -     Hemoglobin A1C; Future  -     Anti-Thyroglobulin Antibody; Future  -     T4, Free; Future  -     Thyroid Peroxidase Antibody; Future  -     T3, Free; Future  -     Thyroglobulin; Future  -     Thyroglobulin  -     SELENIUM SERUM; Future  -     Vitamin D; Future    Rosacea  -     US Soft Tissue Head Neck Thyroid; Future  -     Insulin, Random; Future  -     Cortisol, free, serum; Future  -     Hemoglobin A1C; Future  -     Anti-Thyroglobulin Antibody; Future  -     T4, Free; Future  -     Thyroid Peroxidase Antibody; Future  -     T3, Free; Future  -     Thyroglobulin; Future  -     Thyroglobulin  -     SELENIUM SERUM; Future  -     Vitamin D; Future    Thyroid nodule  -     US Soft Tissue Head Neck Thyroid; Future  -     Insulin, Random; Future  -     Cortisol, free, serum; Future  -     Hemoglobin A1C; Future  -     Anti-Thyroglobulin Antibody; Future  -     T4, Free; Future  -     Thyroid Peroxidase Antibody; Future  -     T3, Free; Future  -     Thyroglobulin; Future  -     Thyroglobulin  -     SELENIUM SERUM; Future  -     Vitamin D; Future    Calcific tendinitis of left shoulder  -     SELENIUM SERUM; Future  -     Vitamin D; Future    Chronic left shoulder pain  -     SELENIUM  SERUM; Future  -     Vitamin D; Future         1. Order Ultrasound of thyroid she previously had a nodule    2. Labs ordered  3 please schedule labs this week.    More than 50% of the  62 minute encounter was spent face to face counseling the patient regarding current status and future plan of care as well as side effects  of the medications. All questions were answered to patient's satisfaction also includes  non-face to face time preparing to see the patient (eg, review of tests), Obtaining and/or reviewing separately obtained history, Documenting clinical information in the electronic or other health record, Independently interpreting results

## 2023-09-14 ENCOUNTER — PROCEDURE VISIT (OUTPATIENT)
Dept: SPORTS MEDICINE | Facility: CLINIC | Age: 43
End: 2023-09-14

## 2023-09-14 VITALS
HEIGHT: 62 IN | HEART RATE: 81 BPM | WEIGHT: 114.63 LBS | DIASTOLIC BLOOD PRESSURE: 67 MMHG | BODY MASS INDEX: 21.1 KG/M2 | SYSTOLIC BLOOD PRESSURE: 103 MMHG

## 2023-09-14 DIAGNOSIS — M75.01 ADHESIVE CAPSULITIS OF RIGHT SHOULDER: Primary | ICD-10-CM

## 2023-09-14 DIAGNOSIS — G89.18 OTHER ACUTE POSTPROCEDURAL PAIN: ICD-10-CM

## 2023-09-14 PROCEDURE — 99499 NO LOS: ICD-10-PCS | Mod: CSM,,, | Performed by: STUDENT IN AN ORGANIZED HEALTH CARE EDUCATION/TRAINING PROGRAM

## 2023-09-14 PROCEDURE — 0232T PR INJECT PLATELET PLASMA W/IMG HARVEST/PREPARATOIN: ICD-10-PCS | Mod: CSM,,, | Performed by: STUDENT IN AN ORGANIZED HEALTH CARE EDUCATION/TRAINING PROGRAM

## 2023-09-14 PROCEDURE — 99499 UNLISTED E&M SERVICE: CPT | Mod: CSM,,, | Performed by: STUDENT IN AN ORGANIZED HEALTH CARE EDUCATION/TRAINING PROGRAM

## 2023-09-14 PROCEDURE — 0232T NJX PLATELET PLASMA: CPT | Mod: CSM,,, | Performed by: STUDENT IN AN ORGANIZED HEALTH CARE EDUCATION/TRAINING PROGRAM

## 2023-09-14 RX ORDER — TRAMADOL HYDROCHLORIDE 50 MG/1
50 TABLET ORAL EVERY 6 HOURS PRN
Qty: 28 TABLET | Refills: 0 | Status: SHIPPED | OUTPATIENT
Start: 2023-09-14 | End: 2023-09-21

## 2023-09-14 NOTE — PATIENT INSTRUCTIONS
Please avoid NSAIDs for the nexst 21 days.     NSAIDs include Aleve, naproxen, Advil, Ibuprofen, Celebrex, Meloxicam and Diclofenac.     A prescription for Tramadol has been sent to your pharmacy of choice and can be taken as needed for pain.

## 2023-09-14 NOTE — PROCEDURES
"Procedures   Subjective:     Juan Ramirez     Chief Complaint   Patient presents with    Right Shoulder - Pain       Juan is a 42 y.o. female coming in today for an Autologous Conditioned Plasma (ACP) injection. Written consent obtained prior to procedure.     Objective:     VITAL SIGNS: /67   Pulse 81   Ht 5' 2" (1.575 m)   Wt 52 kg (114 lb 10.2 oz)   BMI 20.97 kg/m²      PRE-PROCEDURE DIAGNOSIS:   1. Adhesive capsulitis of right shoulder  Sports Medicine US - Guidance for Needle Placement          PROCEDURE TYPE: ACP injection to the right glenohumeral joint under ultrasound guidance  .  RISKS, BENEFITS, ADVERSE EFFECTS:  We reviewed that this is a medical procedure involving first the harvesting of some of patients own blood. This tissue will be minimally manipulated and prepared for injection; and finally the tissue will be injected into damaged ligaments, tendons, muscle and/or joints, for the purpose of strengthening and healing and/or lessening pain from the damaged areas. The diagnosis, the nature of the treatment, and the theoretical basis of regenerative medicine has been explained. The patient was given the opportunity to ask any questions concerning the specific procedure. We further reviewed that in many medical circles this is considered experimental and/or investigational. It is not standard of care in many medical communities. It is also not covered under insurance. We further reviewed this treatment is part of a treatment program, and all parts of the program are essential to a successful outcome, including but not limited to post-injection physical therapy at home and/or in office. As part of a treatment program, this procedure treatment may involve several injections given at each treatment session AND may require multiple treatment sessions. We reviewed that, while this is inherently a safe procedure, there are risks involved. The specific risks depend on the tissue being aspirated " "and the reinjection site of treatment.     RISKS INCLUDE, but not be limited to:   COMMON:   -Soreness and/or moderately increased pain, with bruising for several days after each treatment  -typically decreased range of motion from what the patient might feel on most days   -Improvement in pain and function may take 4-12 weeks, occasionally longer in some cases   RARE:    - Patient expressed understanding that risks can include, but not be limited to, Infection, worsening pain, headache, bleeding (bruising or hematoma) allergic reaction (itching, rash, shortness of breath, seizures, and even death), and other rare risks can be present due to the nature of the location of the procedure including temporary or permanent nerve damage.     POTENTIAL BENEFITS include: reduction or elimination of pain and greater function. We also reviewed alternative or conventional methods of treatment, and the probability that the proposed treatment will or will not be successful. No guarantee or assurance has been made to me relative to results that may be expected from these techniques. Furthermore, patient understands that he/she/they may withdraw from treatment at any time.     Written consent obtained from patient prior to performing procedure.     PROCEDURE DETAILS:     Sterile technique was used to phlebotomize 12.5 mL of the patients blood from a peripheral vein. This blood was  into density-divided layers using an "[employer]" ACP centrifuge. The layer of ACP, a volume of 2.2 mL, was placed into a sterile syringe in preparation for injection.    Timeout: Prior to procedure the correct patient, procedure, and site was verified     Joint: The tip of the 21 gauge 3.5" needle was used to enter the joint capsule. 2.2 mL of autologous conditioned plasma was injected in total.       The procedure was tolerated well. Immobilization of the tendon was obtained and was instructed to be maintained for 12-24 hours. .     DISCHARGE " CONDITION: The patient tolerated the procedure well and there were no immediate complications. The patient was instructed to call the clinic immediately for any mild to moderate adverse side effects, or to call 911 in the event of an emergency.    Complications: none     Estimated blood loss from injection procedure: none     Joint aspirate volume: 0 mL    POST PROCEDURE INSTRUCTIONS: Post-procedure care  Following the procedure, a sterile bandage was placed over the injection site.     Description of ultrasound utilization for needle guidance:  Ultrasound guidance was used for needle localization with SonQool Edge 2, C1-5 MHz probe(s). Images were saved and stored for documentation. The glenohumeral joint was visualized. Dynamic visualization of the needle(s)/probe was continuous throughout the procedure and maintained good position and correct needle placement.        Assessment:      Encounter Diagnosis   Name Primary?    Adhesive capsulitis of right shoulder Yes          Plan:     Autologous Condition Plasma injection procedure performed today for left shoulder adhesive capsulitis (see details above).  Take home instructions handout given to patient.   - Recommend Extra-strength Tylenol (Acetaminophen 500mg) 2 tables every 4-6 hours, but not to exceed 3000mg (6 pills) in a day.  If pain is not controlled by exercise and Tylenol, patient instructed to take tramadol 50 mg Q 6 hr p.r.n. for pain control instead.   - recommend avoiding NSAIDs for at least 21 days, but ideally throughout the entire treatment period    2. Follow-up in 3 weeks for reevaluation    3. Patient agreeable to today's plan and all questions were answered    This note is dictated using the M*Modal Fluency Direct word recognition program. There are word recognition mistakes that are occasionally missed on review.

## 2023-09-15 ENCOUNTER — PATIENT MESSAGE (OUTPATIENT)
Dept: SPORTS MEDICINE | Facility: CLINIC | Age: 43
End: 2023-09-15
Payer: MEDICAID

## 2023-09-15 ENCOUNTER — LAB VISIT (OUTPATIENT)
Dept: LAB | Facility: HOSPITAL | Age: 43
End: 2023-09-15
Attending: INTERNAL MEDICINE
Payer: MEDICAID

## 2023-09-15 DIAGNOSIS — L71.9 ROSACEA: ICD-10-CM

## 2023-09-15 DIAGNOSIS — M25.512 CHRONIC LEFT SHOULDER PAIN: ICD-10-CM

## 2023-09-15 DIAGNOSIS — E04.1 THYROID NODULE: ICD-10-CM

## 2023-09-15 DIAGNOSIS — E06.3 HASHIMOTO'S THYROIDITIS: ICD-10-CM

## 2023-09-15 DIAGNOSIS — G89.29 CHRONIC LEFT SHOULDER PAIN: ICD-10-CM

## 2023-09-15 DIAGNOSIS — M75.32 CALCIFIC TENDINITIS OF LEFT SHOULDER: ICD-10-CM

## 2023-09-15 LAB
25(OH)D3+25(OH)D2 SERPL-MCNC: 25 NG/ML (ref 30–96)
ESTIMATED AVG GLUCOSE: 80 MG/DL (ref 68–131)
HBA1C MFR BLD: 4.4 % (ref 4–5.6)
INSULIN COLLECTION INTERVAL: NORMAL
INSULIN SERPL-ACNC: 4.5 UU/ML
T3FREE SERPL-MCNC: 2.3 PG/ML (ref 2.3–4.2)
T4 FREE SERPL-MCNC: 0.91 NG/DL (ref 0.71–1.51)
THYROGLOB AB SERPL IA-ACNC: 9.3 IU/ML (ref 0–3.9)
THYROPEROXIDASE IGG SERPL-ACNC: 348.9 IU/ML

## 2023-09-15 PROCEDURE — 83525 ASSAY OF INSULIN: CPT | Performed by: INTERNAL MEDICINE

## 2023-09-15 PROCEDURE — 84481 FREE ASSAY (FT-3): CPT | Performed by: INTERNAL MEDICINE

## 2023-09-15 PROCEDURE — 36415 COLL VENOUS BLD VENIPUNCTURE: CPT | Mod: PO | Performed by: INTERNAL MEDICINE

## 2023-09-15 PROCEDURE — 84439 ASSAY OF FREE THYROXINE: CPT | Performed by: INTERNAL MEDICINE

## 2023-09-15 PROCEDURE — 86800 THYROGLOBULIN ANTIBODY: CPT | Performed by: INTERNAL MEDICINE

## 2023-09-15 PROCEDURE — 82306 VITAMIN D 25 HYDROXY: CPT | Performed by: INTERNAL MEDICINE

## 2023-09-15 PROCEDURE — 84255 ASSAY OF SELENIUM: CPT | Performed by: INTERNAL MEDICINE

## 2023-09-15 PROCEDURE — 83036 HEMOGLOBIN GLYCOSYLATED A1C: CPT | Performed by: INTERNAL MEDICINE

## 2023-09-15 PROCEDURE — 86376 MICROSOMAL ANTIBODY EACH: CPT | Performed by: INTERNAL MEDICINE

## 2023-09-15 PROCEDURE — 82530 CORTISOL FREE: CPT | Performed by: INTERNAL MEDICINE

## 2023-09-17 NOTE — PROGRESS NOTES
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 Vit d is slightly low

## 2023-09-18 LAB — SELENIUM SERPL-MCNC: 113 MCG/L (ref 110–165)

## 2023-09-22 ENCOUNTER — HOSPITAL ENCOUNTER (OUTPATIENT)
Dept: RADIOLOGY | Facility: HOSPITAL | Age: 43
Discharge: HOME | End: 2023-09-22
Attending: INTERNAL MEDICINE
Payer: MEDICAID

## 2023-09-22 DIAGNOSIS — E06.3 HASHIMOTO'S THYROIDITIS: ICD-10-CM

## 2023-09-22 DIAGNOSIS — E04.1 THYROID NODULE: ICD-10-CM

## 2023-09-22 DIAGNOSIS — L71.9 ROSACEA: ICD-10-CM

## 2023-09-22 LAB — CORTIS F SERPL-MCNC: 0.48 MCG/DL

## 2023-09-22 PROCEDURE — 76536 US EXAM OF HEAD AND NECK: CPT | Mod: 26,,, | Performed by: RADIOLOGY

## 2023-09-22 PROCEDURE — 76536 US EXAM OF HEAD AND NECK: CPT | Mod: TC,PO

## 2023-09-22 PROCEDURE — 76536 US SOFT TISSUE HEAD NECK THYROID: ICD-10-PCS | Mod: 26,,, | Performed by: RADIOLOGY

## 2023-11-21 ENCOUNTER — OFFICE VISIT (OUTPATIENT)
Dept: URGENT CARE | Facility: CLINIC | Age: 43
End: 2023-11-21
Payer: MEDICAID

## 2023-11-21 VITALS
RESPIRATION RATE: 17 BRPM | OXYGEN SATURATION: 98 % | SYSTOLIC BLOOD PRESSURE: 114 MMHG | HEIGHT: 62 IN | WEIGHT: 116 LBS | BODY MASS INDEX: 21.35 KG/M2 | TEMPERATURE: 99 F | DIASTOLIC BLOOD PRESSURE: 80 MMHG | HEART RATE: 79 BPM

## 2023-11-21 DIAGNOSIS — J02.9 SORE THROAT: ICD-10-CM

## 2023-11-21 DIAGNOSIS — J06.9 VIRAL URI WITH COUGH: Primary | ICD-10-CM

## 2023-11-21 LAB
CTP QC/QA: YES
MOLECULAR STREP A: NEGATIVE
POC MOLECULAR INFLUENZA A AGN: NEGATIVE
POC MOLECULAR INFLUENZA B AGN: NEGATIVE
SARS-COV-2 AG RESP QL IA.RAPID: NEGATIVE

## 2023-11-21 PROCEDURE — 87811 SARS CORONAVIRUS 2 ANTIGEN POCT, MANUAL READ: ICD-10-PCS | Mod: QW,S$GLB,, | Performed by: PHYSICIAN ASSISTANT

## 2023-11-21 PROCEDURE — 87811 SARS-COV-2 COVID19 W/OPTIC: CPT | Mod: QW,S$GLB,, | Performed by: PHYSICIAN ASSISTANT

## 2023-11-21 PROCEDURE — 87651 POCT STREP A MOLECULAR: ICD-10-PCS | Mod: QW,S$GLB,, | Performed by: PHYSICIAN ASSISTANT

## 2023-11-21 PROCEDURE — 87651 STREP A DNA AMP PROBE: CPT | Mod: QW,S$GLB,, | Performed by: PHYSICIAN ASSISTANT

## 2023-11-21 PROCEDURE — 99214 OFFICE O/P EST MOD 30 MIN: CPT | Mod: S$GLB,,, | Performed by: PHYSICIAN ASSISTANT

## 2023-11-21 PROCEDURE — 99214 PR OFFICE/OUTPT VISIT, EST, LEVL IV, 30-39 MIN: ICD-10-PCS | Mod: S$GLB,,, | Performed by: PHYSICIAN ASSISTANT

## 2023-11-21 PROCEDURE — 87502 POCT INFLUENZA A/B MOLECULAR: ICD-10-PCS | Mod: QW,S$GLB,, | Performed by: PHYSICIAN ASSISTANT

## 2023-11-21 PROCEDURE — 87502 INFLUENZA DNA AMP PROBE: CPT | Mod: QW,S$GLB,, | Performed by: PHYSICIAN ASSISTANT

## 2023-11-21 RX ORDER — PREDNISONE 10 MG/1
TABLET ORAL
Qty: 11 TABLET | Refills: 0 | Status: CANCELLED | OUTPATIENT
Start: 2023-11-21

## 2023-11-21 RX ORDER — FLUTICASONE PROPIONATE 50 MCG
1 SPRAY, SUSPENSION (ML) NASAL 2 TIMES DAILY
Qty: 16 G | Refills: 0 | Status: SHIPPED | OUTPATIENT
Start: 2023-11-21

## 2023-11-21 RX ORDER — PREDNISONE 10 MG/1
TABLET ORAL
Qty: 11 TABLET | Refills: 0 | Status: SHIPPED | OUTPATIENT
Start: 2023-11-21

## 2023-11-21 NOTE — PROGRESS NOTES
"Subjective:      Patient ID: Juan Ramirez is a 42 y.o. female.    Vitals:  height is 5' 2" (1.575 m) and weight is 52.6 kg (116 lb). Her oral temperature is 98.5 °F (36.9 °C). Her blood pressure is 114/80 and her pulse is 79. Her respiration is 17 and oxygen saturation is 98%.     Chief Complaint: Cough    Pt presents today with c/o sore throat x's 1 day. Pt has not taken anything for symptoms. Pt has been exposed to strep. Pain level 9/10.    Sore Throat   This is a new problem. The current episode started yesterday. The problem has been unchanged. Neither side of throat is experiencing more pain than the other. There has been no fever. The pain is at a severity of 9/10. The pain is severe. Associated symptoms include congestion, coughing, ear pain, a hoarse voice, swollen glands and trouble swallowing. Pertinent negatives include no abdominal pain, diarrhea, drooling, ear discharge, headaches, plugged ear sensation, neck pain, shortness of breath, stridor or vomiting. She has had exposure to strep. She has had no exposure to mono. She has tried nothing for the symptoms. The treatment provided no relief.       HENT:  Positive for ear pain, congestion, sore throat and trouble swallowing. Negative for ear discharge and drooling.    Neck: Negative for neck pain.   Respiratory:  Positive for cough. Negative for shortness of breath and stridor.    Gastrointestinal:  Negative for abdominal pain, vomiting and diarrhea.   Neurological:  Negative for headaches.      Objective:     Physical Exam   Constitutional: She does not appear ill. No distress.   HENT:   Head: Normocephalic and atraumatic.   Ears:   Right Ear: External ear and ear canal normal. Tympanic membrane is scarred. Tympanic membrane is not erythematous and not bulging.   Left Ear: External ear and ear canal normal. Tympanic membrane is scarred. Tympanic membrane is not erythematous and not bulging.   Mouth/Throat: Mucous membranes are moist. No oropharyngeal " exudate or posterior oropharyngeal erythema. Oropharynx is clear.   Eyes: Conjunctivae are normal. Right eye exhibits no discharge. Left eye exhibits no discharge. Extraocular movement intact   Cardiovascular: Normal rate, regular rhythm and normal heart sounds.   No murmur heard.  Pulmonary/Chest: Effort normal and breath sounds normal. She has no wheezes. She has no rhonchi. She has no rales.   Abdominal: Normal appearance.   Musculoskeletal: Normal range of motion.         General: Normal range of motion.   Neurological: no focal deficit. She is alert.   Skin: Skin is warm, dry and not pale. jaundice  Psychiatric: Her behavior is normal. Mood, judgment and thought content normal.   Nursing note and vitals reviewed.      Assessment:     1. Viral URI with cough    2. Sore throat        Plan:       Viral URI with cough    Sore throat  -     POCT Strep A, Molecular  -     SARS Coronavirus 2 Antigen, POCT Manual Read  -     POCT Influenza A/B MOLECULAR    Results for orders placed or performed in visit on 11/21/23   POCT Strep A, Molecular   Result Value Ref Range    Molecular Strep A, POC Negative Negative     Acceptable Yes    SARS Coronavirus 2 Antigen, POCT Manual Read   Result Value Ref Range    SARS Coronavirus 2 Antigen Negative Negative     Acceptable Yes    POCT Influenza A/B MOLECULAR   Result Value Ref Range    POC Molecular Influenza A Ag Negative Negative, Not Reported    POC Molecular Influenza B Ag Negative Negative, Not Reported     Acceptable Yes         Other orders  -     predniSONE (DELTASONE) 10 MG tablet; Take 40mg for 1 days, take 30mg for 1 days, take 20mg for 1 days, take 10mg for 2 days  Dispense: 11 tablet; Refill: 0  -     fluticasone propionate (FLONASE ALLERGY RELIEF) 50 mcg/actuation nasal spray; 1 spray (50 mcg total) by Each Nostril route 2 (two) times daily.  Dispense: 16 g; Refill: 0      Patient Education        Viral Upper Respiratory  Infection Discharge Instructions, Adult   About this topic   You have an upper respiratory infection or URI. A URI can affect your nose, throat, ears, and sinuses. A virus is the cause of almost all URIs and antibiotics will not help you feel better more quickly. The common cold is an example of a viral URI.  URIs are easy to spread from person to person, most often through coughing or sneezing. A URI will almost always get better in a week or two without any treatment.         What care is needed at home?   Ask your doctor what you need to do when you go home. Make sure you ask questions if you do not understand what the doctor says.  If you smoke, try to quit. Your doctor or nurse can help.  Drink lots of fluids like water, juice, or broth. This will help replace any fluids lost if you have a runny nose or fever. Warm tea or soup can help soothe a sore throat.  If the air in your home feels dry, use a cool mist humidifier. This can help a stuffy nose and make it easier to breathe.  You can also use saline nose drops to relieve stuffiness.  If you decide to take over-the-counter cough or cold medicines, follow the directions on the label carefully. Be sure you do not take more than 1 medicine that contains acetaminophen. Also, if you have a heart problem or high blood pressure, check with your doctor before you take any of these medicines.  Wash your hands often. Cough or sneeze into a tissue or your elbow instead of your hands. This will help keep others healthy.  What follow-up care is needed?   Your doctor may ask you to make visits to the office to check on your progress. Be sure to keep these visits.  What drugs may be needed?   The doctor may order drugs to:  Open up the tubes of your lungs  Treat viral infection  Relieve or stop coughing  Help with pain from a sore throat  Relieve runny and stuffy nose  Provide oxygen  Will physical activity be limited?   You need to rest for a few days to let your body  recover from the infection.  What changes to diet are needed?   Eat soft foods like soup if swallowing is too painful.  What problems could happen?   Asthma attack  Sinus infections  Lung problems like pneumonia and bronchitis  Severe fluid loss. This is dehydration.  What can be done to prevent this health problem?   Wash your hands often with soap and water for at least 20 seconds, especially after coughing or sneezing. Alcohol-based hand sanitizers also work to kill the virus.  If you are sick, cover your mouth and nose with tissue when you cough or sneeze. You can also cough into your elbow. Throw away tissues in the trash and wash your hands after touching used tissues.  Do not get too close (kissing, hugging) to people who are sick.  Do not share towels or hankies with anyone who is sick. Clean commonly handled things like door handles, remotes, toys, and phones. Wipe them with a disinfectant.  Stay away from crowded places.  Cover your nose and mouth when you sneeze or cough.  Take vitamin C to help build up your body's ability to fight disease.  Get a flu shot each year.  When do I need to call the doctor?   You have trouble breathing when talking or sitting still.  You have a fever of 100.4°F (38°C) or higher for several days, chills, a very bad sore throat, or ear or sinus pain.  You develop a new fever after several days of feeling the same or improving.  You develop chest pain when you cough.  You have a cough that lasts more than 10 days.  You cough up blood, or the color of the mucus you cough up changes.  Teach Back: Helping You Understand   The Teach Back Method helps you understand the information we are giving you. After you talk with the staff, tell them in your own words what you learned. This helps to make sure the staff has described each thing clearly. It also helps to explain things that may have been confusing. Before going home, make sure you can do these:  I can tell you about my  condition.  I can tell you what may help ease my signs.  I can tell you what I will do if I have a fever, chills, breathing very fast, or trouble breathing.  Where can I learn more?   American Lung Association  https://www.lung.org/blog/can-you-exercise-with-a-cold   American Lung Association  https://www.lung.org/lung-health-diseases/lung-disease-lookup/influenza/facts-about-the-common-cold   NHS Choices  https://www.nhs.uk/conditions/respiratory-tract-infection/   UpToDate  https://www.[employer]/contents/the-common-cold-in-adults-beyond-the-basics   Last Reviewed Date   2021-06-08  Consumer Information Use and Disclaimer   This information is not specific medical advice and does not replace information you receive from your health care provider. This is only a brief summary of general information. It does NOT include all information about conditions, illnesses, injuries, tests, procedures, treatments, therapies, discharge instructions or life-style choices that may apply to you. You must talk with your health care provider for complete information about your health and treatment options. This information should not be used to decide whether or not to accept your health care providers advice, instructions or recommendations. Only your health care provider has the knowledge and training to provide advice that is right for you.  Copyright   Copyright © 2021 UpToDate[employer] Inc[employer] and its affiliates and/or licensors. All rights reserved.

## 2023-11-21 NOTE — LETTER
November 21, 2023      [facility] [place] [address], [address]  [facility] [address]  Phone: [phone]  Fax: [phone]       Patient: Juan Ramirez   YOB: 1980  Date of Visit: 11/21/2023    To Whom It May Concern:    Mamie Ramirez  was at [facility] on 11/21/2023. The patient may return to work/school on 11/22/2023 with no restrictions. If you have any questions or concerns, or if I can be of further assistance, please do not hesitate to contact me.    Sincerely,    ABILIO Nguyen

## 2024-03-22 ENCOUNTER — LAB VISIT (OUTPATIENT)
Dept: LAB | Facility: HOSPITAL | Age: 44
End: 2024-03-22
Attending: INTERNAL MEDICINE
Payer: MEDICAID

## 2024-03-22 ENCOUNTER — OFFICE VISIT (OUTPATIENT)
Dept: RHEUMATOLOGY | Facility: CLINIC | Age: 44
End: 2024-03-22
Payer: MEDICAID

## 2024-03-22 VITALS
DIASTOLIC BLOOD PRESSURE: 80 MMHG | HEART RATE: 89 BPM | WEIGHT: 111.31 LBS | HEIGHT: 62 IN | BODY MASS INDEX: 20.48 KG/M2 | SYSTOLIC BLOOD PRESSURE: 117 MMHG

## 2024-03-22 DIAGNOSIS — H20.9 UVEITIS: ICD-10-CM

## 2024-03-22 DIAGNOSIS — L71.9 ROSACEA: ICD-10-CM

## 2024-03-22 DIAGNOSIS — E06.3 HASHIMOTO'S THYROIDITIS: ICD-10-CM

## 2024-03-22 DIAGNOSIS — N97.0 INFERTILITY ASSOCIATED WITH ANOVULATION: ICD-10-CM

## 2024-03-22 DIAGNOSIS — N97.0 INFERTILITY ASSOCIATED WITH ANOVULATION: Primary | ICD-10-CM

## 2024-03-22 LAB
25(OH)D3+25(OH)D2 SERPL-MCNC: 20 NG/ML (ref 30–96)
ALBUMIN SERPL BCP-MCNC: 3.8 G/DL (ref 3.5–5.2)
ALP SERPL-CCNC: 56 U/L (ref 55–135)
ALT SERPL W/O P-5'-P-CCNC: 17 U/L (ref 10–44)
ANION GAP SERPL CALC-SCNC: 7 MMOL/L (ref 8–16)
AST SERPL-CCNC: 18 U/L (ref 10–40)
BASOPHILS # BLD AUTO: 0.09 K/UL (ref 0–0.2)
BASOPHILS NFR BLD: 1.1 % (ref 0–1.9)
BILIRUB SERPL-MCNC: 0.6 MG/DL (ref 0.1–1)
BUN SERPL-MCNC: 13 MG/DL (ref 6–20)
CALCIUM SERPL-MCNC: 9.6 MG/DL (ref 8.7–10.5)
CHLORIDE SERPL-SCNC: 108 MMOL/L (ref 95–110)
CO2 SERPL-SCNC: 25 MMOL/L (ref 23–29)
CREAT SERPL-MCNC: 0.8 MG/DL (ref 0.5–1.4)
CRP SERPL-MCNC: 0.4 MG/L (ref 0–8.2)
DIFFERENTIAL METHOD BLD: ABNORMAL
EOSINOPHIL # BLD AUTO: 0.1 K/UL (ref 0–0.5)
EOSINOPHIL NFR BLD: 1.7 % (ref 0–8)
ERYTHROCYTE [DISTWIDTH] IN BLOOD BY AUTOMATED COUNT: 13 % (ref 11.5–14.5)
ERYTHROCYTE [SEDIMENTATION RATE] IN BLOOD BY PHOTOMETRIC METHOD: 17 MM/HR (ref 0–36)
EST. GFR  (NO RACE VARIABLE): >60 ML/MIN/1.73 M^2
GLUCOSE SERPL-MCNC: 71 MG/DL (ref 70–110)
HCT VFR BLD AUTO: 43.6 % (ref 37–48.5)
HGB BLD-MCNC: 13.7 G/DL (ref 12–16)
IMM GRANULOCYTES # BLD AUTO: 0.02 K/UL (ref 0–0.04)
IMM GRANULOCYTES NFR BLD AUTO: 0.2 % (ref 0–0.5)
LYMPHOCYTES # BLD AUTO: 2.1 K/UL (ref 1–4.8)
LYMPHOCYTES NFR BLD: 24.5 % (ref 18–48)
MCH RBC QN AUTO: 30.7 PG (ref 27–31)
MCHC RBC AUTO-ENTMCNC: 31.4 G/DL (ref 32–36)
MCV RBC AUTO: 98 FL (ref 82–98)
MONOCYTES # BLD AUTO: 0.5 K/UL (ref 0.3–1)
MONOCYTES NFR BLD: 6.3 % (ref 4–15)
NEUTROPHILS # BLD AUTO: 5.6 K/UL (ref 1.8–7.7)
NEUTROPHILS NFR BLD: 66.2 % (ref 38–73)
NRBC BLD-RTO: 0 /100 WBC
PLATELET # BLD AUTO: 281 K/UL (ref 150–450)
PMV BLD AUTO: 11.7 FL (ref 9.2–12.9)
POTASSIUM SERPL-SCNC: 4.6 MMOL/L (ref 3.5–5.1)
PROT SERPL-MCNC: 6.7 G/DL (ref 6–8.4)
RBC # BLD AUTO: 4.46 M/UL (ref 4–5.4)
SODIUM SERPL-SCNC: 140 MMOL/L (ref 136–145)
T3FREE SERPL-MCNC: 2.5 PG/ML (ref 2.3–4.2)
T4 FREE SERPL-MCNC: 0.99 NG/DL (ref 0.71–1.51)
TSH SERPL DL<=0.005 MIU/L-ACNC: 2.78 UIU/ML (ref 0.4–4)
WBC # BLD AUTO: 8.4 K/UL (ref 3.9–12.7)

## 2024-03-22 PROCEDURE — 86800 THYROGLOBULIN ANTIBODY: CPT | Performed by: INTERNAL MEDICINE

## 2024-03-22 PROCEDURE — 3074F SYST BP LT 130 MM HG: CPT | Mod: CPTII,,, | Performed by: INTERNAL MEDICINE

## 2024-03-22 PROCEDURE — 84481 FREE ASSAY (FT-3): CPT | Performed by: INTERNAL MEDICINE

## 2024-03-22 PROCEDURE — 84443 ASSAY THYROID STIM HORMONE: CPT | Performed by: INTERNAL MEDICINE

## 2024-03-22 PROCEDURE — 85025 COMPLETE CBC W/AUTO DIFF WBC: CPT | Performed by: INTERNAL MEDICINE

## 2024-03-22 PROCEDURE — 82306 VITAMIN D 25 HYDROXY: CPT | Performed by: INTERNAL MEDICINE

## 2024-03-22 PROCEDURE — 99215 OFFICE O/P EST HI 40 MIN: CPT | Mod: S$PBB,,, | Performed by: INTERNAL MEDICINE

## 2024-03-22 PROCEDURE — 99213 OFFICE O/P EST LOW 20 MIN: CPT | Mod: PBBFAC,PN | Performed by: INTERNAL MEDICINE

## 2024-03-22 PROCEDURE — 85651 RBC SED RATE NONAUTOMATED: CPT | Mod: PO | Performed by: INTERNAL MEDICINE

## 2024-03-22 PROCEDURE — 80053 COMPREHEN METABOLIC PANEL: CPT | Performed by: INTERNAL MEDICINE

## 2024-03-22 PROCEDURE — 3079F DIAST BP 80-89 MM HG: CPT | Mod: CPTII,,, | Performed by: INTERNAL MEDICINE

## 2024-03-22 PROCEDURE — 99999 PR PBB SHADOW E&M-EST. PATIENT-LVL III: CPT | Mod: PBBFAC,,, | Performed by: INTERNAL MEDICINE

## 2024-03-22 PROCEDURE — 86376 MICROSOMAL ANTIBODY EACH: CPT | Performed by: INTERNAL MEDICINE

## 2024-03-22 PROCEDURE — 85652 RBC SED RATE AUTOMATED: CPT | Performed by: INTERNAL MEDICINE

## 2024-03-22 PROCEDURE — 36415 COLL VENOUS BLD VENIPUNCTURE: CPT | Mod: PO | Performed by: INTERNAL MEDICINE

## 2024-03-22 PROCEDURE — 86140 C-REACTIVE PROTEIN: CPT | Performed by: INTERNAL MEDICINE

## 2024-03-22 PROCEDURE — 84439 ASSAY OF FREE THYROXINE: CPT | Performed by: INTERNAL MEDICINE

## 2024-03-22 PROCEDURE — 1159F MED LIST DOCD IN RCRD: CPT | Mod: CPTII,,, | Performed by: INTERNAL MEDICINE

## 2024-03-22 PROCEDURE — 3008F BODY MASS INDEX DOCD: CPT | Mod: CPTII,,, | Performed by: INTERNAL MEDICINE

## 2024-03-22 RX ORDER — CLOMIPHENE CITRATE 50 MG/1
50 TABLET ORAL DAILY
Qty: 10 TABLET | Refills: 0 | Status: SHIPPED | OUTPATIENT
Start: 2024-03-22 | End: 2024-04-01

## 2024-03-22 NOTE — PROGRESS NOTES
Subjective:     Patient ID:  Juan Ramirez    Chief Complaint:  Disease Management     History of Present Illness:  Follow up:41 yo female hashmoto's thyroiditis and lumbar sacral pain she is gluten free x 2 yearshe had what looks malar rash four years ago she resolved  but cleaning up diet.  She had an elevation of TBG first time she  took serrapeptase x 1 month then DC. She had PRP R shoulder. she started Doing fair, she had an episode of erythem,on her breast and back, May 1, 2021,  she is doing PRP injection R shoulder.     Rheumatologic History:   - Diagnosis/es:  - Positive serologies:  - Infectious screening labs:  - Previous Treatments:  - Current Treatments:     Interval History:   Hospitalization since last office visit: No    Patient Active Problem List    Diagnosis Date Noted    Left shoulder pain 2022    Shoulder weakness 2022    Decreased ROM of left shoulder 2022    Calcific tendinitis of left shoulder 2021    Family history of breast cancer 2015    History of abnormal cervical Pap smear 2015    Hypothyroidism due to Hashimoto's thyroiditis 2014    Thyroid nodule 2014     Past Surgical History:   Procedure Laterality Date    CERVICAL BIOPSY  W/ LOOP ELECTRODE EXCISION  2006    ECTOPIC PREGNANCY SURGERY  2009    right salpingostomy    TENOTOMY OF SINGLE TENDON OF SHOULDER REGION Left 10/7/2022    Procedure: TENOTOMY, SHOULDER REGION, 1 TENDON;  Surgeon: Phill Fried MD;  Location: [facility];  Service: Orthopedics;  Laterality: Left;    TONSILLECTOMY       Social History     Tobacco Use    Smoking status: Former     Current packs/day: 0.00     Types: Cigarettes     Quit date: 3/3/1995     Years since quittin.1    Smokeless tobacco: Never   Substance Use Topics    Alcohol use: Never     Alcohol/week: 0.0 standard drinks of alcohol     Comment: Occ.    Drug use: Never     Family History   Problem Relation Age of Onset    Breast cancer Paternal  Grandmother 58        inflammatory    Breast cancer Mother 44        unilat, s/p, chemo, unk if TNBC, no germline testing    Other Father         born with 1 kidney    Scoliosis Daughter         tested negative for Charcot-Esther-Tooth    Scoliosis Son     Charcot-Esther-Tooth disease Son         from his father    Skin cancer Maternal Aunt         pt believes, ?type, nose (lived most of life in [place]_    No Known Problems Daughter     No Known Problems Other     No Known Problems Other     No Known Problems Other     Colon cancer Neg Hx     Ovarian cancer Neg Hx     Allergic rhinitis Neg Hx     Allergies Neg Hx     Angioedema Neg Hx     Asthma Neg Hx     Atopy Neg Hx     Eczema Neg Hx     Immunodeficiency Neg Hx     Rhinitis Neg Hx     Urticaria Neg Hx     Cancer Neg Hx      Review of patient's allergies indicates:  No Known Allergies    Review of Systems   Review of Systems   Constitutional:  Positive for fatigue. Negative for activity change, appetite change, chills, diaphoresis, fever and unexpected weight change.   HENT:  Negative for congestion, dental problem, ear discharge, ear pain, facial swelling, mouth sores, nosebleeds, postnasal drip, rhinorrhea, sinus pressure, sneezing, sore throat, tinnitus, trouble swallowing and voice change.    Eyes:  Negative for photophobia, pain, discharge, redness and itching.   Respiratory:  Positive for cough. Negative for apnea, chest tightness, shortness of breath and wheezing.    Cardiovascular:  Positive for leg swelling. Negative for chest pain and palpitations.   Gastrointestinal:  Positive for abdominal pain. Negative for abdominal distention, constipation, diarrhea, nausea and vomiting.   Endocrine: Negative for cold intolerance, heat intolerance, polydipsia and polyuria.   Genitourinary:  Negative for decreased urine volume, difficulty urinating, dysuria, flank pain, frequency, hematuria and urgency.   Musculoskeletal:  Positive for arthralgias, back pain, gait problem,  "joint swelling, myalgias, neck pain and neck stiffness.   Skin:  Negative for pallor, rash and wound.   Allergic/Immunologic: Negative for immunocompromised state.   Neurological:  Negative for dizziness, tremors, weakness, numbness and headaches.   Hematological:  Negative for adenopathy. Does not bruise/bleed easily.   Psychiatric/Behavioral:  Negative for sleep disturbance. The patient is not nervous/anxious.         Current Medications:  Current Outpatient Medications   Medication Instructions    clomiPHENE (CLOMID) 50 mg, Oral, Daily    fluticasone propionate (FLONASE ALLERGY RELIEF) 50 mcg, Each Nostril, 2 times daily    metroNIDAZOLE (METROGEL) 0.75 % gel Topical (Top), Daily    predniSONE (DELTASONE) 10 MG tablet Take 40mg for 1 days, take 30mg for 1 days, take 20mg for 1 days, take 10mg for 2 days    RETIN-A 0.025 % cream No dose, route, or frequency recorded.    TIROSINT 13 mcg Cap TAKE 1 CAPSULE BY MOUTH EVERY DAY WITH THE 50 MCG    TIROSINT 50 mcg Cap TAKE 1 CAPSULE BY MOUTH EVERY DAY         Objective:     Vitals:    03/22/24 1341   BP: 117/80   Pulse: 89   Weight: 50.5 kg (111 lb 5.3 oz)   Height: 5' 2.01" (1.575 m)   PainSc:   1   PainLoc: Shoulder      Body mass index is 20.36 kg/m².     Physical Examinations:  Physical Exam   Constitutional: She is oriented to person, place, and time. She appears distressed.   HENT:   Head: Normocephalic and atraumatic.   Eyes: Pupils are equal, round, and reactive to light. Right eye exhibits no discharge. Left eye exhibits no discharge.   Neck: No thyromegaly present.   Cardiovascular: Normal rate, regular rhythm and normal heart sounds. Exam reveals no gallop and no friction rub.   No murmur heard.  Pulmonary/Chest: Breath sounds normal. She has no wheezes. She has no rales. She exhibits no tenderness.   Abdominal: There is no abdominal tenderness. There is no rebound and no guarding.   Musculoskeletal:         General: Tenderness present.      Right shoulder: " Tenderness present.      Left shoulder: Tenderness present.      Right elbow: Normal.      Left elbow: Tenderness present.      Right wrist: Tenderness present.      Left wrist: Tenderness present.      Cervical back: Neck supple.      Right knee: Effusion present. Tenderness present.      Left knee: Effusion present. Tenderness present.   Lymphadenopathy:     She has no cervical adenopathy.   Neurological: She is alert and oriented to person, place, and time. Gait normal.   Skin: Skin is dry. No rash noted. No erythema. There is pallor.   Psychiatric: Mood and affect normal.   Vitals reviewed.      Right Side Rheumatological Exam     Examination finds the elbow normal.    The patient is tender to palpation of the shoulder, wrist, knee, 1st PIP, 1st MCP, 2nd PIP, 2nd MCP, 3rd PIP, 3rd MCP, 4th PIP, 4th MCP, 5th PIP and 5th MCP    She has swelling of the 1st PIP, 1st MCP, 2nd PIP, 2nd MCP, 3rd PIP, 3rd MCP, 4th PIP, 4th MCP, 5th PIP and 5th MCP    Shoulder Exam   Tenderness Location: no tenderness    Range of Motion   Active abduction:  abnormal   Adduction: abnormal  Sensation: normal    Knee Exam   Patellofemoral Crepitus: positive  Effusion: positive  Sensation: normal    Hip Exam   Tenderness Location: posterior  Sensation: normal    Elbow/Wrist Exam   Tenderness Location: no tenderness  Sensation: normal    Left Side Rheumatological Exam     The patient is tender to palpation of the shoulder, elbow, wrist, knee, 1st PIP, 1st MCP, 2nd PIP, 2nd MCP, 3rd PIP, 3rd MCP, 4th PIP, 4th MCP, 5th PIP and 5th MCP.    She has swelling of the 1st PIP, 1st MCP, 2nd PIP, 2nd MCP, 3rd PIP, 3rd MCP, 4th PIP, 4th MCP, 5th PIP and 5th MCP    Shoulder Exam   Tenderness Location: no tenderness    Range of Motion   Active abduction:  abnormal   Sensation: normal    Knee Exam     Patellofemoral Crepitus: positive  Effusion: positive  Sensation: normal    Hip Exam   Tenderness Location: posterior  Sensation: normal    Elbow/Wrist Exam  "  Sensation: normal      Back/Neck Exam   General Inspection   Gait: normal          Disease Assessment Scores:  Patient's Global Assessment of arthritis (0-10): 0  Physician's Global Assessment of arthritis (0-10): 0  Number of Tender Joints (0-28): 2  Number of Swollen Joints (0-28): 2    There is currently no information documented on the homunculus. Go to the Rheumatology activity and complete the homunculus joint exam.          No data to display                Monitoring Lab Results:  Lab Results   Component Value Date    WBC 8.40 03/22/2024    RBC 4.46 03/22/2024    HGB 13.7 03/22/2024    HCT 43.6 03/22/2024    MCV 98 03/22/2024    MCH 30.7 03/22/2024    MCHC 31.4 (L) 03/22/2024    RDW 13.0 03/22/2024     03/22/2024        Lab Results   Component Value Date     03/22/2024    K 4.6 03/22/2024     03/22/2024    CO2 25 03/22/2024    GLU 71 03/22/2024    BUN 13 03/22/2024    CREATININE 0.8 03/22/2024    CALCIUM 9.6 03/22/2024    PROT 6.7 03/22/2024    ALBUMIN 3.8 03/22/2024    BILITOT 0.6 03/22/2024    ALKPHOS 56 03/22/2024    AST 18 03/22/2024    ALT 17 03/22/2024    ANIONGAP 7 (L) 03/22/2024    EGFRNORACEVR >60.0 03/22/2024       Lab Results   Component Value Date    SEDRATE 17 03/22/2024    CRP 0.4 03/22/2024        Lab Results   Component Value Date    HESHBIXY92HU 20 (L) 03/22/2024    SVRRDJGI80 426 08/06/2022        Lab Results   Component Value Date    CHOL 217 (H) 08/07/2023    HDL 62 08/07/2023    LDLCALC 147.0 08/07/2023    TRIG 40 08/07/2023       No results found for: "RF", "CCPANTIBODIE"  Lab Results   Component Value Date    ANASCREEN Negative <1:80 06/07/2021    DSDNA Negative 1:10 06/07/2021     Lab Results   Component Value Date    HLABB27 Negative 09/18/2017       Infectious Disease Screening:  Lab Results   Component Value Date    HEPBSAG Negative 10/19/2018    HEPBIGM Negative 10/19/2018     Lab Results   Component Value Date    HEPCAB Negative 10/19/2018     No results " "found for: "TBGOLDPLUS", "QUANTTBGDPL"  No results found for: "QUANTIFERON", "SVCMT", "QUANTAGVALUE", "QUANTNILVALU", "QUANTMITOGEN", "QFTTBAG", "QINT"     Imaging:  DEXA, Xrays, MRIs, CTs, etc    Old & Outside Medical Records:  Reviewed old and all outside medical records available in Care Everywhere    Current Medication Changes:  Medication List with Changes/Refills   New Medications    CLOMIPHENE (CLOMID) 50 MG TABLET    Take 1 tablet (50 mg total) by mouth once daily. for 10 days   Current Medications    FLUTICASONE PROPIONATE (FLONASE ALLERGY RELIEF) 50 MCG/ACTUATION NASAL SPRAY    1 spray (50 mcg total) by Each Nostril route 2 (two) times daily.    METRONIDAZOLE (METROGEL) 0.75 % GEL    Apply topically once daily.    PREDNISONE (DELTASONE) 10 MG TABLET    Take 40mg for 1 days, take 30mg for 1 days, take 20mg for 1 days, take 10mg for 2 days    RETIN-A 0.025 % CREAM        TIROSINT 13 MCG CAP    TAKE 1 CAPSULE BY MOUTH EVERY DAY WITH THE 50 MCG    TIROSINT 50 MCG CAP    TAKE 1 CAPSULE BY MOUTH EVERY DAY        Assessment:     Encounter Diagnoses   Name Primary?    Infertility associated with anovulation Yes    Rosacea     Hashimoto's thyroiditis     Uveitis       Plan:    Juan was seen today for disease management.    Diagnoses and all orders for this visit:    Infertility associated with anovulation  -     clomiPHENE (CLOMID) 50 mg tablet; Take 1 tablet (50 mg total) by mouth once daily. for 10 days  -     Anti-Thyroglobulin Antibody; Future  -     T4, Free; Future  -     Thyroid Peroxidase Antibody; Future  -     TSH; Future  -     T3, Free; Future    Rosacea  -     Anti-Thyroglobulin Antibody; Future  -     T4, Free; Future  -     Thyroid Peroxidase Antibody; Future  -     TSH; Future  -     T3, Free; Future  -     Vitamin D; Future  -     Sedimentation rate; Future  -     C-Reactive Protein; Future  -     Comprehensive Metabolic Panel; Future  -     CBC Auto Differential; Future    Hashimoto's " thyroiditis  -     Anti-Thyroglobulin Antibody; Future  -     T4, Free; Future  -     Thyroid Peroxidase Antibody; Future  -     TSH; Future  -     T3, Free; Future  -     Vitamin D; Future  -     Sedimentation rate; Future  -     C-Reactive Protein; Future  -     Comprehensive Metabolic Panel; Future  -     CBC Auto Differential; Future    Uveitis  -     Anti-Thyroglobulin Antibody; Future  -     T4, Free; Future  -     Thyroid Peroxidase Antibody; Future  -     TSH; Future  -     T3, Free; Future  -     Vitamin D; Future  -     Sedimentation rate; Future  -     C-Reactive Protein; Future  -     Comprehensive Metabolic Panel; Future  -     CBC Auto Differential; Future     Pt has tried to get pregnant x 6 months without result.    More than 50% of the  51 minute encounter was spent face to face counseling the patient regarding current status and future plan of care as well as side effects  of the medications. All questions were answered to patient's satisfaction also includes  non-face to face time preparing to see the patient (eg, review of tests), Obtaining and/or reviewing separately obtained history, Documenting clinical information in the electronic or other health record, Independently interpreting results

## 2024-03-25 LAB
THYROGLOB AB SERPL IA-ACNC: 11.5 IU/ML (ref 0–3.9)
THYROPEROXIDASE IGG SERPL-ACNC: 101.8 IU/ML

## 2024-04-04 ENCOUNTER — TELEPHONE (OUTPATIENT)
Dept: OBSTETRICS AND GYNECOLOGY | Facility: CLINIC | Age: 44
End: 2024-04-04
Payer: MEDICAID

## 2024-04-04 ENCOUNTER — NURSE TRIAGE (OUTPATIENT)
Dept: ADMINISTRATIVE | Facility: CLINIC | Age: 44
End: 2024-04-04
Payer: MEDICAID

## 2024-04-04 NOTE — TELEPHONE ENCOUNTER
Pt was unable to get to appt today and it was cancelled. Pt reports abnormal periods. Pt reports that period came early. She started on the 29th instead of today. Pt is currently on Day 7 and is continuing to have heavy bleeding which is abnormal for her. Unknown if she was pregnant. Patient has been changing tampon 1 per hour since period started on 3/29.     Dispo is to go to office now. Unable to schedule appts for specialties. Will send high priority message. Recommended ED/UC if patient does not here from office within 2 hours. Pt v/u. Alexsander Unger NP  Reason for Disposition   MODERATE vaginal bleeding (i.e., soaking pad or tampon per hour and present > 6 hours; 1 menstrual cup every 6 hours)    Additional Information   Negative: SEVERE vaginal bleeding (e.g., continuous red blood from vagina, or large blood clots) and very weak (can't stand)   Negative: Passed out (i.e., fainted, collapsed and was not responding)   Negative: Difficult to awaken or acting confused (e.g., disoriented, slurred speech)   Negative: Shock suspected (e.g., cold/pale/clammy skin, too weak to stand, low BP, rapid pulse)   Negative: Sounds like a life-threatening emergency to the triager   Negative: SEVERE abdominal pain (e.g., excruciating)   Negative: SEVERE dizziness (e.g., unable to stand, requires support to walk, feels like passing out now)   Negative: SEVERE vaginal bleeding (e.g., soaking 2 pads or tampons per hour and present 2 or more hours; 1 menstrual cup every 2 hours)   Negative: Patient sounds very sick or weak to the triager    Protocols used: Vaginal Bleeding - [id number]-UI

## 2024-04-04 NOTE — TELEPHONE ENCOUNTER
Called pt to check on her and see how she is feling, pt stated that her cycle should have started today but instead started on 3/29 and has been bleeding heavy to the point that she is having to change a pad/tampon every hour. Pt states she is feeling fine, denies headaches, SOB, and dizziness. Advised pt that she should report to the ER in order to be evaluated.   Pt verbalized understanding.

## 2024-04-05 ENCOUNTER — TELEPHONE (OUTPATIENT)
Dept: OBSTETRICS AND GYNECOLOGY | Facility: CLINIC | Age: 44
End: 2024-04-05
Payer: MEDICAID

## 2024-04-05 NOTE — TELEPHONE ENCOUNTER
----- Message from Samuel Mika sent at 4/5/2024 11:09 AM CDT -----  Regarding: Self [phone]  Type: Patient Call Back    Who called: Self     What is the request in detail: called in regards to getting an ER follow up appt next week. Nothing available in epic. Would like a call back.     Can the clinic reply by [facility]? No     Would the patient rather a call back or a response via [facility]? Call back     Best call back number: [phone]     Additional Information:    Thank you.

## 2024-04-10 RX ORDER — LEVOTHYROXINE SODIUM 50 UG/1
CAPSULE ORAL
Qty: 30 CAPSULE | Refills: 3 | Status: SHIPPED | OUTPATIENT
Start: 2024-04-10

## 2024-04-12 ENCOUNTER — TELEPHONE (OUTPATIENT)
Dept: OBSTETRICS AND GYNECOLOGY | Facility: CLINIC | Age: 44
End: 2024-04-12
Payer: MEDICAID

## 2024-04-12 NOTE — TELEPHONE ENCOUNTER
----- Message from Ericknai Peterson sent at 4/11/2024 10:16 AM CDT -----  Name of Who is Calling:MICAELA FONG [[id number]]                What is the request in detail: Pt calling because her visit with NP was candled for today. Pt is asking why and she also would a call back to discuss because she is worried about her ER visit and would like to be seen, I went over apt I have as well with the pt, states she would like to be seen today and not in June.Please call back to further assist.                 Can the clinic reply by [facility]: No                What Number to Call Back if not in [facility]: [phone]

## 2024-04-26 ENCOUNTER — TELEPHONE (OUTPATIENT)
Dept: OBSTETRICS AND GYNECOLOGY | Facility: CLINIC | Age: 44
End: 2024-04-26
Payer: MEDICAID

## 2024-04-26 NOTE — TELEPHONE ENCOUNTER
----- Message from Katie Chowdary MA sent at 4/25/2024  4:58 PM CDT -----  Lakshmi Galloway DO Williams, Precious, MA  Caller: Unspecified (1 week ago)  Patient will need an apopointment either virtual or in person to check hormone levels and further discuss.        Previous Messages       ----- Message -----  From: Katie Chowdary MA  Sent: 4/12/2024   3:12 PM CDT  To: Lakshmi Galloway DO    Pt would like blood work orders placed to check hormone levels.

## 2024-04-26 NOTE — TELEPHONE ENCOUNTER
Called pt in regards to lab work . Informed pt that lab work will be ordered during visit. Pt verbalized understanding.

## 2024-05-13 ENCOUNTER — OFFICE VISIT (OUTPATIENT)
Dept: OBSTETRICS AND GYNECOLOGY | Facility: CLINIC | Age: 44
End: 2024-05-13
Payer: MEDICAID

## 2024-05-13 VITALS
WEIGHT: 117.94 LBS | BODY MASS INDEX: 21.7 KG/M2 | HEIGHT: 62 IN | DIASTOLIC BLOOD PRESSURE: 65 MMHG | SYSTOLIC BLOOD PRESSURE: 104 MMHG

## 2024-05-13 DIAGNOSIS — B37.31 VAGINAL CANDIDA: ICD-10-CM

## 2024-05-13 DIAGNOSIS — B96.89 BV (BACTERIAL VAGINOSIS): ICD-10-CM

## 2024-05-13 DIAGNOSIS — N76.0 BV (BACTERIAL VAGINOSIS): ICD-10-CM

## 2024-05-13 DIAGNOSIS — R30.0 DYSURIA: Primary | ICD-10-CM

## 2024-05-13 PROCEDURE — 1159F MED LIST DOCD IN RCRD: CPT | Mod: CPTII,,, | Performed by: OBSTETRICS & GYNECOLOGY

## 2024-05-13 PROCEDURE — 87186 SC STD MICRODIL/AGAR DIL: CPT | Performed by: OBSTETRICS & GYNECOLOGY

## 2024-05-13 PROCEDURE — 99213 OFFICE O/P EST LOW 20 MIN: CPT | Mod: PBBFAC,PN | Performed by: OBSTETRICS & GYNECOLOGY

## 2024-05-13 PROCEDURE — 99999 PR PBB SHADOW E&M-EST. PATIENT-LVL III: CPT | Mod: PBBFAC,,, | Performed by: OBSTETRICS & GYNECOLOGY

## 2024-05-13 PROCEDURE — 3074F SYST BP LT 130 MM HG: CPT | Mod: CPTII,,, | Performed by: OBSTETRICS & GYNECOLOGY

## 2024-05-13 PROCEDURE — 99999PBSHW PR PBB SHADOW TECHNICAL ONLY FILED TO HB: Mod: PBBFAC,,,

## 2024-05-13 PROCEDURE — 87088 URINE BACTERIA CULTURE: CPT | Performed by: OBSTETRICS & GYNECOLOGY

## 2024-05-13 PROCEDURE — 99214 OFFICE O/P EST MOD 30 MIN: CPT | Mod: S$PBB,,, | Performed by: OBSTETRICS & GYNECOLOGY

## 2024-05-13 PROCEDURE — 87210 SMEAR WET MOUNT SALINE/INK: CPT | Mod: PBBFAC,PN | Performed by: OBSTETRICS & GYNECOLOGY

## 2024-05-13 PROCEDURE — 3008F BODY MASS INDEX DOCD: CPT | Mod: CPTII,,, | Performed by: OBSTETRICS & GYNECOLOGY

## 2024-05-13 PROCEDURE — 87086 URINE CULTURE/COLONY COUNT: CPT | Performed by: OBSTETRICS & GYNECOLOGY

## 2024-05-13 PROCEDURE — 87077 CULTURE AEROBIC IDENTIFY: CPT | Performed by: OBSTETRICS & GYNECOLOGY

## 2024-05-13 PROCEDURE — 3078F DIAST BP <80 MM HG: CPT | Mod: CPTII,,, | Performed by: OBSTETRICS & GYNECOLOGY

## 2024-05-13 RX ORDER — FLUCONAZOLE 150 MG/1
150 TABLET ORAL DAILY
Qty: 2 TABLET | Refills: 0 | Status: SHIPPED | OUTPATIENT
Start: 2024-05-13 | End: 2024-06-10

## 2024-05-13 RX ORDER — NITROFURANTOIN 25; 75 MG/1; MG/1
100 CAPSULE ORAL 2 TIMES DAILY
Qty: 14 CAPSULE | Refills: 0 | Status: CANCELLED | OUTPATIENT
Start: 2024-05-13 | End: 2024-05-20

## 2024-05-13 RX ORDER — METRONIDAZOLE 500 MG/1
500 TABLET ORAL EVERY 12 HOURS
Qty: 14 TABLET | Refills: 0 | Status: SHIPPED | OUTPATIENT
Start: 2024-05-13 | End: 2024-06-10

## 2024-05-13 NOTE — PROGRESS NOTES
"Past medical, surgical, social, family, and obstetric histories; medications; prior records and results; and available outside records were reviewed and updated in the EMR.  Pertinent findings were noted below.    Reason for Visit   Urinary Urgency and Urinary Tract Infection    HPI   43 y.o. female     Patient's last menstrual period was 2024.    Coming in for symptoms that she believes are bladder infection. Denies back pain. Denies F/C. Urine has smell.   Denies vaginal discharge/odor but is having vaginal irritation.    Contraception: None - trying to conceive with new partner  Pap:   NILM/HPV(-)  Mammogram:  BIRADS1, T-C=13%    Exam   /65   Ht 5' 2" (1.575 m)   Wt 53.5 kg (117 lb 15.1 oz)   LMP 2024   BMI 21.57 kg/m²     Physical Exam  Constitutional:       General: She is not in acute distress.     Appearance: Normal appearance. She is normal weight. She is not ill-appearing.     Genitourinary:    Vulva and uterus normal.   There is vaginal discharge (gray, homogenous) in the vagina. Cervix is normal. Uerus contour normal  HENT:      Head: Normocephalic and atraumatic.   Pulmonary:      Effort: Pulmonary effort is normal.   Musculoskeletal:         General: Normal range of motion.   Neurological:      General: No focal deficit present.      Mental Status: She is alert and oriented to person, place, and time.   Psychiatric:         Mood and Affect: Mood normal.         Behavior: Behavior normal.         Thought Content: Thought content normal.         Judgment: Judgment normal.   Vitals reviewed.     pH 6, ++CLUE ++yeast, neg trich  Assessment and Plan   Dysuria  -     Urine culture    BV (bacterial vaginosis)  -     metroNIDAZOLE (FLAGYL) 500 MG tablet; Take 1 tablet (500 mg total) by mouth every 12 (twelve) hours. for 7 days  Dispense: 14 tablet; Refill: 0    Vaginal candida  -     fluconazole (DIFLUCAN) 150 MG Tab; Take 1 tablet (150 mg total) by mouth once daily. Take 1 " tablet by mouth before starting metronidazole then 1 tablet after finishing.  Dispense: 2 tablet; Refill: 0      Udip 2+ leuk, +protein. Ucx sent.  Wet prep c/w BV & yeast. Rx sent and instructions provided on how to take.  Discussed good bacteria and normal vaginal pH that prevents BV and yeast infections. Discussed factors that can disrupt normal vaginal pH which can lead to recurrent BV/yeast.

## 2024-05-15 ENCOUNTER — PATIENT MESSAGE (OUTPATIENT)
Dept: OBSTETRICS AND GYNECOLOGY | Facility: CLINIC | Age: 44
End: 2024-05-15
Payer: MEDICAID

## 2024-05-15 ENCOUNTER — TELEPHONE (OUTPATIENT)
Dept: OBSTETRICS AND GYNECOLOGY | Facility: CLINIC | Age: 44
End: 2024-05-15
Payer: MEDICAID

## 2024-05-15 DIAGNOSIS — B96.20 E-COLI UTI: Primary | ICD-10-CM

## 2024-05-15 DIAGNOSIS — N39.0 E-COLI UTI: Primary | ICD-10-CM

## 2024-05-15 RX ORDER — NITROFURANTOIN 25; 75 MG/1; MG/1
100 CAPSULE ORAL 2 TIMES DAILY
Qty: 14 CAPSULE | Refills: 0 | Status: SHIPPED | OUTPATIENT
Start: 2024-05-15 | End: 2024-05-22

## 2024-05-15 NOTE — TELEPHONE ENCOUNTER
Called to notify patient of +Ecoli UTI and common etiologies. Susceptibilities are pending but last Ecoli UTI was susceptible to Macrobid. Rx sent. Questions answered.    Diagnoses and all orders for this visit:    E-coli UTI  -     nitrofurantoin, macrocrystal-monohydrate, (MACROBID) 100 MG capsule; Take 1 capsule (100 mg total) by mouth 2 (two) times daily. for 7 days

## 2024-05-16 LAB — BACTERIA UR CULT: ABNORMAL

## 2024-06-09 DIAGNOSIS — E06.3 HYPOTHYROIDISM DUE TO HASHIMOTO'S THYROIDITIS: ICD-10-CM

## 2024-06-09 DIAGNOSIS — E03.8 HYPOTHYROIDISM DUE TO HASHIMOTO'S THYROIDITIS: ICD-10-CM

## 2024-06-10 ENCOUNTER — OFFICE VISIT (OUTPATIENT)
Dept: OBSTETRICS AND GYNECOLOGY | Facility: CLINIC | Age: 44
End: 2024-06-10
Attending: OBSTETRICS & GYNECOLOGY

## 2024-06-10 VITALS
WEIGHT: 118.81 LBS | DIASTOLIC BLOOD PRESSURE: 64 MMHG | BODY MASS INDEX: 21.86 KG/M2 | SYSTOLIC BLOOD PRESSURE: 104 MMHG | HEIGHT: 62 IN

## 2024-06-10 DIAGNOSIS — Z31.41 FERTILITY TESTING: ICD-10-CM

## 2024-06-10 DIAGNOSIS — E06.3 HYPOTHYROIDISM DUE TO HASHIMOTO'S THYROIDITIS: ICD-10-CM

## 2024-06-10 DIAGNOSIS — N93.9 ABNORMAL UTERINE BLEEDING (AUB): Primary | ICD-10-CM

## 2024-06-10 DIAGNOSIS — E03.8 HYPOTHYROIDISM DUE TO HASHIMOTO'S THYROIDITIS: ICD-10-CM

## 2024-06-10 PROCEDURE — 99213 OFFICE O/P EST LOW 20 MIN: CPT | Mod: PBBFAC,PO | Performed by: OBSTETRICS & GYNECOLOGY

## 2024-06-10 PROCEDURE — 99213 OFFICE O/P EST LOW 20 MIN: CPT | Mod: S$PBB,,, | Performed by: OBSTETRICS & GYNECOLOGY

## 2024-06-10 PROCEDURE — 99999 PR PBB SHADOW E&M-EST. PATIENT-LVL III: CPT | Mod: PBBFAC,,, | Performed by: OBSTETRICS & GYNECOLOGY

## 2024-06-10 RX ORDER — LEVOTHYROXINE SODIUM 13 UG/1
CAPSULE ORAL
Qty: 30 CAPSULE | Refills: 3 | Status: SHIPPED | OUTPATIENT
Start: 2024-06-10

## 2024-06-10 RX ORDER — TRETINOIN 0.5 MG/G
CREAM TOPICAL
COMMUNITY
Start: 2024-05-28

## 2024-06-10 NOTE — PROGRESS NOTES
CC:F/U ED    HPI:was seen in the ED 4/4/24, periods started early, heavier, lasted longer.  No pain, no cramping.  Ultrasound was performed showed normal lining, small ovarian cyst. GC/CT negative.    UTI in may, was treated and has been better.  Periods have since become normal.  Still considering pregnancy, partner now lives here.  Thyroid function is normal, antibodies are increased.  Taking daily.     ROS:  GENERAL: Feeling well overall. Denies fever or chills.   SKIN: Denies rash or lesions.   HEAD: Denies head injury or headache.   NODES: Denies enlarged lymph nodes.   CHEST: Denies chest pain or shortness of breath.   CARDIOVASCULAR: Denies palpitations or left sided chest pain.   ABDOMEN: No abdominal pain, constipation, diarrhea, nausea, vomiting or rectal bleeding.   URINARY: No dysuria, hematuria, or burning on urination.  REPRODUCTIVE: See HPI.   PSYCHIATRIC: Denies depression, anxiety or mood swings.    PE:   APPEARANCE: Well nourished, well developed, White female in no acute distress.    EXAMINATION:  US PELVIS COMP WITH TRANSVAG NON-OB (XPD)     CLINICAL HISTORY:  heavy vaginal bleeding x 7 days. early for typical menstrual cycle;     TECHNIQUE:  Exam is performed both transabdominally and endovaginally     COMPARISON:  None     FINDINGS:  Uterus measures 7.6 x 4 x 4.9 cm.  The endometrial stripe is 5 mm.     The right ovary measures 3.1 x 1.2 x 1.7 cm.  There is flow present.     Left ovary measures 3.5 x 1.4 x 2.4 cm.  There is a complex cyst present measuring 1.4 x 0.9 x 1 cm.  There is flow present.     Impression:     Small complex cyst of the left ovary        Electronically signed by:Nash Shipley MD    Diagnosis:  1. Abnormal uterine bleeding (AUB)    2. Hypothyroidism due to Hashimoto's thyroiditis    3. Fertility testing        Plan:     Orders Placed This Encounter    Antimullerian Hormone ([facility])    Progesterone    TSH    T4, FREE           Lakshmi Galloway DO

## 2024-06-13 ENCOUNTER — LAB VISIT (OUTPATIENT)
Dept: LAB | Facility: OTHER | Age: 44
End: 2024-06-13
Attending: OBSTETRICS & GYNECOLOGY

## 2024-06-13 DIAGNOSIS — Z31.41 FERTILITY TESTING: ICD-10-CM

## 2024-06-13 DIAGNOSIS — E03.8 HYPOTHYROIDISM DUE TO HASHIMOTO'S THYROIDITIS: ICD-10-CM

## 2024-06-13 DIAGNOSIS — E06.3 HYPOTHYROIDISM DUE TO HASHIMOTO'S THYROIDITIS: ICD-10-CM

## 2024-06-13 LAB
PROGEST SERPL-MCNC: 7.4 NG/ML
T4 FREE SERPL-MCNC: 1.01 NG/DL (ref 0.71–1.51)
TSH SERPL DL<=0.005 MIU/L-ACNC: 5.75 UIU/ML (ref 0.4–4)

## 2024-06-13 PROCEDURE — 82166 ASSAY ANTI-MULLERIAN HORM: CPT | Performed by: OBSTETRICS & GYNECOLOGY

## 2024-06-13 PROCEDURE — 84439 ASSAY OF FREE THYROXINE: CPT | Performed by: OBSTETRICS & GYNECOLOGY

## 2024-06-13 PROCEDURE — 84144 ASSAY OF PROGESTERONE: CPT | Performed by: OBSTETRICS & GYNECOLOGY

## 2024-06-13 PROCEDURE — 36415 COLL VENOUS BLD VENIPUNCTURE: CPT | Performed by: OBSTETRICS & GYNECOLOGY

## 2024-06-13 PROCEDURE — 84443 ASSAY THYROID STIM HORMONE: CPT | Performed by: OBSTETRICS & GYNECOLOGY

## 2024-06-15 LAB — MIS SERPL-MCNC: 1.8 NG/ML (ref 0.03–5.5)

## 2024-06-23 ENCOUNTER — PATIENT MESSAGE (OUTPATIENT)
Dept: OBSTETRICS AND GYNECOLOGY | Facility: CLINIC | Age: 44
End: 2024-06-23
Payer: OTHER MISCELLANEOUS

## 2024-06-24 ENCOUNTER — TELEPHONE (OUTPATIENT)
Dept: OBSTETRICS AND GYNECOLOGY | Facility: CLINIC | Age: 44
End: 2024-06-24
Payer: OTHER MISCELLANEOUS

## 2024-06-24 DIAGNOSIS — Z31.41 FERTILITY TESTING: Primary | ICD-10-CM

## 2024-06-25 ENCOUNTER — LAB VISIT (OUTPATIENT)
Dept: LAB | Facility: HOSPITAL | Age: 44
End: 2024-06-25
Attending: OBSTETRICS & GYNECOLOGY

## 2024-06-25 DIAGNOSIS — Z31.41 FERTILITY TESTING: ICD-10-CM

## 2024-06-25 LAB
ESTRADIOL SERPL-MCNC: 39 PG/ML
FSH SERPL-ACNC: 8.01 MIU/ML
LH SERPL-ACNC: 5.9 MIU/ML

## 2024-06-25 PROCEDURE — 83001 ASSAY OF GONADOTROPIN (FSH): CPT | Performed by: OBSTETRICS & GYNECOLOGY

## 2024-06-25 PROCEDURE — 82166 ASSAY ANTI-MULLERIAN HORM: CPT | Performed by: OBSTETRICS & GYNECOLOGY

## 2024-06-25 PROCEDURE — 36415 COLL VENOUS BLD VENIPUNCTURE: CPT | Mod: PO | Performed by: OBSTETRICS & GYNECOLOGY

## 2024-06-25 PROCEDURE — 83002 ASSAY OF GONADOTROPIN (LH): CPT | Performed by: OBSTETRICS & GYNECOLOGY

## 2024-06-25 PROCEDURE — 82670 ASSAY OF TOTAL ESTRADIOL: CPT | Performed by: OBSTETRICS & GYNECOLOGY

## 2024-06-26 ENCOUNTER — TELEPHONE (OUTPATIENT)
Dept: ENDOCRINOLOGY | Facility: CLINIC | Age: 44
End: 2024-06-26
Payer: OTHER MISCELLANEOUS

## 2024-06-26 DIAGNOSIS — E06.3 HYPOTHYROIDISM DUE TO HASHIMOTO'S THYROIDITIS: Primary | ICD-10-CM

## 2024-06-26 DIAGNOSIS — E03.8 HYPOTHYROIDISM DUE TO HASHIMOTO'S THYROIDITIS: Primary | ICD-10-CM

## 2024-06-26 RX ORDER — LEVOTHYROXINE SODIUM 50 UG/1
CAPSULE ORAL
Qty: 30 CAPSULE | Refills: 3 | Status: SHIPPED | OUTPATIENT
Start: 2024-06-26

## 2024-06-26 RX ORDER — LEVOTHYROXINE SODIUM 75 UG/1
75 CAPSULE ORAL DAILY
Qty: 30 CAPSULE | Refills: 6 | Status: SHIPPED | OUTPATIENT
Start: 2024-06-26 | End: 2024-06-26

## 2024-06-26 RX ORDER — LEVOTHYROXINE SODIUM 13 UG/1
CAPSULE ORAL
Qty: 30 CAPSULE | Refills: 3 | Status: SHIPPED | OUTPATIENT
Start: 2024-06-26

## 2024-06-26 NOTE — TELEPHONE ENCOUNTER
----- Message from Lakshmi Galloway, DO sent at 6/26/2024  8:58 AM CDT -----  Just letting you know about this TSH level.

## 2024-06-26 NOTE — TELEPHONE ENCOUNTER
Pt states she missed 5 or 6 days of medication, went out of town and left meds at home. Stay on current dose and repeat labs?

## 2024-06-27 LAB — MIS SERPL-MCNC: 1.4 NG/ML (ref 0.03–5.5)

## 2024-07-30 ENCOUNTER — PATIENT MESSAGE (OUTPATIENT)
Dept: ENDOCRINOLOGY | Facility: CLINIC | Age: 44
End: 2024-07-30
Payer: OTHER MISCELLANEOUS

## 2024-07-30 DIAGNOSIS — E03.8 HYPOTHYROIDISM DUE TO HASHIMOTO'S THYROIDITIS: ICD-10-CM

## 2024-07-30 DIAGNOSIS — E06.3 HYPOTHYROIDISM DUE TO HASHIMOTO'S THYROIDITIS: ICD-10-CM

## 2024-07-30 RX ORDER — LEVOTHYROXINE SODIUM 13 UG/1
CAPSULE ORAL
Qty: 90 CAPSULE | Refills: 0 | Status: SHIPPED | OUTPATIENT
Start: 2024-07-30

## 2024-07-30 RX ORDER — LEVOTHYROXINE SODIUM 50 UG/1
CAPSULE ORAL
Qty: 90 CAPSULE | Refills: 0 | Status: SHIPPED | OUTPATIENT
Start: 2024-07-30

## 2024-08-08 ENCOUNTER — PATIENT MESSAGE (OUTPATIENT)
Dept: ENDOCRINOLOGY | Facility: CLINIC | Age: 44
End: 2024-08-08
Payer: OTHER MISCELLANEOUS

## 2024-08-28 DIAGNOSIS — Z12.31 OTHER SCREENING MAMMOGRAM: ICD-10-CM

## 2024-11-19 ENCOUNTER — PATIENT MESSAGE (OUTPATIENT)
Dept: ENDOCRINOLOGY | Facility: CLINIC | Age: 44
End: 2024-11-19
Payer: OTHER MISCELLANEOUS

## 2024-11-19 RX ORDER — LEVOTHYROXINE SODIUM 50 UG/1
CAPSULE ORAL
Qty: 90 CAPSULE | Refills: 0 | Status: SHIPPED | OUTPATIENT
Start: 2024-11-19 | End: 2024-11-21 | Stop reason: SDUPTHER

## 2024-11-21 RX ORDER — LEVOTHYROXINE SODIUM 50 UG/1
CAPSULE ORAL
Qty: 90 CAPSULE | Refills: 0 | Status: SHIPPED | OUTPATIENT
Start: 2024-11-21

## 2025-04-08 RX ORDER — LEVOTHYROXINE SODIUM 50 UG/1
CAPSULE ORAL
Qty: 30 CAPSULE | OUTPATIENT
Start: 2025-04-08

## 2025-04-17 ENCOUNTER — OFFICE VISIT (OUTPATIENT)
Dept: ENDOCRINOLOGY | Facility: CLINIC | Age: 45
End: 2025-04-17
Payer: COMMERCIAL

## 2025-04-17 DIAGNOSIS — E06.3 HYPOTHYROIDISM DUE TO HASHIMOTO'S THYROIDITIS: Primary | ICD-10-CM

## 2025-04-17 RX ORDER — LEVOTHYROXINE SODIUM 50 UG/1
CAPSULE ORAL
Qty: 90 CAPSULE | Refills: 3 | Status: SHIPPED | OUTPATIENT
Start: 2025-04-17

## 2025-04-17 RX ORDER — LEVOTHYROXINE SODIUM 13 UG/1
CAPSULE ORAL
Qty: 90 CAPSULE | Refills: 3 | Status: SHIPPED | OUTPATIENT
Start: 2025-04-17 | End: 2025-04-23

## 2025-04-17 NOTE — PROGRESS NOTES
The patient location is: [place]    Visit type: audiovisual    Face to Face time with patient: 17  17 minutes of total time spent on the encounter, which includes face to face time and non-face to face time preparing to see the patient (eg, review of tests), Obtaining and/or reviewing separately obtained history, Documenting clinical information in the electronic or other health record, Independently interpreting results (not separately reported) and communicating results to the patient/family/caregiver, or Care coordination (not separately reported).         Each patient to whom he or she provides medical services by telemedicine is:  (1) informed of the relationship between the physician and patient and the respective role of any other health care provider with respect to management of the patient; and (2) notified that he or she may decline to receive medical services by telemedicine and may withdraw from such care at any time.    Notes:     CHIEF COMPLAINT: Hypothyroidism  44 y.o.  being seen as a f/u. Hypothyroidism diagnosed 2012. Was on armour 60 mg daily. on Tirosint due to fluctuating levels in the past. Now on Tirosint 50 mcg  + 13 mcg daily. Sometimes missed the dose of 13 mcg. For past 3 weeks only taking 50 mcg daily. No palpitations. No tremors. No diarrhea or constipation.         PAST MEDICAL HISTORY/PAST SURGICAL HISTORY:  Reviewed in [facility]     SOCIAL HISTORY: Quit smoking 1995. No alcohol.      FAMILY HISTORY:  + Breast Ca. No known with thyroid disease.      MEDICATIONS/ALLERGIES: The patient's MedCard has been updated and reviewed.          PE:                 ASSESSMENT/PLAN:  1. Hypothyroidism- TPO +-  appears that she is only taking the 50 mcg of Tirosint.  She has been taking the 50 mcg for approximately 3 weeks.  Discussed continuing 50 mcg daily for now and we will check levels in 1 week.  Discussed that she needs to be on a consistent dose for us to be able to check levels.    2. Heterogeneous  thyroid ultrasound-patient had questions about previous ultrasound that was done.  Previous ultrasound showed a heterogeneous gland which is expected to be seen with underlying autoimmune thyroid disease.  Does not need to be monitored.  Will have thyroid checked with physical exam annually.  If anything palpable on exam, can do ultrasound         FOLLOWUP  1 week TSH, FT4, T3

## 2025-04-25 ENCOUNTER — LAB VISIT (OUTPATIENT)
Dept: LAB | Facility: HOSPITAL | Age: 45
End: 2025-04-25
Attending: INTERNAL MEDICINE
Payer: COMMERCIAL

## 2025-04-25 DIAGNOSIS — E06.3 HYPOTHYROIDISM DUE TO HASHIMOTO'S THYROIDITIS: ICD-10-CM

## 2025-04-25 LAB
T3FREE SERPL-MCNC: 64 NG/DL (ref 60–180)
T4 FREE SERPL-MCNC: 0.9 NG/DL (ref 0.71–1.51)
TSH SERPL-ACNC: 23.3 UIU/ML (ref 0.4–4)

## 2025-04-25 PROCEDURE — 36415 COLL VENOUS BLD VENIPUNCTURE: CPT | Mod: PO

## 2025-04-25 PROCEDURE — 84443 ASSAY THYROID STIM HORMONE: CPT

## 2025-04-25 PROCEDURE — 84439 ASSAY OF FREE THYROXINE: CPT

## 2025-04-25 PROCEDURE — 84480 ASSAY TRIIODOTHYRONINE (T3): CPT

## 2025-04-28 ENCOUNTER — RESULTS FOLLOW-UP (OUTPATIENT)
Dept: ENDOCRINOLOGY | Facility: CLINIC | Age: 45
End: 2025-04-28

## 2025-04-28 ENCOUNTER — TELEPHONE (OUTPATIENT)
Dept: ENDOCRINOLOGY | Facility: CLINIC | Age: 45
End: 2025-04-28
Payer: COMMERCIAL

## 2025-04-28 DIAGNOSIS — E06.3 HYPOTHYROIDISM DUE TO HASHIMOTO'S THYROIDITIS: Primary | ICD-10-CM

## 2025-04-28 RX ORDER — LEVOTHYROXINE SODIUM 13 UG/1
CAPSULE ORAL
Qty: 30 CAPSULE | Refills: 6 | Status: SHIPPED | OUTPATIENT
Start: 2025-04-28

## 2025-04-28 NOTE — TELEPHONE ENCOUNTER
Let pt know the TSH is significantly elevated.   I would like to add back the tirosint 13 mcg to the 50 mcg she is taking.     Check TSH 6 weeks

## 2025-06-09 ENCOUNTER — LAB VISIT (OUTPATIENT)
Dept: LAB | Facility: HOSPITAL | Age: 45
End: 2025-06-09
Attending: INTERNAL MEDICINE
Payer: COMMERCIAL

## 2025-06-09 DIAGNOSIS — E06.3 HYPOTHYROIDISM DUE TO HASHIMOTO'S THYROIDITIS: ICD-10-CM

## 2025-06-09 LAB
T4 FREE SERPL-MCNC: 0.82 NG/DL (ref 0.71–1.51)
TSH SERPL-ACNC: 37.81 UIU/ML (ref 0.4–4)

## 2025-06-09 PROCEDURE — 84443 ASSAY THYROID STIM HORMONE: CPT

## 2025-06-09 PROCEDURE — 84439 ASSAY OF FREE THYROXINE: CPT

## 2025-06-09 PROCEDURE — 36415 COLL VENOUS BLD VENIPUNCTURE: CPT | Mod: PO

## 2025-06-10 ENCOUNTER — PATIENT MESSAGE (OUTPATIENT)
Dept: ENDOCRINOLOGY | Facility: CLINIC | Age: 45
End: 2025-06-10
Payer: COMMERCIAL

## 2025-06-10 DIAGNOSIS — E06.3 HYPOTHYROIDISM DUE TO HASHIMOTO'S THYROIDITIS: Primary | ICD-10-CM

## 2025-06-10 RX ORDER — LEVOTHYROXINE SODIUM 13 UG/1
CAPSULE ORAL
Qty: 30 CAPSULE | Refills: 6 | Status: SHIPPED | OUTPATIENT
Start: 2025-06-10

## 2025-06-11 ENCOUNTER — PATIENT MESSAGE (OUTPATIENT)
Dept: RHEUMATOLOGY | Facility: CLINIC | Age: 45
End: 2025-06-11
Payer: COMMERCIAL

## 2025-06-11 ENCOUNTER — PATIENT MESSAGE (OUTPATIENT)
Dept: OBSTETRICS AND GYNECOLOGY | Facility: CLINIC | Age: 45
End: 2025-06-11
Payer: COMMERCIAL

## 2025-06-16 ENCOUNTER — RESULTS FOLLOW-UP (OUTPATIENT)
Dept: ENDOCRINOLOGY | Facility: CLINIC | Age: 45
End: 2025-06-16

## 2025-08-19 ENCOUNTER — PATIENT MESSAGE (OUTPATIENT)
Dept: ADMINISTRATIVE | Facility: HOSPITAL | Age: 45
End: 2025-08-19
Payer: COMMERCIAL

## (undated) DEVICE — Device